# Patient Record
Sex: FEMALE | Race: WHITE | HISPANIC OR LATINO | Employment: OTHER | URBAN - METROPOLITAN AREA
[De-identification: names, ages, dates, MRNs, and addresses within clinical notes are randomized per-mention and may not be internally consistent; named-entity substitution may affect disease eponyms.]

---

## 2018-10-24 PROBLEM — R93.5 ABNORMAL CT OF THE ABDOMEN: Status: ACTIVE | Noted: 2018-10-24

## 2018-10-24 PROBLEM — K31.89 MASS OF STOMACH: Status: ACTIVE | Noted: 2018-10-24

## 2018-10-24 PROBLEM — R19.8 ABNORMAL FINDINGS ON ESOPHAGOGASTRODUODENOSCOPY (EGD): Status: ACTIVE | Noted: 2018-10-24

## 2018-10-24 PROBLEM — R93.3 ABNORMAL COMPUTED TOMOGRAPHY OF STOMACH: Status: ACTIVE | Noted: 2018-10-24

## 2018-10-24 NOTE — PROGRESS NOTES
[facility] Hematolgy/Oncology  History & Physical    Subjective:      Patient ID:   NAME: Lorraine Warner : 1955     63 y.o. female    Referring Doc: Judith  Other Physicians: ariel Velarde (PCP-[place])        Chief Complaint: gastric submucosal nodule      HPI:  63 y.o. female with diagnosis of a submucosal nodule of the gastric cardia who has been referred by Dr Yunior Wong with GI for evaluation by medical hematology/oncology. She had originally presented with abdominal pain that were intermittent over the past 3 months and underwent EGD with Dr Wong on 10/19/2018. She had a CT scan which showed a 2.7 x 1.6cm soft tissue mass in the cardia of the stomach. He found a submucosal nodule in the cardia of the stomach. Pathology from the biopsy seems to have been nondiagnostic. She speaks Zambian as her primary language and her  and my  Teresita assisted with translation. She is from [place]. She denies any weight loss, fevers or night sweats. No family history of cancers.               ROS:   GEN: normal without any fever, night sweats or weight loss  HEENT: normal with no HA's, sore throat, stiff neck, changes in vision  CV: normal with no CP, SOB, PND, MARSHALL or orthopnea  PULM: normal with no SOB, cough, hemoptysis, sputum or pleuritic pain  GI: normal with no abdominal pain, nausea, vomiting, constipation, diarrhea, melanotic stools, BRBPR, or hematemesis  : normal with no hematuria, dysuria  BREAST: normal with no mass, discharge, pain  SKIN: normal with no rash, erythema, bruising, or swelling       Past Medical/Surgical History:  Past Medical History:   Diagnosis Date    Abnormal computed tomography of stomach 10/24/2018    Abnormal CT of the abdomen (soft tissue mass cardia of stomach) 10/24/2018    Abnormal findings on esophagogastroduodenoscopy (EGD) 10/24/2018    Mass of stomach - cardia 10/24/2018     History reviewed. No pertinent surgical history.      Allergies:  Review of  "patient's allergies indicates:  Allergies not on file    Social/Family History:  Social History     Socioeconomic History    Marital status:      Spouse name: Not on file    Number of children: Not on file    Years of education: Not on file    Highest education level: Not on file   Social Needs    Financial resource strain: Not on file    Food insecurity - worry: Not on file    Food insecurity - inability: Not on file    Transportation needs - medical: Not on file    Transportation needs - non-medical: Not on file   Occupational History    Not on file   Tobacco Use    Smoking status: Never Smoker    Smokeless tobacco: Never Used   Substance and Sexual Activity    Alcohol use: No     Frequency: Never    Drug use: No    Sexual activity: Not on file   Other Topics Concern    Not on file   Social History Narrative    Not on file     History reviewed. No pertinent family history.      Medications:    Current Outpatient Medications:     atorvastatin (LIPITOR) 40 MG tablet, Take 40 mg by mouth once daily., Disp: , Rfl:     chlorthalidone (HYGROTEN) 25 MG Tab, Take 25 mg by mouth once daily., Disp: , Rfl:     omeprazole (PRILOSEC) 20 MG capsule, Take 20 mg by mouth once daily., Disp: , Rfl:     potassium chloride (MICRO-K) 10 MEQ CpSR, Take 10 mEq by mouth once., Disp: , Rfl:       Pathology:  Cancer Staging      Gastric cardia biopsy: 10/19/2018:  Gastric oxyntic mucosa with nonspecific mild superficial lamina propria edema and minimal inactive chronic inflammation  - h pylori negative by stains  - no submucosal stromal tissue was identified      Objective:   Vitals:  Blood pressure 133/76, pulse 90, temperature 98.6 °F (37 °C), resp. rate 20, height 5' 0.5" (1.537 m), weight 62.1 kg (137 lb).    Physical Examination:   GEN: no apparent distress, comfortable; AAOx3  HEAD: atraumatic and normocephalic  EYES: no pallor, no icterus, PERRLA  ENT: OMM, no pharyngeal erythema, external ears WNL; no " nasal discharge; no thrush  NECK: no masses, thyroid normal, trachea midline, no LAD/LN's, supple  CV: RRR with no murmur; normal pulse; normal S1 and S2; no pedal edema  CHEST: Normal respiratory effort; CTAB; normal breath sounds; no wheeze or crackles  ABDOM: nontender and nondistended; soft; normal bowel sounds; no rebound/guarding  MUSC/Skeletal: ROM normal; no crepitus; joints normal; no deformities or arthropathy  EXTREM: no clubbing, cyanosis, inflammation or swelling  SKIN: no rashes, lesions, ulcers, petechiae or subcutaneous nodules  : no amaro  NEURO: grossly intact; motor/sensory WNL; AAOx3; no tremors  PSYCH: normal mood, affect and behavior  LYMPH: normal cervical, supraclavicular, axillary and groin LN's      Labs:   10/11/2018 on chart    Radiology/Diagnostic Studies:    Ct abdom/pelvis 10/11/2018 on chart      All lab results and imaging results have been reviewed and discussed with the patient    Assessment:   (1) 63 y.o. female withdiagnosis of a submucosal nodule of the gastric cardia who has been referred by Dr Yunior Wong with GI for evaluation by medical hematology/oncology.   - She had originally presented with abdominal pain and underwent EGD with Dr Wong on 10/19/2018.   - She had a CT scan on 10/11/2018 which showed a 2.7 x 1.6cm soft tissue mass in the cardia of the stomach.   - on EGD, he found a submucosal nodule in the cardia of the stomach.   - Pathology from the biopsy seems to have been nondiagnostic.     (2) HTN and hypercholesterolemia    (3) Hernia    (4) Chronic gastritis    (5) Low potassium    (6) S/p prior bladder drainage issue            Plan:       Abnormal CT of the abdomen (soft tissue mass cardia of stomach)    Abnormal computed tomography of stomach    Mass of stomach - cardia    Abnormal findings on esophagogastroduodenoscopy (EGD)            PLAN:  1. Refer to Dr Garcia/Jay with Gen Surg for surgical evaluation for biopsy and/or resection  2. F/u with PCP  and GI  3. RTC in  3-4 weeks after Gen Surg evaluation  Fax note to Will Velarde MD, Radha Wong        I have explained and the patient understands all of  the current recommendation(s). I have answered all of their questions to the best of my ability and to their complete satisfaction.             Thank you for allowing me to participate in this patient's care. Please call with any questions or concerns.    Electronically signed William Joe MD

## 2018-10-25 ENCOUNTER — OFFICE VISIT (OUTPATIENT)
Dept: HEMATOLOGY/ONCOLOGY | Facility: CLINIC | Age: 63
End: 2018-10-25
Payer: MEDICARE

## 2018-10-25 VITALS
HEIGHT: 61 IN | HEART RATE: 90 BPM | TEMPERATURE: 99 F | SYSTOLIC BLOOD PRESSURE: 133 MMHG | BODY MASS INDEX: 25.86 KG/M2 | DIASTOLIC BLOOD PRESSURE: 76 MMHG | WEIGHT: 137 LBS | RESPIRATION RATE: 20 BRPM

## 2018-10-25 DIAGNOSIS — R93.3 ABNORMAL COMPUTED TOMOGRAPHY OF STOMACH: ICD-10-CM

## 2018-10-25 DIAGNOSIS — K31.89 MASS OF STOMACH: ICD-10-CM

## 2018-10-25 DIAGNOSIS — R93.5 ABNORMAL CT OF THE ABDOMEN: ICD-10-CM

## 2018-10-25 DIAGNOSIS — R19.8 ABNORMAL FINDINGS ON ESOPHAGOGASTRODUODENOSCOPY (EGD): ICD-10-CM

## 2018-10-25 PROCEDURE — 99204 OFFICE O/P NEW MOD 45 MIN: CPT | Mod: ,,, | Performed by: INTERNAL MEDICINE

## 2018-10-25 RX ORDER — POTASSIUM CHLORIDE 750 MG/1
10 CAPSULE, EXTENDED RELEASE ORAL EVERY MORNING
COMMUNITY
End: 2024-02-27

## 2018-10-25 RX ORDER — ATORVASTATIN CALCIUM 40 MG/1
40 TABLET, FILM COATED ORAL NIGHTLY
COMMUNITY

## 2018-10-25 RX ORDER — CHLORTHALIDONE 25 MG/1
12.5 TABLET ORAL EVERY MORNING
COMMUNITY
End: 2021-08-06 | Stop reason: ALTCHOICE

## 2018-10-25 RX ORDER — PHENOL/SODIUM PHENOLATE
20 AEROSOL, SPRAY (ML) MUCOUS MEMBRANE EVERY MORNING
COMMUNITY
End: 2021-08-06 | Stop reason: ALTCHOICE

## 2018-10-25 NOTE — LETTER
October 25, 2018      Will Velarde MD  [address] [facility] [id number] [place] [address] - Hematology Oncology  [address] [place] [address]  [address] [place] [address]  Phone: [phone]  Fax: [phone]          Patient: Lorraine Warner   MR Number: [id number]   YOB: 1955   Date of Visit: 10/25/2018       Dear Dr. Will Velarde:    Thank you for referring Lorraine Warner to me for evaluation. Attached you will find relevant portions of my assessment and plan of care.    If you have questions, please do not hesitate to call me. I look forward to following Lorraine Warner along with you.    Sincerely,    William Joe MD    Enclosure  CC:  No Recipients    If you would like to receive this communication electronically, please contact [email or web address] or [phone] to request more information on [employer] Link access.    For providers and/or their staff who would like to refer a patient to [facility], please contact us through our one-stop-shop provider referral line, [facility], at [phone].    If you feel you have received this communication in error or would no longer like to receive these types of communications, please e-mail [email or web address]

## 2018-10-31 ENCOUNTER — OFFICE VISIT (OUTPATIENT)
Dept: SURGERY | Facility: CLINIC | Age: 63
End: 2018-10-31
Payer: MEDICARE

## 2018-10-31 VITALS
DIASTOLIC BLOOD PRESSURE: 76 MMHG | WEIGHT: 137 LBS | SYSTOLIC BLOOD PRESSURE: 133 MMHG | HEIGHT: 60 IN | BODY MASS INDEX: 26.9 KG/M2

## 2018-10-31 DIAGNOSIS — C49.A2 GASTROINTESTINAL STROMAL TUMOR (GIST) OF STOMACH: Primary | ICD-10-CM

## 2018-10-31 PROCEDURE — 99203 OFFICE O/P NEW LOW 30 MIN: CPT | Mod: ,,, | Performed by: SURGERY

## 2018-10-31 NOTE — PROGRESS NOTES
Subjective:       Patient ID: Lorraine Warner is a 63 y.o. female.    Chief Complaint: Consult (Referred by Dr. Joe to eval submucosal tumor of stomach )      HPI:  Patient is 63 year old female referred to the office after she was found to have an 2.7cm mass extending from the cardia of the stomach posteriorly.  The lesion was found incidentally on CT during workup for vaginal bleeding.  EGD was performed that revealed a submucosal mass about 4-5cm distal to the GE junction.  Path showed only mucosa.  CT shows no evidence of metastatic disease.  She reports some mild left mid abdominal pain.  Pain is dull.  She has no fever or chills.  She has no nausea or vomiting. Communication during the visit was provided by phone translation Kinyarwanda to English, English to Kinyarwanda - [email or web address] with [employer]    Past Medical History:   Diagnosis Date    Abnormal computed tomography of stomach 10/24/2018    Abnormal CT of the abdomen (soft tissue mass cardia of stomach) 10/24/2018    GERD (gastroesophageal reflux disease)     Hyperlipidemia     Hypertension     Mass of stomach - cardia 10/24/2018     Past Surgical History:   Procedure Laterality Date    BLADDER SURGERY       Review of patient's allergies indicates:  No Known Allergies     Medication List           Accurate as of 10/31/18  1:52 PM. If you have any questions, ask your nurse or doctor.               CONTINUE taking these medications    atorvastatin 40 MG tablet  Commonly known as:  LIPITOR     chlorthalidone 25 MG Tab  Commonly known as:  HYGROTEN     omeprazole 20 MG capsule  Commonly known as:  PRILOSEC     potassium chloride 10 MEQ Cpsr  Commonly known as:  MICRO-K          Family History   Problem Relation Age of Onset    No Known Problems Mother     No Known Problems Father      Social History     Socioeconomic History    Marital status:      Spouse name: None    Number of children: None    Years of education:  None    Highest education level: None   Social Needs    Financial resource strain: None    Food insecurity - worry: None    Food insecurity - inability: None    Transportation needs - medical: None    Transportation needs - non-medical: None   Occupational History    None   Tobacco Use    Smoking status: Never Smoker    Smokeless tobacco: Never Used   Substance and Sexual Activity    Alcohol use: No     Frequency: Never    Drug use: No    Sexual activity: None   Other Topics Concern    None   Social History Narrative    None         Review of Systems   Constitutional: Negative for appetite change, chills, fever and unexpected weight change.   HENT: Negative for hearing loss, rhinorrhea, sore throat and voice change.    Eyes: Negative for photophobia and visual disturbance.   Respiratory: Negative for cough, choking and shortness of breath.    Cardiovascular: Negative for chest pain, palpitations and leg swelling.   Gastrointestinal: Negative for abdominal pain, blood in stool, constipation, diarrhea, nausea and vomiting.   Endocrine: Negative for cold intolerance, heat intolerance, polydipsia and polyuria.   Musculoskeletal: Negative for arthralgias, back pain, joint swelling and neck stiffness.   Skin: Negative for color change, pallor and rash.   Neurological: Negative for dizziness, seizures, syncope and headaches.   Hematological: Negative for adenopathy. Does not bruise/bleed easily.   Psychiatric/Behavioral: Negative for agitation, behavioral problems and confusion.       Objective:      Physical Exam   Constitutional: She appears well-developed and well-nourished.  Non-toxic appearance. No distress.   HENT:   Head: Normocephalic and atraumatic. Head is without abrasion and without laceration.   Right Ear: External ear normal.   Left Ear: External ear normal.   Nose: Nose normal.   Mouth/Throat: Oropharynx is clear and moist.   Eyes: EOM are normal. Pupils are equal, round, and reactive to light.    Neck: Trachea normal. Neck supple. No tracheal deviation and normal range of motion present.   Cardiovascular: Normal rate and regular rhythm.   Pulmonary/Chest: Effort normal. No accessory muscle usage. No tachypnea. No respiratory distress.   Abdominal: Soft. Normal appearance and bowel sounds are normal. She exhibits no distension and no mass. There is no hepatosplenomegaly. There is no tenderness. There is no rigidity, no rebound and no guarding. No hernia.   Lymphadenopathy:     She has no cervical adenopathy.        Right: No inguinal adenopathy present.        Left: No inguinal adenopathy present.   Neurological: She is alert. Coordination and gait normal.   Skin: Skin is warm and intact.   Psychiatric: She has a normal mood and affect. Her speech is normal and behavior is normal.       Assessment/Plan:   Lorraine was seen today for consult.    Diagnoses and all orders for this visit:    Gastrointestinal stromal tumor (GIST) of stomach  -     Ambulatory Referral to External Surgery  -     CBC auto differential; Future  -     Comprehensive metabolic panel; Future  -     SCHEDULED EKG 12-LEAD (to Muse); Future  -     X-Ray Chest PA And Lateral; Future  -     CBC auto differential  -     Comprehensive metabolic panel    Will plan on wedge resection of the GIST.  Will attempt to perform procedure with laparoscopic approach.  Due to the posterior and proximal location, risk to convert to open is fairly high.  Communication during the visit was translated by phone translation - [email or web address] [id number]    Planned procedure:  Partial gastrectomy to include GIST tumor    Mefoxin 2 gm IV on call to OR    NPO past midnight    Tay cloth scrub per protocol    SCD's Bilateral Lower Extremities    I discussed the proposed procedures the patient including risks, benefits, indications, alternatives and special concerns.  The patient appears to understand and agrees to go ahead with surgery.  I have made no promises, warranties or  verbal agreements beyond what was discussed above.

## 2018-10-31 NOTE — LETTER
November 1, 2018      William Joe MD  [address]  [address]  [place] [address]           [facility] - General Surgery  [address]  [address]  [place] [address]  Phone: [phone]  Fax: [phone]          Patient: Lorraine Warner   MR Number: [id number]   YOB: 1955   Date of Visit: 10/31/2018       Dear Dr. William Joe:    Thank you for referring Lorraine Warner to me for evaluation. Attached you will find relevant portions of my assessment and plan of care.    If you have questions, please do not hesitate to call me. I look forward to following Lorraine Warner along with you.    Sincerely,    Arnoldo Garcia III, MD    Enclosure  CC:  No Recipients    If you would like to receive this communication electronically, please contact [email or web address] or [phone] to request more information on [employer] Link access.    For providers and/or their staff who would like to refer a patient to [facility], please contact us through our one-stop-shop provider referral line, [facility], at [phone].    If you feel you have received this communication in error or would no longer like to receive these types of communications, please e-mail [email or web address]

## 2018-11-07 ENCOUNTER — TELEPHONE (OUTPATIENT)
Dept: SURGERY | Facility: CLINIC | Age: 63
End: 2018-11-07

## 2018-11-07 NOTE — TELEPHONE ENCOUNTER
Per pt spouse Geraldo, pt would like to proceed with scheduling surgery on Tuesday, 11/27/18. Preop appt scheduled for Tuesday, 11/20/18 @ 11. He will bring pt into our office at 10 to sign consents. Per Geraldo's request, email with all of this information sent to him at [email or web address]. Jeanine in scheduling informed that pt will need . [employer] information sheet will be attached to preop packet that pt will bring downstairs on 11/20/18.

## 2018-11-20 LAB
ALBUMIN SERPL-MCNC: 4.1 G/DL (ref 3.1–4.7)
ALP SERPL-CCNC: 75 IU/L (ref 40–104)
ALT (SGPT): 20 IU/L (ref 3–33)
AST SERPL-CCNC: 24 IU/L (ref 10–40)
BASOPHILS NFR BLD: 0.1 K/UL (ref 0–0.2)
BASOPHILS NFR BLD: 1.1 %
BILIRUB SERPL-MCNC: 0.4 MG/DL (ref 0.3–1)
BUN SERPL-MCNC: 22 MG/DL (ref 8–20)
CALCIUM SERPL-MCNC: 10 MG/DL (ref 7.7–10.4)
CHLORIDE: 102 MMOL/L (ref 98–110)
CO2 SERPL-SCNC: 29.8 MMOL/L (ref 22.8–31.6)
CREATININE: 1.14 MG/DL (ref 0.6–1.4)
EOSINOPHIL NFR BLD: 0.1 K/UL (ref 0–0.7)
EOSINOPHIL NFR BLD: 2.1 %
ERYTHROCYTE [DISTWIDTH] IN BLOOD BY AUTOMATED COUNT: 12.6 % (ref 11.7–14.9)
GLUCOSE: 78 MG/DL (ref 70–99)
GRAN #: 2.5 K/UL (ref 1.4–6.5)
GRAN%: 47.8 %
HCT VFR BLD AUTO: 40.6 % (ref 36–48)
HGB BLD-MCNC: 13.3 G/DL (ref 12–15)
IMMATURE GRANS (ABS): 0 K/UL (ref 0–1)
IMMATURE GRANULOCYTES: 0 %
LYMPH #: 2.2 K/UL (ref 1.2–3.4)
LYMPH%: 40.9 %
MCH RBC QN AUTO: 31 PG (ref 25–35)
MCHC RBC AUTO-ENTMCNC: 32.8 G/DL (ref 31–36)
MCV RBC AUTO: 94.6 FL (ref 79–98)
MONO #: 0.4 K/UL (ref 0.1–0.6)
MONO%: 8.1 %
NUCLEATED RBCS: 0 %
PLATELET # BLD AUTO: 252 K/UL (ref 140–440)
PMV BLD AUTO: 10.7 FL (ref 8.8–12.7)
POTASSIUM SERPL-SCNC: 3.6 MMOL/L (ref 3.5–5)
PROT SERPL-MCNC: 7.1 G/DL (ref 6–8.2)
RBC # BLD AUTO: 4.29 M/UL (ref 3.5–5.5)
SODIUM: 140 MMOL/L (ref 134–144)
WBC # BLD AUTO: 5.3 K/UL (ref 5–10)

## 2018-11-28 LAB
BASOPHILS NFR BLD: 0 K/UL (ref 0–0.2)
BASOPHILS NFR BLD: 0.1 %
BUN SERPL-MCNC: 18 MG/DL (ref 8–20)
CALCIUM SERPL-MCNC: 9.1 MG/DL (ref 7.7–10.4)
CHLORIDE: 100 MMOL/L (ref 98–110)
CO2 SERPL-SCNC: 28.1 MMOL/L (ref 22.8–31.6)
CREATININE: 1.09 MG/DL (ref 0.6–1.4)
EOSINOPHIL NFR BLD: 0 %
EOSINOPHIL NFR BLD: 0 K/UL (ref 0–0.7)
ERYTHROCYTE [DISTWIDTH] IN BLOOD BY AUTOMATED COUNT: 12.6 % (ref 11.7–14.9)
GLUCOSE: 123 MG/DL (ref 70–99)
GRAN #: 9.8 K/UL (ref 1.4–6.5)
GRAN%: 87.7 %
HCT VFR BLD AUTO: 35 % (ref 36–48)
HGB BLD-MCNC: 12 G/DL (ref 12–15)
IMMATURE GRANS (ABS): 0.1 K/UL (ref 0–1)
IMMATURE GRANULOCYTES: 0.7 %
LYMPH #: 0.9 K/UL (ref 1.2–3.4)
LYMPH%: 8 %
MCH RBC QN AUTO: 31.5 PG (ref 25–35)
MCHC RBC AUTO-ENTMCNC: 34.3 G/DL (ref 31–36)
MCV RBC AUTO: 91.9 FL (ref 79–98)
MONO #: 0.4 K/UL (ref 0.1–0.6)
MONO%: 3.5 %
NUCLEATED RBCS: 0 %
PLATELET # BLD AUTO: 222 K/UL (ref 140–440)
PMV BLD AUTO: 11 FL (ref 8.8–12.7)
POTASSIUM SERPL-SCNC: 3.8 MMOL/L (ref 3.5–5)
RBC # BLD AUTO: 3.81 M/UL (ref 3.5–5.5)
SODIUM: 139 MMOL/L (ref 134–144)
WBC # BLD AUTO: 11.1 K/UL (ref 5–10)

## 2018-11-29 LAB
BASOPHILS NFR BLD: 0 K/UL (ref 0–0.2)
BASOPHILS NFR BLD: 0.1 %
BUN SERPL-MCNC: 21 MG/DL (ref 8–20)
CALCIUM SERPL-MCNC: 9.1 MG/DL (ref 7.7–10.4)
CHLORIDE: 106 MMOL/L (ref 98–110)
CO2 SERPL-SCNC: 29.9 MMOL/L (ref 22.8–31.6)
CREATININE: 0.97 MG/DL (ref 0.6–1.4)
EOSINOPHIL NFR BLD: 0 %
EOSINOPHIL NFR BLD: 0 K/UL (ref 0–0.7)
ERYTHROCYTE [DISTWIDTH] IN BLOOD BY AUTOMATED COUNT: 13.1 % (ref 11.7–14.9)
GLUCOSE: 95 MG/DL (ref 70–99)
GRAN #: 7.8 K/UL (ref 1.4–6.5)
GRAN%: 78.4 %
HCT VFR BLD AUTO: 32.7 % (ref 36–48)
HGB BLD-MCNC: 10.9 G/DL (ref 12–15)
IMMATURE GRANS (ABS): 0.1 K/UL (ref 0–1)
IMMATURE GRANULOCYTES: 0.5 %
LYMPH #: 1.5 K/UL (ref 1.2–3.4)
LYMPH%: 15.3 %
MCH RBC QN AUTO: 31.2 PG (ref 25–35)
MCHC RBC AUTO-ENTMCNC: 33.3 G/DL (ref 31–36)
MCV RBC AUTO: 93.7 FL (ref 79–98)
MONO #: 0.6 K/UL (ref 0.1–0.6)
MONO%: 5.7 %
NUCLEATED RBCS: 0 %
PLATELET # BLD AUTO: 200 K/UL (ref 140–440)
PMV BLD AUTO: 11.3 FL (ref 8.8–12.7)
POTASSIUM SERPL-SCNC: 3.6 MMOL/L (ref 3.5–5)
RBC # BLD AUTO: 3.49 M/UL (ref 3.5–5.5)
SODIUM: 141 MMOL/L (ref 134–144)
WBC # BLD AUTO: 10 K/UL (ref 5–10)

## 2018-11-30 LAB
BASOPHILS NFR BLD: 0 K/UL (ref 0–0.2)
BASOPHILS NFR BLD: 0.2 %
BUN SERPL-MCNC: 22 MG/DL (ref 8–20)
CALCIUM SERPL-MCNC: 9.1 MG/DL (ref 7.7–10.4)
CHLORIDE: 102 MMOL/L (ref 98–110)
CO2 SERPL-SCNC: 28.7 MMOL/L (ref 22.8–31.6)
CREATININE: 1.24 MG/DL (ref 0.6–1.4)
EOSINOPHIL NFR BLD: 0.1 K/UL (ref 0–0.7)
EOSINOPHIL NFR BLD: 0.7 %
ERYTHROCYTE [DISTWIDTH] IN BLOOD BY AUTOMATED COUNT: 13.4 % (ref 11.7–14.9)
GLUCOSE: 101 MG/DL (ref 70–99)
GRAN #: 4.4 K/UL (ref 1.4–6.5)
GRAN%: 54.4 %
HCT VFR BLD AUTO: 35 % (ref 36–48)
HGB BLD-MCNC: 11.5 G/DL (ref 12–15)
IMMATURE GRANS (ABS): 0 K/UL (ref 0–1)
IMMATURE GRANULOCYTES: 0.2 %
LYMPH #: 2.9 K/UL (ref 1.2–3.4)
LYMPH%: 36.3 %
MCH RBC QN AUTO: 31.1 PG (ref 25–35)
MCHC RBC AUTO-ENTMCNC: 32.9 G/DL (ref 31–36)
MCV RBC AUTO: 94.6 FL (ref 79–98)
MONO #: 0.7 K/UL (ref 0.1–0.6)
MONO%: 8.2 %
NUCLEATED RBCS: 0 %
PLATELET # BLD AUTO: 210 K/UL (ref 140–440)
PMV BLD AUTO: 11 FL (ref 8.8–12.7)
POTASSIUM SERPL-SCNC: 3.7 MMOL/L (ref 3.5–5)
RBC # BLD AUTO: 3.7 M/UL (ref 3.5–5.5)
SODIUM: 140 MMOL/L (ref 134–144)
WBC # BLD AUTO: 8.1 K/UL (ref 5–10)

## 2018-12-13 ENCOUNTER — OFFICE VISIT (OUTPATIENT)
Dept: SURGERY | Facility: CLINIC | Age: 63
End: 2018-12-13
Payer: MEDICARE

## 2018-12-13 VITALS
DIASTOLIC BLOOD PRESSURE: 76 MMHG | SYSTOLIC BLOOD PRESSURE: 133 MMHG | HEIGHT: 60 IN | BODY MASS INDEX: 26.9 KG/M2 | WEIGHT: 137 LBS

## 2018-12-13 DIAGNOSIS — C49.A2 GASTROINTESTINAL STROMAL TUMOR (GIST) OF STOMACH: Primary | ICD-10-CM

## 2018-12-13 PROCEDURE — 99024 POSTOP FOLLOW-UP VISIT: CPT | Mod: POP,,, | Performed by: SURGERY

## 2018-12-14 NOTE — PROGRESS NOTES
Subjective:       Patient ID: Lorraine Warner is a 63 y.o. female.    Chief Complaint: Post-op Evaluation (FU DOS 11/27/18 L/S Partial gastrectomy )      HPI:  Patient returns to the office after resection of 2.5cm GIST from the posterior stomach.  She is doing well.  She had an uneventful hospital course.  She has no complaints.  Pain is resolved.  Path returned 2.5cm low grade GIST with negative margin.      Review of Systems   Constitutional: Negative for appetite change, chills, fever and unexpected weight change.   HENT: Negative for hearing loss, rhinorrhea, sore throat and voice change.    Eyes: Negative for photophobia and visual disturbance.   Respiratory: Negative for cough, choking and shortness of breath.    Cardiovascular: Negative for chest pain, palpitations and leg swelling.   Gastrointestinal: Negative for abdominal pain, blood in stool, constipation, diarrhea, nausea and vomiting.   Endocrine: Negative for cold intolerance, heat intolerance, polydipsia and polyuria.   Musculoskeletal: Negative for arthralgias, back pain, joint swelling and neck stiffness.   Skin: Negative for color change, pallor and rash.   Neurological: Negative for dizziness, seizures, syncope and headaches.   Hematological: Negative for adenopathy. Does not bruise/bleed easily.   Psychiatric/Behavioral: Negative for agitation, behavioral problems and confusion.       Objective:      Physical Exam   Constitutional: She is oriented to person, place, and time. She is cooperative. No distress.   Pulmonary/Chest: Effort normal. No respiratory distress.   Abdominal: Soft. She exhibits no distension. There is no tenderness. There is no rebound and no guarding.   Neurological: She is alert and oriented to person, place, and time.   Skin:   Incisions are clean, dry and intact  There is no evidence of infection, hematoma or seroma        Assessment/Plan:   Gastrointestinal stromal tumor (GIST) of stomach      Low grade 2.5cm GIST.   Will refer to oncology for further recommendations.

## 2019-01-14 NOTE — PROGRESS NOTES
[facility] Hematology/Oncology  PROGRESS NOTE - 2nd Follow-up Visit      Subjective:       Patient ID:   NAME: Lorraine Warner : 1955     63 y.o. female    Referring Doc: Judith  Other Physicians: Radha Kumar    Chief Complaint:  gastric submucosal nodule/GIST f/u        History of Present Illness:     Patient returns today for a 2nd regularly scheduled follow-up visit.  The patient is here today to go over the results of the recently ordered labs, tests and studies. She is here with her .She saw Dr Garcia with Gen Surgery and had subsequent resection of the mass on 2018. Teresita with our office helped translate. We discussed the potential need for the drug Gleevec to reduce the chances of the tumor coming back. She has healed well since having the procedure.             ROS:   GEN: normal without any fever, night sweats or weight loss  HEENT: normal with no HA's, sore throat, stiff neck, changes in vision  CV: normal with no CP, SOB, PND, MARSHALL or orthopnea  PULM: normal with no SOB, cough, hemoptysis, sputum or pleuritic pain  GI: normal with no abdominal pain, nausea, vomiting, constipation, diarrhea, melanotic stools, BRBPR, or hematemesis  : normal with no hematuria, dysuria  BREAST: normal with no mass, discharge, pain  SKIN: normal with no rash, erythema, bruising, or swelling    Allergies:  Review of patient's allergies indicates:  No Known Allergies    Medications:    Current Outpatient Medications:     atorvastatin (LIPITOR) 40 MG tablet, Take 40 mg by mouth once daily., Disp: , Rfl:     chlorthalidone (HYGROTEN) 25 MG Tab, Take 25 mg by mouth once daily., Disp: , Rfl:     omeprazole (PRILOSEC) 20 MG capsule, Take 20 mg by mouth once daily., Disp: , Rfl:     potassium chloride (MICRO-K) 10 MEQ CpSR, Take 10 mEq by mouth once., Disp: , Rfl:     PMHx/PSHx Updates:  See patient's last visit with me on 10/25/2018.  See H&P on 10/25/2018        Pathology:  Cancer  Staging      Gastric Wedge Resection: 11/27/2018:    FINAL DIAGNOSIS:  POSTERIOR STOMACH, WEDGE RESECTION:   GASTROINTESTINAL STROMAL TUMOR (GIST), MIXED SPINDLE CELL AND  EPITHELIOID TYPE.    2.5 CM IN GREATEST DIMENSIONS.    THE SHAVED SURGICAL MARGIN OF RESECTION IS FREE OF TUMOR.    THE TUMOR FOCALLY EXTENDS TO THE SEROSAL SURFACE.  pT2NX, (Stage IA).  G1: Low Grade with mitotic rate less than or equal to 5/5mm2  KIT ():     Positive.   DOG-1:     Positive    RISK ASSESSMENT:   Very low risk (1.9%), Based on the strict criteria of mitotic count  and size of tumor. A moderate risk cannot be entirely   excluded.    Objective:     Vitals:  Blood pressure 132/76, pulse 84, temperature 98.6 °F (37 °C), resp. rate 20, weight 63 kg (138 lb 12.8 oz).    Physical Examination:   GEN: no apparent distress, comfortable; AAOx3  HEAD: atraumatic and normocephalic  EYES: no pallor, no icterus, PERRLA  ENT: OMM, no pharyngeal erythema, external ears WNL; no nasal discharge; no thrush  NECK: no masses, thyroid normal, trachea midline, no LAD/LN's, supple  CV: RRR with no murmur; normal pulse; normal S1 and S2; no pedal edema  CHEST: Normal respiratory effort; CTAB; normal breath sounds; no wheeze or crackles  ABDOM: nontender and nondistended; soft; normal bowel sounds; no rebound/guarding  MUSC/Skeletal: ROM normal; no crepitus; joints normal; no deformities or arthropathy  EXTREM: no clubbing, cyanosis, inflammation or swelling  SKIN: no rashes, lesions, ulcers, petechiae or subcutaneous nodules  : no amaro  NEURO: grossly intact; motor/sensory WNL; AAOx3; no tremors  PSYCH: normal mood, affect and behavior  LYMPH: normal cervical, supraclavicular, axillary and groin LN's            Labs:     11/30/2018  Lab Results   Component Value Date    WBC 8.1 11/30/2018    HGB 11.5 (L) 11/30/2018    HCT 35.0 (L) 11/30/2018    MCV 94.6 11/30/2018     11/30/2018     CMP  Sodium   Date Value Ref Range Status   11/30/2018 140  134 - 144 mmol/L      Potassium   Date Value Ref Range Status   11/30/2018 3.7 3.5 - 5.0 mmol/L      Chloride   Date Value Ref Range Status   11/30/2018 102 98 - 110 mmol/L      CO2   Date Value Ref Range Status   11/30/2018 28.7 22.8 - 31.6 mmol/L      Glucose   Date Value Ref Range Status   11/30/2018 101 (H) 70 - 99 mg/dL      BUN, Bld   Date Value Ref Range Status   11/30/2018 22 (H) 8 - 20 mg/dL      Creatinine   Date Value Ref Range Status   11/30/2018 1.24 0.60 - 1.40 mg/dL      Calcium   Date Value Ref Range Status   11/30/2018 9.1 7.7 - 10.4 mg/dL      Total Protein   Date Value Ref Range Status   11/20/2018 7.1 6.0 - 8.2 g/dL      Albumin   Date Value Ref Range Status   11/20/2018 4.1 3.1 - 4.7 g/dL      Total Bilirubin   Date Value Ref Range Status   11/20/2018 0.4 0.3 - 1.0 mg/dL      Alkaline Phosphatase   Date Value Ref Range Status   11/20/2018 75 40 - 104 IU/L      AST   Date Value Ref Range Status   11/20/2018 24 10 - 40 IU/L            Radiology/Diagnostic Studies:    No results found.    I have reviewed all available lab results and radiology reports.    Assessment/Plan:   (1) 63 y.o. female with diagnosis of a submucosal nodule of the gastric cardia who has been referred by Dr Yunior Wong with GI for evaluation by medical hematology/oncology.   - She had originally presented with abdominal pain and underwent EGD with Dr Wong on 10/19/2018.   - She had a CT scan on 10/11/2018 which showed a 2.7 x 1.6cm soft tissue mass in the cardia of the stomach.   - on EGD, he found a submucosal nodule in the cardia of the stomach.   - Pathology from the initial biopsy seems to have been nondiagnostic.   - she saw Dr Garcia with Gen Surg and underwent a wedge resection on 11/27/2018 with the pathology ultimately showing GIST  - G1: Low Grade; pT2NX, (Stage IA).  - discussed the potential need and benefit from the oral drug Gleevec (usually for one year)    (2) HTN and hypercholesterolemia     (3)  "Hernia     (4) Chronic gastritis  -    (5) Low potassium     (6) S/p prior bladder drainage issue          VISIT DIAGNOSES:      Abnormal CT of the abdomen (soft tissue mass cardia of stomach)  -     NM PET CT Routine Skull to Mid Thigh  -     CBC auto differential; Future; Expected date: 01/15/2019  -     Comprehensive metabolic panel; Future; Expected date: 01/15/2019    Mass of stomach - cardia    Abnormal computed tomography of stomach    Gastrointestinal stromal tumor (GIST) of stomach  -     NM PET CT Routine Skull to Mid Thigh  -     CBC auto differential; Future; Expected date: 01/15/2019  -     Comprehensive metabolic panel; Future; Expected date: 01/15/2019          PLAN:  1. Schedule PET scan  2. Check the NCCN guidelines on Gleevec with GIST, and see if she would be a candidate for therapy; if so, will set up chemotherapy school with [employer] [facility]  3. F/u with PCP, GI, Gen Surg etc  4. RTC in 1 week   Fax note to Will Velarde MD, Judith; Angeliqueo;     Discussion:     Pathology Discussion:    I reviewed and discussed the pathology report(s) and radiograph reports (if available) in as simple to understand and/or laymen's terms to the best of my ability. I had an indepth conversation with the patient and went over the patient's individual diagnosis based on the information that was currently available. I discussed the TNM staging process with regard to the patient's particular cancer type, and the calculated stage based on the currently available TNM data and literature. I discussed the available prognostic data with regard to the current staging information and how it relates to the prognosis of their particular neoplastic process.          NCCN Guidelines:    I discussed the available treatment option(s) in accordance with the latest literature from the NCCN Clinical Practice Guidelines for the patient's particular type of cancer disorder. The NCCN Guidelines provide a "document evidence-based (and) " "consensus-driven management" of the care of oncology patients. The treatment recommendations were made not only in accordance to the NCCN guidelines, but also factored in to account the patient's overall age, condition, performance status and their medical co-morbidities. I went over the risks and benefits of the the treatment options (if any could be made) with regard to their particular cancer type, their cancer stage, their age, and their co-morbidities.     ADDENDUM:   According to pathologist, her tumor was 2.5cm but was low grade with mitotic rate < or = to 5/50 hpf  - her risk assessment as a result is very low risk at 1.9% risk on metastasis  - as a result, based on the latest NCCN guidelines (v. 1.2019) I would recommend surveillance only and she should not need Gleevec therapy at this time    (the NCCN guidelines are included on the chart)    I spent over 45 mins of time with the patient. Reviewed results of the recently ordered labs, tests and studies; made directives with regards to the results. Over half of this time was spent couseling and coordinating care.    I have explained all of the above in detail and the patient understands all of the current recommendation(s). I have answered all of their questions to the best of my ability and to their complete satisfaction.   The patient is to continue with the current management plan.    Electronically signed by William Joe MD        "

## 2019-01-15 ENCOUNTER — OFFICE VISIT (OUTPATIENT)
Dept: HEMATOLOGY/ONCOLOGY | Facility: CLINIC | Age: 64
End: 2019-01-15
Payer: MEDICARE

## 2019-01-15 VITALS
SYSTOLIC BLOOD PRESSURE: 132 MMHG | HEART RATE: 84 BPM | TEMPERATURE: 99 F | BODY MASS INDEX: 27.11 KG/M2 | RESPIRATION RATE: 20 BRPM | DIASTOLIC BLOOD PRESSURE: 76 MMHG | WEIGHT: 138.81 LBS

## 2019-01-15 DIAGNOSIS — R93.5 ABNORMAL CT OF THE ABDOMEN: Primary | ICD-10-CM

## 2019-01-15 DIAGNOSIS — K31.89 MASS OF STOMACH: ICD-10-CM

## 2019-01-15 DIAGNOSIS — R93.3 ABNORMAL COMPUTED TOMOGRAPHY OF STOMACH: ICD-10-CM

## 2019-01-15 DIAGNOSIS — C49.A2 GASTROINTESTINAL STROMAL TUMOR (GIST) OF STOMACH: ICD-10-CM

## 2019-01-15 PROCEDURE — 99215 PR OFFICE/OUTPT VISIT, EST, LEVL V, 40-54 MIN: ICD-10-PCS | Mod: ,,, | Performed by: INTERNAL MEDICINE

## 2019-01-15 PROCEDURE — 99215 OFFICE O/P EST HI 40 MIN: CPT | Mod: ,,, | Performed by: INTERNAL MEDICINE

## 2019-01-15 NOTE — LETTER
January 15, 2019      Will Velarde MD  [address] [facility] [id number] [place] [address] - Hematology Oncology  [address] [facility]  [address] [place] [id number]  Phone: [phone]  Fax: [phone]          Patient: Lorraine Warner   MR Number: [id number]   YOB: 1955   Date of Visit: 1/15/2019       Dear Dr. Will Velarde:    Thank you for referring Lorraine Warner to me for evaluation. Attached you will find relevant portions of my assessment and plan of care.    If you have questions, please do not hesitate to call me. I look forward to following Lorraine Warner along with you.    Sincerely,    William Joe MD    Enclosure  CC:  No Recipients    If you would like to receive this communication electronically, please contact [email or web address] or [phone] to request more information on [employer] access.    For providers and/or their staff who would like to refer a patient to [facility], please contact us through our one-stop-shop provider referral line, [facility], at [phone].    If you feel you have received this communication in error or would no longer like to receive these types of communications, please e-mail [email or web address]

## 2019-01-15 NOTE — PATIENT INSTRUCTIONS
Gastrointestinal Stromal Tumor (GIST)  What is a GIST?    GIST is a rare cancer of the GI tract. A tumor can happen anywhere in your GI tract, from the esophagus to the anus. GIST starts in cells called the interstitial cells of [place]. These cells are part of the nervous system. They send signals to the muscles of your GI tract. This helps your GI tract move food and liquid through it.  What causes GIST?  Researchers are still learning what causes GIST. In most cases, an abnormal gene causes an enzyme called KIT to tell cells in the gut to keep multiplying. This causes a tumor to grow. In rare cases, a different protein causes cells to grow too much and causes a tumor. GIST may run in families who have neurofibromatosis type 1. This is a genetic condition that causes tumors to grow in parts of the nervous system.  Symptoms of GIST  You may not have any symptoms if you have early stage GIST. In later stages, symptoms may include:  · Discomfort or pain in the stomach  · Vomiting  · Blood in stools or vomit  · Tiredness from low levels of red blood cells (anemia)  · Feeling full after eating only a small amount  · Loss of appetite  · Weight loss  Diagnosing GIST  Your healthcare provider will ask about your medical history and your symptoms. He or she will give you a physical exam. You may have imaging tests, such as:  · Endoscopy. A flexible tube is sent down your throat. The tube has a small light and video camera. This lets your healthcare provider look at the lining of your esophagus, stomach, and upper small intestine.  · X-ray. This test uses a small amount of radiation to create pictures of the inside of your body.  · CT scan. This is a test that uses a series of X-rays and a computer to create images of the inside of the body.  · MRI. This test uses large magnets and a computer to create images of the body.  · Biopsy. Small pieces of tissue are taken from the tumor. They are looked at with a microscope in a  lab. A biopsy may be done during endoscopy. Or it may be done with a thin needle. Or the tissue may be cut out with a small knife during surgery. The sample will be checked for the KIT enzyme or another protein that causes GI cells to grow too much.  Treatment for GIST  GIST can be treated in several ways. GIST grows differently in each person. The type of treatment that is best for you depends on where the tumor is, how big it is, and whether cells from the tumor have spread. This is called metastasis. Cancer cells can sometimes spread to other parts of the body. The most common places for GIST cells to spread are the liver and the lining of the stomach (peritoneal cavity).  Treatment may start with surgery. This is done to remove the tumor.  You may also have targeted therapy. Targeted therapy is the use of medicine that targets the parts of cancer cells that make them unlike normal cells. The medicines include:  · Imatinib. This is mostly used for advanced-stage GIST. It may also help with earlier-stage tumors. It can be given before surgery to try to shrink the tumor, or after surgery to help lower the chance of the cancer coming back. It may not cure advanced GIST, but it can help people live longer and feel better. The medicine is taken by mouth as a pill. Side effects of imatinib are mostly mild to moderate. The most common side effect is mild nausea. Other side effects, usually mild as well, include diarrhea, fluid retention and swelling (often around the eyes), indigestion, muscle cramps, bleeding from the GIST tumor, fatigue, and a skin rash.  · Sunitinib. This medicine is often used when imatinib doesn't work or if the side effects of imatinib are a problem. Sunitinib can often cause tumors to shrink or stop them from growing for a time. It may help people with GIST live longer. It is taken by mouth as a pill. The most common side effects are diarrhea, mouth irritation, and changes in skin and hair color.  More serious side effects can include high blood pressure, increased risk of bleeding, and swelling.  · Regorafenib. This drug is often used if other medicines are no longer working. It can often shrink tumors or slow their growth for a time. It is taken by mouth as a pill. Side effects can include diarrhea, feeling tired, high blood pressure, mouth sores, hair loss, loss of appetite, and problems with redness and pain in the palms of the hands and soles of the feet   Other treatments, such as radiation therapy, are used less often. Your healthcare provider will talk with you about the treatment that can work best for you.     When to call the healthcare provider  Call your healthcare provider if you have side effects from treatment that cause problems with your daily life.   Date Last Reviewed: 2/7/2016  © 3905-6984 [employer]. [address] [place] [address], [place] [address]. All rights reserved. This information is not intended as a substitute for professional medical care. Always follow your healthcare professional's instructions.

## 2019-01-17 LAB
ALBUMIN SERPL-MCNC: 4.2 G/DL (ref 3.1–4.7)
ALP SERPL-CCNC: 80 IU/L (ref 40–104)
ALT (SGPT): 21 IU/L (ref 3–33)
AST SERPL-CCNC: 24 IU/L (ref 10–40)
BASOPHILS NFR BLD: 0 K/UL (ref 0–0.2)
BASOPHILS NFR BLD: 0.6 %
BILIRUB SERPL-MCNC: 0.7 MG/DL (ref 0.3–1)
BUN SERPL-MCNC: 22 MG/DL (ref 8–20)
CALCIUM SERPL-MCNC: 9.8 MG/DL (ref 7.7–10.4)
CHLORIDE: 98 MMOL/L (ref 98–110)
CO2 SERPL-SCNC: 30.5 MMOL/L (ref 22.8–31.6)
CREATININE: 1.08 MG/DL (ref 0.6–1.4)
EOSINOPHIL NFR BLD: 0.1 K/UL (ref 0–0.7)
EOSINOPHIL NFR BLD: 1.6 %
ERYTHROCYTE [DISTWIDTH] IN BLOOD BY AUTOMATED COUNT: 12.4 % (ref 12.5–14.5)
GLUCOSE: 114 MG/DL (ref 70–99)
GRAN #: 3.7 K/UL (ref 1.4–6.5)
GRAN%: 59.3 %
HCT VFR BLD AUTO: 42.4 % (ref 36–48)
HGB BLD-MCNC: 13.7 G/DL (ref 12–15)
IMMATURE GRANS (ABS): 0 K/UL (ref 0–1)
IMMATURE GRANULOCYTES: 0.3 %
LYMPH #: 1.9 K/UL (ref 1.2–3.4)
LYMPH%: 30.9 %
MCH RBC QN AUTO: 31 PG (ref 25–35)
MCHC RBC AUTO-ENTMCNC: 32.3 G/DL (ref 31–36)
MCV RBC AUTO: 95.9 FL (ref 79–98)
MONO #: 0.5 K/UL (ref 0.1–0.6)
MONO%: 7.3 %
NUCLEATED RBCS: 0 %
NUCLEATED RED BLOOD CELLS: 0 /100 WBC
PERFORMED BY:: ABNORMAL
PLATELET # BLD AUTO: 247 K/UL (ref 140–440)
PMV BLD AUTO: 10.2 FL (ref 8.8–12.7)
POTASSIUM SERPL-SCNC: 3.9 MMOL/L (ref 3.5–5)
PROT SERPL-MCNC: 7.5 G/DL (ref 6–8.2)
RBC # BLD AUTO: 4.42 M/UL (ref 3.5–5.5)
SODIUM: 138 MMOL/L (ref 134–144)
WBC # BLD: 6.3 K/UL (ref 5–10)

## 2019-01-23 ENCOUNTER — TELEPHONE (OUTPATIENT)
Dept: HEMATOLOGY/ONCOLOGY | Facility: CLINIC | Age: 64
End: 2019-01-23

## 2019-01-23 NOTE — TELEPHONE ENCOUNTER
Called to see if the pt had any labs done prior to f/u appt.  Pt has her Pet scan scheduled for 1/29.    Pt needs a f/u for after this time.

## 2019-01-30 NOTE — PROGRESS NOTES
[facility] Hematology/Oncology  PROGRESS NOTE -  Follow-up Visit      Subjective:       Patient ID:   NAME: Lorraine Warner : 1955     63 y.o. female    Referring Doc: Judith  Other Physicians: Radha Kumar    Chief Complaint:  gastric submucosal nodule/GIST f/u        History of Present Illness:     Patient returns today for a 3rd regularly scheduled follow-up visit.  The patient is here today to go over the results of the recently ordered labs, tests and studies. She is here with her . She previously saw Dr Garcia with Gen Surgery and had subsequent resection of the mass on 2018. Teresita with our office helped translate. She is doing ok with no new issues. She has healed well from the surgery. She denies any Cp< SOB, HA's or N/V.             ROS:   GEN: normal without any fever, night sweats or weight loss  HEENT: normal with no HA's, sore throat, stiff neck, changes in vision  CV: normal with no CP, SOB, PND, MARSHALL or orthopnea  PULM: normal with no SOB, cough, hemoptysis, sputum or pleuritic pain  GI: normal with no abdominal pain, nausea, vomiting, constipation, diarrhea, melanotic stools, BRBPR, or hematemesis  : normal with no hematuria, dysuria  BREAST: normal with no mass, discharge, pain  SKIN: normal with no rash, erythema, bruising, or swelling    Allergies:  Review of patient's allergies indicates:  No Known Allergies    Medications:    Current Outpatient Medications:     atorvastatin (LIPITOR) 40 MG tablet, Take 40 mg by mouth once daily., Disp: , Rfl:     chlorthalidone (HYGROTEN) 25 MG Tab, Take 25 mg by mouth once daily., Disp: , Rfl:     omeprazole (PRILOSEC) 20 MG capsule, Take 20 mg by mouth once daily., Disp: , Rfl:     potassium chloride (MICRO-K) 10 MEQ CpSR, Take 10 mEq by mouth once., Disp: , Rfl:     PMHx/PSHx Updates:  See patient's last visit with me on 1/15/2019  See H&P on 10/25/2018        Pathology:  Cancer Staging      Gastric Wedge Resection:  11/27/2018:    FINAL DIAGNOSIS:  POSTERIOR STOMACH, WEDGE RESECTION:   GASTROINTESTINAL STROMAL TUMOR (GIST), MIXED SPINDLE CELL AND  EPITHELIOID TYPE.    2.5 CM IN GREATEST DIMENSIONS.    THE SHAVED SURGICAL MARGIN OF RESECTION IS FREE OF TUMOR.    THE TUMOR FOCALLY EXTENDS TO THE SEROSAL SURFACE.  pT2NX, (Stage IA).  G1: Low Grade with mitotic rate less than or equal to 5/5mm2  KIT ():     Positive.   DOG-1:     Positive    RISK ASSESSMENT:   Very low risk (1.9%), Based on the strict criteria of mitotic count  and size of tumor. A moderate risk cannot be entirely   excluded.    Objective:     Vitals:  Blood pressure 135/65, pulse 67, temperature 98.1 °F (36.7 °C), resp. rate 20, weight 62.4 kg (137 lb 8 oz).    Physical Examination:   GEN: no apparent distress, comfortable; AAOx3  HEAD: atraumatic and normocephalic  EYES: no pallor, no icterus, PERRLA  ENT: OMM, no pharyngeal erythema, external ears WNL; no nasal discharge; no thrush  NECK: no masses, thyroid normal, trachea midline, no LAD/LN's, supple  CV: RRR with no murmur; normal pulse; normal S1 and S2; no pedal edema  CHEST: Normal respiratory effort; CTAB; normal breath sounds; no wheeze or crackles  ABDOM: nontender and nondistended; soft; normal bowel sounds; no rebound/guarding  MUSC/Skeletal: ROM normal; no crepitus; joints normal; no deformities or arthropathy  EXTREM: no clubbing, cyanosis, inflammation or swelling  SKIN: no rashes, lesions, ulcers, petechiae or subcutaneous nodules  : no amaro  NEURO: grossly intact; motor/sensory WNL; AAOx3; no tremors  PSYCH: normal mood, affect and behavior  LYMPH: normal cervical, supraclavicular, axillary and groin LN's            Labs:     1/17/2019  Lab Results   Component Value Date    WBC 6.3 01/17/2019    HGB 13.7 01/17/2019    HCT 42.4 01/17/2019    MCV 94.6 11/30/2018     01/17/2019     CMP  Sodium   Date Value Ref Range Status   01/17/2019 138 134 - 144 mmol/L      Potassium   Date Value  Ref Range Status   01/17/2019 3.9 3.5 - 5.0 mmol/L      Chloride   Date Value Ref Range Status   01/17/2019 98 98 - 110 mmol/L      CO2   Date Value Ref Range Status   01/17/2019 30.5 22.8 - 31.6 mmol/L      Glucose   Date Value Ref Range Status   01/17/2019 114 (H) 70 - 99 mg/dL      BUN, Bld   Date Value Ref Range Status   01/17/2019 22 (H) 8 - 20 mg/dL      Creatinine   Date Value Ref Range Status   01/17/2019 1.08 0.60 - 1.40 mg/dL      Calcium   Date Value Ref Range Status   01/17/2019 9.8 7.7 - 10.4 mg/dL      Total Protein   Date Value Ref Range Status   01/17/2019 7.5 6.0 - 8.2 g/dL      Albumin   Date Value Ref Range Status   01/17/2019 4.2 3.1 - 4.7 g/dL      Total Bilirubin   Date Value Ref Range Status   01/17/2019 0.7 0.3 - 1.0 mg/dL      Alkaline Phosphatase   Date Value Ref Range Status   01/17/2019 80 40 - 104 IU/L      AST   Date Value Ref Range Status   01/17/2019 24 10 - 40 IU/L            Radiology/Diagnostic Studies:        PET 1/15/2019:  IMPRESSION:    1. No current convincing evidence for residual malignancy or metastatic disease.    2. Incidental findings tiny noncalcified left lung nodules and sclerotic focus  in right scapula. Benign etiologies are favored, although metastatic disease is  conceivable but felt less likely. CT thorax without IV contrast follow-up is  recommended in 3 months to begin to document prolonged stability if imaging  follow-up suggested for at least 2 years. If old outside CTs of the chest are  available to document prolonged stability and these are made available,  addendum can be issued at that time.    3. Focus of FDG uptake along deep aspect of umbilicus without CT correlate is  likely inflammatory in nature and related to recent surgery.        I have reviewed all available lab results and radiology reports.    Assessment/Plan:   (1) 63 y.o. female with diagnosis of a submucosal nodule of the gastric cardia who has been referred by Dr Yunior Wong with GI for  evaluation by medical hematology/oncology.   - She had originally presented with abdominal pain and underwent EGD with Dr Wong on 10/19/2018.   - She had a CT scan on 10/11/2018 which showed a 2.7 x 1.6cm soft tissue mass in the cardia of the stomach.   - on EGD, he found a submucosal nodule in the cardia of the stomach.   - Pathology from the initial biopsy seems to have been nondiagnostic.   - she saw Dr Garcia with Gen Surg and underwent a wedge resection on 11/27/2018 with the pathology ultimately showing GIST  - G1: Low Grade; pT2NX, (Stage IA).  - upon review of the latest guidelines, she should not need any adjuvant therapy with the drug Gleevec due to the low-grade nature of her GIST    (2) HTN and hypercholesterolemia     (3) Hernia     (4) Chronic gastritis  -    (5) Low potassium     (6) S/p prior bladder drainage issue          VISIT DIAGNOSES:      Gastrointestinal stromal tumor (GIST) of stomach    Abnormal CT of the abdomen (soft tissue mass cardia of stomach)    Abnormal computed tomography of stomach          PLAN:  1. Schedule PET scan in 6 months and repeat chest CT in 3 months  2. No indication for Gleevec at this time  3. F/u with PCP, GI, Gen Surg etc  4. RTC in  6 months   Fax note to Will Velarde MD, Judith; Radha;     Discussion:     Pathology Discussion:    I reviewed and discussed the pathology report(s) and radiograph reports (if available) in as simple to understand and/or laymen's terms to the best of my ability. I had an indepth conversation with the patient and went over the patient's individual diagnosis based on the information that was currently available. I discussed the TNM staging process with regard to the patient's particular cancer type, and the calculated stage based on the currently available TNM data and literature. I discussed the available prognostic data with regard to the current staging information and how it relates to the prognosis of their particular  "neoplastic process.          NCCN Guidelines:    I discussed the available treatment option(s) in accordance with the latest literature from the NCCN Clinical Practice Guidelines for the patient's particular type of cancer disorder. The NCCN Guidelines provide a "document evidence-based (and) consensus-driven management" of the care of oncology patients. The treatment recommendations were made not only in accordance to the NCCN guidelines, but also factored in to account the patient's overall age, condition, performance status and their medical co-morbidities. I went over the risks and benefits of the the treatment options (if any could be made) with regard to their particular cancer type, their cancer stage, their age, and their co-morbidities.     ADDENDUM:   According to pathologist, her tumor was 2.5cm but was low grade with mitotic rate < or = to 5/50 hpf  - her risk assessment as a result is very low risk at 1.9% risk on metastasis  - as a result, based on the latest NCCN guidelines (v. 1.2019) I would recommend surveillance only and she should not need Gleevec therapy at this time    (the NCCN guidelines are included on the chart)    I spent over 45 mins of time with the patient. Reviewed results of the recently ordered labs, tests and studies; made directives with regards to the results. Over half of this time was spent couseling and coordinating care.    I have explained all of the above in detail and the patient understands all of the current recommendation(s). I have answered all of their questions to the best of my ability and to their complete satisfaction.   The patient is to continue with the current management plan.    Electronically signed by William Joe MD        "

## 2019-01-31 ENCOUNTER — OFFICE VISIT (OUTPATIENT)
Dept: HEMATOLOGY/ONCOLOGY | Facility: CLINIC | Age: 64
End: 2019-01-31
Payer: MEDICARE

## 2019-01-31 VITALS
HEART RATE: 67 BPM | BODY MASS INDEX: 26.85 KG/M2 | WEIGHT: 137.5 LBS | DIASTOLIC BLOOD PRESSURE: 65 MMHG | SYSTOLIC BLOOD PRESSURE: 135 MMHG | TEMPERATURE: 98 F | RESPIRATION RATE: 20 BRPM

## 2019-01-31 DIAGNOSIS — R93.3 ABNORMAL COMPUTED TOMOGRAPHY OF STOMACH: ICD-10-CM

## 2019-01-31 DIAGNOSIS — R93.5 ABNORMAL CT OF THE ABDOMEN: ICD-10-CM

## 2019-01-31 DIAGNOSIS — C49.A2 GASTROINTESTINAL STROMAL TUMOR (GIST) OF STOMACH: Primary | ICD-10-CM

## 2019-01-31 PROCEDURE — 99214 OFFICE O/P EST MOD 30 MIN: CPT | Mod: ,,, | Performed by: INTERNAL MEDICINE

## 2019-01-31 PROCEDURE — 99214 PR OFFICE/OUTPT VISIT, EST, LEVL IV, 30-39 MIN: ICD-10-PCS | Mod: ,,, | Performed by: INTERNAL MEDICINE

## 2019-01-31 NOTE — LETTER
January 31, 2019      Will Velarde MD  [facility] [id number] [place] [address] - Hematology Oncology  [address]  [address] [place] [id number]  Phone: [phone]  Fax: [phone]          Patient: Lorraine Warner   MR Number: [id number]   YOB: 1955   Date of Visit: 1/31/2019       Dear Dr. Will Velarde:    Thank you for referring Lorraine Warner to me for evaluation. Attached you will find relevant portions of my assessment and plan of care.    If you have questions, please do not hesitate to call me. I look forward to following Lorraine Warner along with you.    Sincerely,    William Joe MD    Enclosure  CC:  No Recipients    If you would like to receive this communication electronically, please contact [email or web address] or [phone] to request more information on [employer] Link access.    For providers and/or their staff who would like to refer a patient to [facility], please contact us through our one-stop-shop provider referral line, [facility], at [phone].    If you feel you have received this communication in error or would no longer like to receive these types of communications, please e-mail [email or web address]

## 2019-04-24 LAB — ISTAT CREATININE: 1.1 MG/DL (ref 0.6–1.4)

## 2019-04-25 ENCOUNTER — TELEPHONE (OUTPATIENT)
Dept: HEMATOLOGY/ONCOLOGY | Facility: CLINIC | Age: 64
End: 2019-04-25

## 2019-04-25 NOTE — TELEPHONE ENCOUNTER
Patient coming may 6th can get results at that time  if she calls will give results     ----- Message from William Joe MD sent at 4/25/2019  8:14 AM CDT -----  Call her with the stable report

## 2019-07-20 DIAGNOSIS — D49.89 NEOPLASM OF ABDOMEN: ICD-10-CM

## 2019-07-29 ENCOUNTER — HOSPITAL ENCOUNTER (OUTPATIENT)
Dept: RADIOLOGY | Facility: HOSPITAL | Age: 64
Discharge: HOME | End: 2019-07-29
Attending: INTERNAL MEDICINE
Payer: MEDICARE

## 2019-07-29 DIAGNOSIS — D49.89 NEOPLASM OF ABDOMEN: ICD-10-CM

## 2019-07-29 LAB — GLUCOSE SERPL-MCNC: 97 MG/DL (ref 70–110)

## 2019-07-29 PROCEDURE — 78815 PET IMAGE W/CT SKULL-THIGH: CPT | Mod: TC,PO

## 2019-07-29 PROCEDURE — A9552 F18 FDG: HCPCS

## 2019-07-30 ENCOUNTER — TELEPHONE (OUTPATIENT)
Dept: HEMATOLOGY/ONCOLOGY | Facility: CLINIC | Age: 64
End: 2019-07-30

## 2019-07-30 NOTE — TELEPHONE ENCOUNTER
Josseline is going to call patient to set up RTC   Patient cancelled last appointment so she can come in to get PET results     ----- Message from William Joe MD sent at 7/30/2019  8:29 AM CDT -----  Call her with good report

## 2019-08-21 ENCOUNTER — OFFICE VISIT (OUTPATIENT)
Dept: HEMATOLOGY/ONCOLOGY | Facility: CLINIC | Age: 64
End: 2019-08-21
Payer: MEDICARE

## 2019-08-21 VITALS
WEIGHT: 140.81 LBS | BODY MASS INDEX: 27.5 KG/M2 | RESPIRATION RATE: 20 BRPM | TEMPERATURE: 98 F | DIASTOLIC BLOOD PRESSURE: 69 MMHG | SYSTOLIC BLOOD PRESSURE: 110 MMHG | HEART RATE: 80 BPM

## 2019-08-21 DIAGNOSIS — R93.5 ABNORMAL CT OF THE ABDOMEN: ICD-10-CM

## 2019-08-21 DIAGNOSIS — R19.8 ABNORMAL FINDINGS ON ESOPHAGOGASTRODUODENOSCOPY (EGD): ICD-10-CM

## 2019-08-21 DIAGNOSIS — R93.3 ABNORMAL COMPUTED TOMOGRAPHY OF STOMACH: ICD-10-CM

## 2019-08-21 DIAGNOSIS — C49.A2 GASTROINTESTINAL STROMAL TUMOR (GIST) OF STOMACH: Primary | ICD-10-CM

## 2019-08-21 PROCEDURE — 99214 PR OFFICE/OUTPT VISIT, EST, LEVL IV, 30-39 MIN: ICD-10-PCS | Mod: S$GLB,,, | Performed by: INTERNAL MEDICINE

## 2019-08-21 PROCEDURE — 99214 OFFICE O/P EST MOD 30 MIN: CPT | Mod: S$GLB,,, | Performed by: INTERNAL MEDICINE

## 2019-08-21 RX ORDER — ROPINIROLE 0.25 MG/1
0.25 TABLET, FILM COATED ORAL 3 TIMES DAILY
COMMUNITY
End: 2019-08-26

## 2019-08-21 NOTE — PROGRESS NOTES
[facility] Hematology/Oncology  PROGRESS NOTE -  Follow-up Visit      Subjective:       Patient ID:   NAME: Lorraine Warner : 1955     64 y.o. female    Referring Doc: Judith  Other Physicians: Radha Kumar    Chief Complaint:  gastric submucosal nodule/GIST f/u        History of Present Illness:     Patient returns today for a  regularly scheduled follow-up visit.  The patient is here today to go over the results of the recently ordered labs, tests and studies. She is here with her . She previously saw Dr Garcia with Gen Surgery and had subsequent resection of the mass on 2018. Teresita with our office helped translate. She is doing ok with no new issues. She had follow-up PEt on 2019; She denies any CP, SOB, HA's or N/V.       She has not seen Dr Wong as of yet for follow-up endoscopy.           ROS:   GEN: normal without any fever, night sweats or weight loss  HEENT: normal with no HA's, sore throat, stiff neck, changes in vision  CV: normal with no CP, SOB, PND, MARSHALL or orthopnea  PULM: normal with no SOB, cough, hemoptysis, sputum or pleuritic pain  GI: normal with no abdominal pain, nausea, vomiting, constipation, diarrhea, melanotic stools, BRBPR, or hematemesis  : normal with no hematuria, dysuria  BREAST: normal with no mass, discharge, pain  SKIN: normal with no rash, erythema, bruising, or swelling    Allergies:  Review of patient's allergies indicates:  No Known Allergies    Medications:    Current Outpatient Medications:     atorvastatin (LIPITOR) 40 MG tablet, Take 40 mg by mouth once daily., Disp: , Rfl:     chlorthalidone (HYGROTEN) 25 MG Tab, Take 25 mg by mouth once daily., Disp: , Rfl:     omeprazole (PRILOSEC) 20 MG capsule, Take 20 mg by mouth once daily., Disp: , Rfl:     potassium chloride (MICRO-K) 10 MEQ CpSR, Take 10 mEq by mouth once., Disp: , Rfl:     ranitidine (ZANTAC) 150 MG capsule, Take 150 mg by mouth 2 (two) times daily., Disp: , Rfl:      rOPINIRole (REQUIP) 0.25 MG tablet, Take 0.25 mg by mouth 3 (three) times daily., Disp: , Rfl:     PMHx/PSHx Updates:  See patient's last visit with me on 1/15/2019  See H&P on 10/25/2018        Pathology:  Cancer Staging      Gastric Wedge Resection: 11/27/2018:    FINAL DIAGNOSIS:  POSTERIOR STOMACH, WEDGE RESECTION:   GASTROINTESTINAL STROMAL TUMOR (GIST), MIXED SPINDLE CELL AND  EPITHELIOID TYPE.    2.5 CM IN GREATEST DIMENSIONS.    THE SHAVED SURGICAL MARGIN OF RESECTION IS FREE OF TUMOR.    THE TUMOR FOCALLY EXTENDS TO THE SEROSAL SURFACE.  pT2NX, (Stage IA).  G1: Low Grade with mitotic rate less than or equal to 5/5mm2  KIT ():     Positive.   DOG-1:     Positive    RISK ASSESSMENT:   Very low risk (1.9%), Based on the strict criteria of mitotic count  and size of tumor. A moderate risk cannot be entirely   excluded.    Objective:     Vitals:  Blood pressure 110/69, pulse 80, temperature 98.1 °F (36.7 °C), temperature source Oral, resp. rate 20, weight 63.9 kg (140 lb 12.8 oz).    Physical Examination:   GEN: no apparent distress, comfortable; AAOx3  HEAD: atraumatic and normocephalic  EYES: no pallor, no icterus, PERRLA  ENT: OMM, no pharyngeal erythema, external ears WNL; no nasal discharge; no thrush  NECK: no masses, thyroid normal, trachea midline, no LAD/LN's, supple  CV: RRR with no murmur; normal pulse; normal S1 and S2; no pedal edema  CHEST: Normal respiratory effort; CTAB; normal breath sounds; no wheeze or crackles  ABDOM: nontender and nondistended; soft; normal bowel sounds; no rebound/guarding  MUSC/Skeletal: ROM normal; no crepitus; joints normal; no deformities or arthropathy  EXTREM: no clubbing, cyanosis, inflammation or swelling  SKIN: no rashes, lesions, ulcers, petechiae or subcutaneous nodules  : no amaro  NEURO: grossly intact; motor/sensory WNL; AAOx3; no tremors  PSYCH: normal mood, affect and behavior  LYMPH: normal cervical, supraclavicular, axillary and groin  LN's            Labs:     1/17/2019  Lab Results   Component Value Date    WBC 6.3 01/17/2019    HGB 13.7 01/17/2019    HCT 42.4 01/17/2019    MCV 94.6 11/30/2018     01/17/2019     CMP  Sodium   Date Value Ref Range Status   01/17/2019 138 134 - 144 mmol/L      Potassium   Date Value Ref Range Status   01/17/2019 3.9 3.5 - 5.0 mmol/L      Chloride   Date Value Ref Range Status   01/17/2019 98 98 - 110 mmol/L      CO2   Date Value Ref Range Status   01/17/2019 30.5 22.8 - 31.6 mmol/L      Glucose   Date Value Ref Range Status   01/17/2019 114 (H) 70 - 99 mg/dL      BUN, Bld   Date Value Ref Range Status   01/17/2019 22 (H) 8 - 20 mg/dL      Creatinine   Date Value Ref Range Status   01/17/2019 1.08 0.60 - 1.40 mg/dL      Calcium   Date Value Ref Range Status   01/17/2019 9.8 7.7 - 10.4 mg/dL      Total Protein   Date Value Ref Range Status   01/17/2019 7.5 6.0 - 8.2 g/dL      Albumin   Date Value Ref Range Status   01/17/2019 4.2 3.1 - 4.7 g/dL      Total Bilirubin   Date Value Ref Range Status   01/17/2019 0.7 0.3 - 1.0 mg/dL      Alkaline Phosphatase   Date Value Ref Range Status   01/17/2019 80 40 - 104 IU/L      AST   Date Value Ref Range Status   01/17/2019 24 10 - 40 IU/L            Radiology/Diagnostic Studies:        PET  7/29/2019:  Impression       No FDG PET/CT findings to suggest recurrent or metastatic disease.             Chest CT 4/24/2019    IMPRESSION:  1. No acute abnormality seen.  2. The 2 lung nodules seen on CT PET scan dated 01/29/2019 represent calcified  granulomas.  3. There are several small perifissural lymph nodes in the right lung measuring  5 mm or less in diameter.  4. There  is no evidence of lung metastasis.  5. No change in the sclerotic focus in the right scapula described on the  previous PET scan and increases the probability of it representing a benign  lesion.        PET 1/15/2019:  IMPRESSION:    1. No current convincing evidence for residual malignancy or metastatic  "disease.    2. Incidental findings tiny noncalcified left lung nodules and sclerotic focus  in right scapula. Benign etiologies are favored, although metastatic disease is  conceivable but felt less likely. CT thorax without IV contrast follow-up is  recommended in 3 months to begin to document prolonged stability if imaging  follow-up suggested for at least 2 years. If old outside CTs of the chest are  available to document prolonged stability and these are made available,  addendum can be issued at that time.    3. Focus of FDG uptake along deep aspect of umbilicus without CT correlate is  likely inflammatory in nature and related to recent surgery.        I have reviewed all available lab results and radiology reports.    Assessment/Plan:   (1) 64 y.o. female with diagnosis of a submucosal nodule of the gastric cardia who has been referred by Dr Yunior Wong with GI for evaluation by medical hematology/oncology.   - She had originally presented with abdominal pain and underwent EGD with Dr Wong on 10/19/2018.   - She had a CT scan on 10/11/2018 which showed a 2.7 x 1.6cm soft tissue mass in the cardia of the stomach.   - on EGD, he found a submucosal nodule in the cardia of the stomach.   - Pathology from the initial biopsy seems to have been nondiagnostic.   - she saw Dr Garcia with Gen Surg and underwent a wedge resection on 11/27/2018 with the pathology ultimately showing GIST  - G1: Low Grade; pT2NX, (Stage IA).  - upon review of the latest guidelines, she should not need any adjuvant therapy with the drug Gleevec due to the low-grade nature of her GIST  - "According to pathologist, her tumor was 2.5cm but was low grade with mitotic rate < or = to 5/50 hpf  - her risk assessment as a result is very low risk at 1.9% risk on metastasis"   - as a result, based on the latest NCCN guidelines (v. 1.2019) I would recommend surveillance only and she should not need Gleevec therapy at this time    - she had PEt on " "7/29/2019    (2) HTN and hypercholesterolemia     (3) Hernia     (4) Chronic gastritis  -    (5) Low potassium     (6) S/p prior bladder drainage issue          VISIT DIAGNOSES:      Gastrointestinal stromal tumor (GIST) of stomach    Abnormal computed tomography of stomach    Abnormal CT of the abdomen (soft tissue mass cardia of stomach)    Abnormal findings on esophagogastroduodenoscopy (EGD)          PLAN:  1. She needs to f/u with GI   2. No indication for Gleevec at this time  3. F/u with PCP, GI, Gen Surg etc  4. Repeat PEt in 6 months  5. RTC in  6 months   Fax note to Judith Velarde; Radha;     Discussion:     Pathology Discussion:    I reviewed and discussed the pathology report(s) and radiograph reports (if available) in as simple to understand and/or laymen's terms to the best of my ability. I had an indepth conversation with the patient and went over the patient's individual diagnosis based on the information that was currently available. I discussed the TNM staging process with regard to the patient's particular cancer type, and the calculated stage based on the currently available TNM data and literature. I discussed the available prognostic data with regard to the current staging information and how it relates to the prognosis of their particular neoplastic process.          NCCN Guidelines:    I discussed the available treatment option(s) in accordance with the latest literature from the NCCN Clinical Practice Guidelines for the patient's particular type of cancer disorder. The NCCN Guidelines provide a "document evidence-based (and) consensus-driven management" of the care of oncology patients. The treatment recommendations were made not only in accordance to the NCCN guidelines, but also factored in to account the patient's overall age, condition, performance status and their medical co-morbidities. I went over the risks and benefits of the the treatment options (if any could be made) with " regard to their particular cancer type, their cancer stage, their age, and their co-morbidities.         (the NCCN guidelines are included on the chart)    I spent over 45 mins of time with the patient. Reviewed results of the recently ordered labs, tests and studies; made directives with regards to the results. Over half of this time was spent couseling and coordinating care.    I have explained all of the above in detail and the patient understands all of the current recommendation(s). I have answered all of their questions to the best of my ability and to their complete satisfaction.   The patient is to continue with the current management plan.    Electronically signed by William Joe MD

## 2019-08-22 ENCOUNTER — LAB VISIT (OUTPATIENT)
Dept: LAB | Facility: HOSPITAL | Age: 64
End: 2019-08-22
Attending: INTERNAL MEDICINE
Payer: MEDICARE

## 2019-08-22 DIAGNOSIS — C49.A2 GASTROINTESTINAL STROMAL TUMOR (GIST) OF STOMACH: ICD-10-CM

## 2019-08-22 LAB
ALBUMIN SERPL BCP-MCNC: 3.9 G/DL (ref 3.5–5.2)
ALP SERPL-CCNC: 74 U/L (ref 55–135)
ALT SERPL W/O P-5'-P-CCNC: 17 U/L (ref 10–44)
ANION GAP SERPL CALC-SCNC: 7 MMOL/L (ref 8–16)
AST SERPL-CCNC: 19 U/L (ref 10–40)
BASOPHILS # BLD AUTO: 0.05 K/UL (ref 0–0.2)
BASOPHILS NFR BLD: 0.9 % (ref 0–1.9)
BILIRUB SERPL-MCNC: 0.9 MG/DL (ref 0.1–1)
BUN SERPL-MCNC: 23 MG/DL (ref 8–23)
CALCIUM SERPL-MCNC: 9.4 MG/DL (ref 8.7–10.5)
CHLORIDE SERPL-SCNC: 105 MMOL/L (ref 95–110)
CO2 SERPL-SCNC: 28 MMOL/L (ref 23–29)
CREAT SERPL-MCNC: 1 MG/DL (ref 0.5–1.4)
DIFFERENTIAL METHOD: ABNORMAL
EOSINOPHIL # BLD AUTO: 0.2 K/UL (ref 0–0.5)
EOSINOPHIL NFR BLD: 2.7 % (ref 0–8)
ERYTHROCYTE [DISTWIDTH] IN BLOOD BY AUTOMATED COUNT: 12.5 % (ref 11.5–14.5)
EST. GFR  (AFRICAN AMERICAN): >60 ML/MIN/1.73 M^2
EST. GFR  (NON AFRICAN AMERICAN): 58.8 ML/MIN/1.73 M^2
GLUCOSE SERPL-MCNC: 91 MG/DL (ref 70–110)
HCT VFR BLD AUTO: 39.3 % (ref 37–48.5)
HGB BLD-MCNC: 13.1 G/DL (ref 12–16)
IMM GRANULOCYTES # BLD AUTO: 0.01 K/UL (ref 0–0.04)
IMM GRANULOCYTES NFR BLD AUTO: 0.2 % (ref 0–0.5)
LYMPHOCYTES # BLD AUTO: 2.5 K/UL (ref 1–4.8)
LYMPHOCYTES NFR BLD: 46.1 % (ref 18–48)
MCH RBC QN AUTO: 31.6 PG (ref 27–31)
MCHC RBC AUTO-ENTMCNC: 33.3 G/DL (ref 32–36)
MCV RBC AUTO: 95 FL (ref 82–98)
MONOCYTES # BLD AUTO: 0.4 K/UL (ref 0.3–1)
MONOCYTES NFR BLD: 7.5 % (ref 4–15)
NEUTROPHILS # BLD AUTO: 2.3 K/UL (ref 1.8–7.7)
NEUTROPHILS NFR BLD: 42.6 % (ref 38–73)
NRBC BLD-RTO: 0 /100 WBC
PLATELET # BLD AUTO: 218 K/UL (ref 150–350)
PMV BLD AUTO: 10.2 FL (ref 9.2–12.9)
POTASSIUM SERPL-SCNC: 3.8 MMOL/L (ref 3.5–5.1)
PROT SERPL-MCNC: 6.8 G/DL (ref 6–8.4)
RBC # BLD AUTO: 4.15 M/UL (ref 4–5.4)
SODIUM SERPL-SCNC: 140 MMOL/L (ref 136–145)
WBC # BLD AUTO: 5.49 K/UL (ref 3.9–12.7)

## 2019-08-22 PROCEDURE — 80053 COMPREHEN METABOLIC PANEL: CPT

## 2019-08-22 PROCEDURE — 85025 COMPLETE CBC W/AUTO DIFF WBC: CPT

## 2019-08-26 ENCOUNTER — HOSPITAL ENCOUNTER (INPATIENT)
Facility: HOSPITAL | Age: 64
LOS: 2 days | Discharge: HOME | DRG: 247 | End: 2019-08-28
Attending: EMERGENCY MEDICINE | Admitting: INTERNAL MEDICINE
Payer: MEDICARE

## 2019-08-26 DIAGNOSIS — I21.4 NSTEMI (NON-ST ELEVATED MYOCARDIAL INFARCTION): Primary | ICD-10-CM

## 2019-08-26 DIAGNOSIS — R07.9 CHEST PAIN: ICD-10-CM

## 2019-08-26 DIAGNOSIS — I25.10 CAD (CORONARY ARTERY DISEASE): ICD-10-CM

## 2019-08-26 PROBLEM — R00.1 SYMPTOMATIC SINUS BRADYCARDIA: Status: ACTIVE | Noted: 2019-08-26

## 2019-08-26 LAB
ALBUMIN SERPL BCP-MCNC: 3.6 G/DL (ref 3.5–5.2)
ALP SERPL-CCNC: 66 U/L (ref 55–135)
ALT SERPL W/O P-5'-P-CCNC: 16 U/L (ref 10–44)
ANION GAP SERPL CALC-SCNC: 8 MMOL/L (ref 8–16)
AST SERPL-CCNC: 22 U/L (ref 10–40)
BASOPHILS # BLD AUTO: 0.04 K/UL (ref 0–0.2)
BASOPHILS NFR BLD: 0.6 % (ref 0–1.9)
BILIRUB SERPL-MCNC: 0.8 MG/DL (ref 0.1–1)
BILIRUB UR QL STRIP: NEGATIVE
BNP SERPL-MCNC: 101 PG/ML (ref 0–99)
BUN SERPL-MCNC: 23 MG/DL (ref 8–23)
CALCIUM SERPL-MCNC: 9.5 MG/DL (ref 8.7–10.5)
CHLORIDE SERPL-SCNC: 104 MMOL/L (ref 95–110)
CLARITY UR: CLEAR
CO2 SERPL-SCNC: 28 MMOL/L (ref 23–29)
COLOR UR: YELLOW
CREAT SERPL-MCNC: 1 MG/DL (ref 0.5–1.4)
DIFFERENTIAL METHOD: ABNORMAL
EOSINOPHIL # BLD AUTO: 0.1 K/UL (ref 0–0.5)
EOSINOPHIL NFR BLD: 1.1 % (ref 0–8)
ERYTHROCYTE [DISTWIDTH] IN BLOOD BY AUTOMATED COUNT: 12.6 % (ref 11.5–14.5)
EST. GFR  (AFRICAN AMERICAN): >60 ML/MIN/1.73 M^2
EST. GFR  (NON AFRICAN AMERICAN): 58.8 ML/MIN/1.73 M^2
GLUCOSE SERPL-MCNC: 99 MG/DL (ref 70–110)
GLUCOSE UR QL STRIP: NEGATIVE
HCT VFR BLD AUTO: 36.7 % (ref 37–48.5)
HGB BLD-MCNC: 12 G/DL (ref 12–16)
HGB UR QL STRIP: NEGATIVE
IMM GRANULOCYTES # BLD AUTO: 0.02 K/UL (ref 0–0.04)
IMM GRANULOCYTES NFR BLD AUTO: 0.3 % (ref 0–0.5)
INR PPP: 1
KETONES UR QL STRIP: NEGATIVE
LEUKOCYTE ESTERASE UR QL STRIP: NEGATIVE
LYMPHOCYTES # BLD AUTO: 1.7 K/UL (ref 1–4.8)
LYMPHOCYTES NFR BLD: 27 % (ref 18–48)
MAGNESIUM SERPL-MCNC: 1.9 MG/DL (ref 1.6–2.6)
MCH RBC QN AUTO: 31.5 PG (ref 27–31)
MCHC RBC AUTO-ENTMCNC: 32.7 G/DL (ref 32–36)
MCV RBC AUTO: 96 FL (ref 82–98)
MONOCYTES # BLD AUTO: 0.5 K/UL (ref 0.3–1)
MONOCYTES NFR BLD: 7.7 % (ref 4–15)
NEUTROPHILS # BLD AUTO: 4 K/UL (ref 1.8–7.7)
NEUTROPHILS NFR BLD: 63.3 % (ref 38–73)
NITRITE UR QL STRIP: NEGATIVE
NRBC BLD-RTO: 0 /100 WBC
PH UR STRIP: 6 [PH] (ref 5–8)
PLATELET # BLD AUTO: 201 K/UL (ref 150–350)
PMV BLD AUTO: 10.8 FL (ref 9.2–12.9)
POTASSIUM SERPL-SCNC: 3.7 MMOL/L (ref 3.5–5.1)
PROT SERPL-MCNC: 6.7 G/DL (ref 6–8.4)
PROT UR QL STRIP: ABNORMAL
PROTHROMBIN TIME: 13.1 SEC (ref 11.7–14)
RBC # BLD AUTO: 3.81 M/UL (ref 4–5.4)
SODIUM SERPL-SCNC: 140 MMOL/L (ref 136–145)
SP GR UR STRIP: >1.03 (ref 1–1.03)
TROPONIN I SERPL DL<=0.01 NG/ML-MCNC: 0.2 NG/ML (ref 0.02–0.04)
TROPONIN I SERPL DL<=0.01 NG/ML-MCNC: 2.93 NG/ML (ref 0.02–0.04)
TROPONIN I SERPL DL<=0.01 NG/ML-MCNC: 2.93 NG/ML (ref 0.02–0.04)
TSH SERPL DL<=0.005 MIU/L-ACNC: 2.64 UIU/ML (ref 0.34–5.6)
URN SPEC COLLECT METH UR: ABNORMAL
UROBILINOGEN UR STRIP-ACNC: NEGATIVE EU/DL
WBC # BLD AUTO: 6.26 K/UL (ref 3.9–12.7)

## 2019-08-26 PROCEDURE — 99285 EMERGENCY DEPT VISIT HI MDM: CPT | Mod: 25

## 2019-08-26 PROCEDURE — 80053 COMPREHEN METABOLIC PANEL: CPT

## 2019-08-26 PROCEDURE — 83880 ASSAY OF NATRIURETIC PEPTIDE: CPT

## 2019-08-26 PROCEDURE — 25000003 PHARM REV CODE 250: Performed by: HOSPITALIST

## 2019-08-26 PROCEDURE — 85025 COMPLETE CBC W/AUTO DIFF WBC: CPT

## 2019-08-26 PROCEDURE — 25000003 PHARM REV CODE 250: Performed by: EMERGENCY MEDICINE

## 2019-08-26 PROCEDURE — 85610 PROTHROMBIN TIME: CPT

## 2019-08-26 PROCEDURE — 25000003 PHARM REV CODE 250: Performed by: INTERNAL MEDICINE

## 2019-08-26 PROCEDURE — 93005 ELECTROCARDIOGRAM TRACING: CPT

## 2019-08-26 PROCEDURE — 96372 THER/PROPH/DIAG INJ SC/IM: CPT | Mod: 59

## 2019-08-26 PROCEDURE — 25500020 PHARM REV CODE 255: Performed by: EMERGENCY MEDICINE

## 2019-08-26 PROCEDURE — 36415 COLL VENOUS BLD VENIPUNCTURE: CPT

## 2019-08-26 PROCEDURE — 84484 ASSAY OF TROPONIN QUANT: CPT | Mod: 91

## 2019-08-26 PROCEDURE — 81003 URINALYSIS AUTO W/O SCOPE: CPT

## 2019-08-26 PROCEDURE — 83735 ASSAY OF MAGNESIUM: CPT

## 2019-08-26 PROCEDURE — 63600175 PHARM REV CODE 636 W HCPCS: Performed by: EMERGENCY MEDICINE

## 2019-08-26 PROCEDURE — 82962 GLUCOSE BLOOD TEST: CPT

## 2019-08-26 PROCEDURE — 25000003 PHARM REV CODE 250

## 2019-08-26 PROCEDURE — 84443 ASSAY THYROID STIM HORMONE: CPT

## 2019-08-26 PROCEDURE — 20000000 HC ICU ROOM

## 2019-08-26 PROCEDURE — 84484 ASSAY OF TROPONIN QUANT: CPT

## 2019-08-26 RX ORDER — ENOXAPARIN SODIUM 100 MG/ML
40 INJECTION SUBCUTANEOUS EVERY 24 HOURS
Status: DISCONTINUED | OUTPATIENT
Start: 2019-08-26 | End: 2019-08-26

## 2019-08-26 RX ORDER — ONDANSETRON 2 MG/ML
4 INJECTION INTRAMUSCULAR; INTRAVENOUS EVERY 8 HOURS PRN
Status: DISCONTINUED | OUTPATIENT
Start: 2019-08-26 | End: 2019-08-28 | Stop reason: HOSPADM

## 2019-08-26 RX ORDER — PANTOPRAZOLE SODIUM 40 MG/1
40 TABLET, DELAYED RELEASE ORAL DAILY
Status: DISCONTINUED | OUTPATIENT
Start: 2019-08-27 | End: 2019-08-28 | Stop reason: HOSPADM

## 2019-08-26 RX ORDER — POTASSIUM CHLORIDE 750 MG/1
10 CAPSULE, EXTENDED RELEASE ORAL EVERY MORNING
Status: DISCONTINUED | OUTPATIENT
Start: 2019-08-27 | End: 2019-08-28 | Stop reason: HOSPADM

## 2019-08-26 RX ORDER — ACETAMINOPHEN 325 MG/1
650 TABLET ORAL EVERY 8 HOURS PRN
Status: DISCONTINUED | OUTPATIENT
Start: 2019-08-26 | End: 2019-08-28 | Stop reason: HOSPADM

## 2019-08-26 RX ORDER — MORPHINE SULFATE 4 MG/ML
4 INJECTION, SOLUTION INTRAMUSCULAR; INTRAVENOUS EVERY 4 HOURS PRN
Status: DISCONTINUED | OUTPATIENT
Start: 2019-08-26 | End: 2019-08-28 | Stop reason: HOSPADM

## 2019-08-26 RX ORDER — ENOXAPARIN SODIUM 100 MG/ML
30 INJECTION SUBCUTANEOUS EVERY 24 HOURS
Status: DISCONTINUED | OUTPATIENT
Start: 2019-08-26 | End: 2019-08-26

## 2019-08-26 RX ORDER — MORPHINE SULFATE 2 MG/ML
2 INJECTION, SOLUTION INTRAMUSCULAR; INTRAVENOUS EVERY 4 HOURS PRN
Status: DISCONTINUED | OUTPATIENT
Start: 2019-08-26 | End: 2019-08-28 | Stop reason: HOSPADM

## 2019-08-26 RX ORDER — SILVER SULFADIAZINE 10 G/1000G
CREAM TOPICAL 2 TIMES DAILY
Status: DISCONTINUED | OUTPATIENT
Start: 2019-08-27 | End: 2019-08-28 | Stop reason: HOSPADM

## 2019-08-26 RX ORDER — MUPIROCIN 20 MG/G
OINTMENT TOPICAL 2 TIMES DAILY
Status: DISCONTINUED | OUTPATIENT
Start: 2019-08-26 | End: 2019-08-28 | Stop reason: HOSPADM

## 2019-08-26 RX ORDER — CHLORHEXIDINE GLUCONATE ORAL RINSE 1.2 MG/ML
15 SOLUTION DENTAL 2 TIMES DAILY
Status: DISCONTINUED | OUTPATIENT
Start: 2019-08-26 | End: 2019-08-28 | Stop reason: HOSPADM

## 2019-08-26 RX ORDER — ENOXAPARIN SODIUM 100 MG/ML
1 INJECTION SUBCUTANEOUS
Status: DISCONTINUED | OUTPATIENT
Start: 2019-08-26 | End: 2019-08-28 | Stop reason: HOSPADM

## 2019-08-26 RX ORDER — ENOXAPARIN SODIUM 100 MG/ML
1 INJECTION SUBCUTANEOUS
Status: COMPLETED | OUTPATIENT
Start: 2019-08-26 | End: 2019-08-26

## 2019-08-26 RX ORDER — ASPIRIN 325 MG
325 TABLET ORAL
Status: COMPLETED | OUTPATIENT
Start: 2019-08-26 | End: 2019-08-26

## 2019-08-26 RX ORDER — ATORVASTATIN CALCIUM 40 MG/1
40 TABLET, FILM COATED ORAL NIGHTLY
Status: DISCONTINUED | OUTPATIENT
Start: 2019-08-26 | End: 2019-08-28 | Stop reason: HOSPADM

## 2019-08-26 RX ADMIN — IOHEXOL 100 ML: 350 INJECTION, SOLUTION INTRAVENOUS at 01:08

## 2019-08-26 RX ADMIN — ENOXAPARIN SODIUM 60 MG: 100 INJECTION SUBCUTANEOUS at 02:08

## 2019-08-26 RX ADMIN — ASPIRIN 325 MG ORAL TABLET 325 MG: 325 PILL ORAL at 02:08

## 2019-08-26 RX ADMIN — CHLORHEXIDINE GLUCONATE 15 ML: 1.2 RINSE ORAL at 10:08

## 2019-08-26 RX ADMIN — SILVER SULFADIAZINE: 10 CREAM TOPICAL at 11:08

## 2019-08-26 RX ADMIN — MUPIROCIN: 20 OINTMENT TOPICAL at 10:08

## 2019-08-26 RX ADMIN — ATORVASTATIN CALCIUM 40 MG: 40 TABLET, FILM COATED ORAL at 09:08

## 2019-08-26 NOTE — H&P
[facility]  History & Physical    Patient Name: Lorraine Warner  MRN: [id number]  Admission Date: 8/26/2019  Attending Physician: Obed Barker MD  Primary Care Provider: Will Velarde MD         Patient information was obtained from spouse/SO and ER records.     Subjective:     Principal Problem:Symptomatic sinus bradycardia    Chief Complaint:   Chief Complaint   Patient presents with    Chest Pain        HPI: 64-year-old female who is Portuguese-speaking only presents today complaining of substernal chest pain with radiation to her back the pain is described as sharp patient reports pain began 2 days ago and has worsened today.  Patient rates pain as 6/10 patient reports that she has a history of a gastrointestinal tumor of the stomach patient also has a history of GERD hypertension and hyperlipidemia.  Patient reports that she has not had anything like this before/. No SOB/orthopnea/PND/edema    Past Medical History:   Diagnosis Date    Abnormal computed tomography of stomach 10/24/2018    Abnormal CT of the abdomen (soft tissue mass cardia of stomach) 10/24/2018    Gastrointestinal stromal tumor (GIST) of stomach 1/15/2019    GERD (gastroesophageal reflux disease)     Hyperlipidemia     Hypertension     Mass of stomach - cardia 10/24/2018       Past Surgical History:   Procedure Laterality Date    BLADDER SURGERY      LAPAROSCOPIC PARTIAL GASTRECTOMY  11/27/2018    Dr. Garcia        Review of patient's allergies indicates:  No Known Allergies    No current facility-administered medications on file prior to encounter.      Current Outpatient Medications on File Prior to Encounter   Medication Sig    atorvastatin (LIPITOR) 40 MG tablet Take 40 mg by mouth every evening.     chlorthalidone (HYGROTEN) 25 MG Tab Take 12.5 mg by mouth every morning.     omeprazole 20 mg TbEC Take 20 mg by mouth every morning.     potassium chloride (MICRO-K) 10 MEQ CpSR Take 10 mEq  by mouth every morning.     [DISCONTINUED] ranitidine (ZANTAC) 150 MG capsule Take 150 mg by mouth 2 (two) times daily.    [DISCONTINUED] rOPINIRole (REQUIP) 0.25 MG tablet Take 0.25 mg by mouth 3 (three) times daily.     Family History     Problem Relation (Age of Onset)    No Known Problems Mother, Father        Tobacco Use    Smoking status: Never Smoker    Smokeless tobacco: Never Used   Substance and Sexual Activity    Alcohol use: No     Frequency: Never    Drug use: No    Sexual activity: Not on file     Review of Systems   Constitutional: Negative.    HENT: Negative.    Eyes: Negative.    Respiratory: Negative.    Cardiovascular: Negative.    Gastrointestinal: Negative.    Endocrine: Negative.    Genitourinary: Negative.    Musculoskeletal: Negative.    Skin: Negative.    Allergic/Immunologic: Negative.    Neurological: Negative.    Hematological: Negative.    All other systems reviewed and are negative.    Objective:     Vital Signs (Most Recent):  Temp: 97.7 °F (36.5 °C) (08/26/19 1123)  Pulse: (!) 57 (08/26/19 1400)  Resp: 14 (08/26/19 1400)  BP: (!) 120/58 (08/26/19 1400)  SpO2: 100 % (08/26/19 1400) Vital Signs (24h Range):  Temp:  [97.7 °F (36.5 °C)] 97.7 °F (36.5 °C)  Pulse:  [39-68] 57  Resp:  [7-24] 14  SpO2:  [96 %-100 %] 100 %  BP: ()/(53-66) 120/58     Weight: 62.6 kg (138 lb)  Body mass index is 24.45 kg/m².    Physical Exam   Constitutional: She is oriented to person, place, and time. She appears well-developed and well-nourished.   HENT:   Head: Normocephalic and atraumatic.   Right Ear: External ear normal.   Left Ear: External ear normal.   Nose: Nose normal.   Mouth/Throat: Oropharynx is clear and moist.   Eyes: Pupils are equal, round, and reactive to light. Conjunctivae and EOM are normal.   Neck: Normal range of motion. Neck supple.   Cardiovascular: Normal rate, regular rhythm, normal heart sounds and intact distal pulses.   Pulmonary/Chest: Effort normal and breath sounds  normal.   Abdominal: Soft. Bowel sounds are normal.   Musculoskeletal: Normal range of motion.   Neurological: She is alert and oriented to person, place, and time.   Skin: Skin is warm and dry. Capillary refill takes less than 2 seconds.   Psychiatric: She has a normal mood and affect. Her behavior is normal. Judgment and thought content normal.   Nursing note and vitals reviewed.        CRANIAL NERVES     CN III, IV, VI   Pupils are equal, round, and reactive to light.  Extraocular motions are normal.        Significant Labs:   CBC:   Recent Labs   Lab 08/26/19  1227   WBC 6.26   HGB 12.0   HCT 36.7*        CMP:   Recent Labs   Lab 08/26/19  1227      K 3.7      CO2 28   GLU 99   BUN 23   CREATININE 1.0   CALCIUM 9.5   PROT 6.7   ALBUMIN 3.6   BILITOT 0.8   ALKPHOS 66   AST 22   ALT 16   ANIONGAP 8   EGFRNONAA 58.8*     Cardiac Markers:   Recent Labs   Lab 08/26/19  1227   *     Troponin:   Recent Labs   Lab 08/26/19  1227   TROPONINI 0.195*     TSH:   Recent Labs   Lab 08/26/19  1227   TSH 2.640       Significant Imaging: I have reviewed all pertinent imaging results/findings within the past 24 hours.    Assessment/Plan:     No notes have been filed under this hospital service.  Service: Hospital Medicine    VTE Risk Mitigation (From admission, onward)    None             Obed Barker MD  Department of Hospital Medicine   [facility]

## 2019-08-26 NOTE — ED PROVIDER NOTES
Encounter Date: 8/26/2019       History     Chief Complaint   Patient presents with    Chest Pain     64-year-old female who is French-speaking only presents today complaining of substernal chest pain with radiation to her back the pain is described as sharp patient reports pain began 2 days ago and has worsened today.  Patient rates pain as 6/10 patient reports that she has a history of a gastrointestinal tumor of the stomach patient also has a history of GERD hypertension and hyperlipidemia.  Patient reports that she has not had anything like this before.        Review of patient's allergies indicates:  No Known Allergies  Past Medical History:   Diagnosis Date    Abnormal computed tomography of stomach 10/24/2018    Abnormal CT of the abdomen (soft tissue mass cardia of stomach) 10/24/2018    Gastrointestinal stromal tumor (GIST) of stomach 1/15/2019    GERD (gastroesophageal reflux disease)     Hyperlipidemia     Hypertension     Mass of stomach - cardia 10/24/2018     Past Surgical History:   Procedure Laterality Date    BLADDER SURGERY      LAPAROSCOPIC PARTIAL GASTRECTOMY  11/27/2018    Dr. Garcia      Family History   Problem Relation Age of Onset    No Known Problems Mother     No Known Problems Father      Social History     Tobacco Use    Smoking status: Never Smoker    Smokeless tobacco: Never Used   Substance Use Topics    Alcohol use: No     Frequency: Never    Drug use: No     Review of Systems   Constitutional: Negative for fever.   HENT: Negative for congestion, rhinorrhea, sore throat and trouble swallowing.    Eyes: Negative for visual disturbance.   Respiratory: Negative for cough, chest tightness, shortness of breath and wheezing.    Cardiovascular: Positive for chest pain. Negative for palpitations and leg swelling.   Gastrointestinal: Negative for abdominal distention, abdominal pain, constipation, diarrhea, nausea and vomiting.   Genitourinary: Negative for difficulty  urinating, dysuria, flank pain and frequency.   Musculoskeletal: Positive for back pain. Negative for arthralgias, joint swelling and neck pain.   Skin: Negative for color change and rash.   Neurological: Negative for dizziness, syncope, speech difficulty, weakness, numbness and headaches.   All other systems reviewed and are negative.      Physical Exam     Initial Vitals [08/26/19 1123]   BP Pulse Resp Temp SpO2   (!) 145/66 68 18 97.7 °F (36.5 °C) 99 %      MAP       --         Physical Exam    Nursing note and vitals reviewed.  Constitutional: She appears well-developed and well-nourished. She is not diaphoretic. No distress.   HENT:   Head: Normocephalic and atraumatic.   Right Ear: External ear normal.   Left Ear: External ear normal.   Nose: Nose normal.   Mouth/Throat: Oropharynx is clear and moist. No oropharyngeal exudate.   Eyes: Conjunctivae and EOM are normal. Pupils are equal, round, and reactive to light. Right eye exhibits no discharge. Left eye exhibits no discharge. No scleral icterus.   Neck: Normal range of motion. Neck supple. No thyromegaly present. No tracheal deviation present. No JVD present.   Cardiovascular: Regular rhythm, normal heart sounds and intact distal pulses. Exam reveals no gallop and no friction rub.    No murmur heard.  Bradycardia   Pulmonary/Chest: Breath sounds normal. No stridor. No respiratory distress. She has no wheezes. She has no rhonchi. She has no rales. She exhibits no tenderness.   Abdominal: Soft. Bowel sounds are normal. She exhibits no distension and no mass. There is no tenderness. There is no rebound and no guarding.   Musculoskeletal: Normal range of motion. She exhibits no edema or tenderness.   Lymphadenopathy:     She has no cervical adenopathy.   Neurological: She is alert and oriented to person, place, and time. She has normal strength. She displays normal reflexes. No cranial nerve deficit or sensory deficit.   Skin: Skin is warm and dry. No rash and  no abscess noted. No erythema. No pallor.         ED Course   Procedures  Labs Reviewed   CBC W/ AUTO DIFFERENTIAL - Abnormal; Notable for the following components:       Result Value    RBC 3.81 (*)     Hematocrit 36.7 (*)     Mean Corpuscular Hemoglobin 31.5 (*)     All other components within normal limits   COMPREHENSIVE METABOLIC PANEL - Abnormal; Notable for the following components:    eGFR if non  58.8 (*)     All other components within normal limits   TROPONIN I - Abnormal; Notable for the following components:    Troponin I 0.195 (*)     All other components within normal limits    Narrative:     Troponin critical result(s) called and verbal readback obtained from   Jeff Landaverde RN ED.  , 08/26/2019 13:32   B-TYPE NATRIURETIC PEPTIDE - Abnormal; Notable for the following components:     (*)     All other components within normal limits   B-TYPE NATRIURETIC PEPTIDE   MAGNESIUM   PROTIME-INR   TSH   TSH   PROTIME-INR   MAGNESIUM        ECG Results          EKG 12-lead (In process)  Result time 08/26/19 13:09:28    In process by Interface, Lab In [facility] (08/26/19 13:09:28)                 Narrative:    Test Reason : R07.9,    Vent. Rate : 057 BPM     Atrial Rate : 057 BPM     P-R Int : 128 ms          QRS Dur : 072 ms      QT Int : 420 ms       P-R-T Axes : 035 002 -03 degrees     QTc Int : 408 ms    Sinus bradycardia  Otherwise normal ECG  When compared with ECG of 26-AUG-2019 12:34,  Premature atrial complexes are no longer Present  Vent. rate has decreased BY  33 BPM  Nonspecific T wave abnormality, improved in Lateral leads  QT has shortened    Referred By: [email or web address]   SELF           Confirmed By:                              EKG 12-lead (In process)  Result time 08/26/19 13:09:11    In process by Interface, Lab In [facility] (08/26/19 13:09:11)                 Narrative:    Test Reason : R07.9,    Vent. Rate : 062 BPM     Atrial Rate : 062 BPM     P-R Int : 124 ms          QRS  Dur : 080 ms      QT Int : 418 ms       P-R-T Axes : 059 042 082 degrees     QTc Int : 424 ms    Normal sinus rhythm  Nonspecific ST abnormality  Abnormal ECG  No previous ECGs available    Referred By: [email or web address]   SELF           Confirmed By:                             Imaging Results          CTA Chest Abdomen Pelvis (Final result)  Result time 08/26/19 13:48:16    Final result by Ysabel Lehman MD (08/26/19 13:48:16)                 Impression:      No evidence of aortic dissection or aneurysm.    Normal CT a of the chest, abdomen and pelvis    No acute pulmonary process      Electronically signed by: Ysabel Lehman MD  Date:    08/26/2019  Time:    13:48             Narrative:    EXAMINATION:  CTA CHEST ABDOMEN PELVIS    CLINICAL HISTORY:  possible dissection;    TECHNIQUE:  CTA thorax and CT abdomen and pelvis with 100 mL Omnipaque Maximum intensity projection coronal reformations were created at a separate workstation and stored in the patient's permanent medical record.    CMS MANDATED QUALITY DATA - CT RADIATION - 436    All CT scans at this facility utilize dose modulation, iterative reconstruction, and/or weight based dosing when appropriate to reduce radiation dose to as low as reasonably achievable.    COMPARISON:  10/11/2018    FINDINGS:  CTA THORAX:    The aorta is normal in caliber.  There is no evidence of dissection or aneurysm.    Pulmonary arteries are normal in appearance without evidence of emboli.  The heart and great vessels are normal.    There is no hilar, mediastinal or axillary adenopathy.    There are no pulmonary nodules, infiltrates or pleural effusions.    There is a small hiatal hernia.    CT ABDOMEN:    The SMA, celiac, SHERLYN and renal arteries are patent.    The liver is hypodense compatible with fatty infiltration.  There is no biliary duct dilatation.  The spleen, pancreas, gallbladder and adrenal glands are normal.    There are no thick-walled or dilated bowel loops.  The  appendix is normal.  There is no adenopathy or free fluid.    CT PELVIS:    The iliac arteries are normal in caliber.  There is no pelvic adenopathy.  The uterus and ovaries are normal.                               X-Ray Chest AP Portable (Final result)  Result time 08/26/19 13:00:04    Final result by Ysabel Lehman MD (08/26/19 13:00:04)                 Impression:      No acute cardiopulmonary abnormality.      Electronically signed by: Ysabel Lehman MD  Date:    08/26/2019  Time:    13:00             Narrative:    EXAMINATION:  XR CHEST AP PORTABLE    CLINICAL HISTORY:  chest pain;    FINDINGS:  Portable chest at 12:49 11/20/2018 shows normal cardiomediastinal silhouette.    Lungs are clear. Pulmonary vasculature is normal. No acute osseous abnormality.                                 Medical Decision Making:   History:   Old Medical Records: I decided to obtain old medical records.  Initial Assessment:   Patient noted to have severe bradycardia, heart rate in the low 30s, patient's blood pressure within normal limits, given her bradycardia and her chest pain as well as radiation to the back I am concerned for possible dissection, blood pressures in both arms are equal bilaterally will get a CTA of the chest abdomen and pelvis patient will be monitored closely patient placed on pacer pads and demand pacing set to 30.  Will monitor patient closely.              Attending Attestation:             Attending ED Notes:   Troponin elevation, patient has no sign of aortic dissection, patient chest pain has decreased, patient consulted to Cardiology who will follow patient and recommends Lovenox administration, patient will be admitted to Internal Medicine for further evaluation and management             Clinical Impression:       ICD-10-CM ICD-9-CM   1. NSTEMI (non-ST elevated myocardial infarction) I21.4 410.70   2. Chest pain R07.9 786.50                                Salvador Delong MD  08/26/19 [id number]

## 2019-08-26 NOTE — HPI
64-year-old female who is Kinyarwanda-speaking only presents today complaining of substernal chest pain with radiation to her back the pain is described as sharp patient reports pain began 2 days ago and has worsened today.  Patient rates pain as 6/10 patient reports that she has a history of a gastrointestinal tumor of the stomach patient also has a history of GERD hypertension and hyperlipidemia.  Patient reports that she has not had anything like this before/. No SOB/orthopnea/PND/edema

## 2019-08-26 NOTE — ED TRIAGE NOTES
Pt states has been having some left upper back pain that radiates to her chest for the past 3 weeks

## 2019-08-26 NOTE — HOSPITAL COURSE
Patient presented to ED with the above complaints. She had bradycardia in the 30's upon presentation. Troponin = 0.191. TSH 2.6 CTA Chest = no PE  EKG = sinus darya. CXR = NAPD. Her heart rate has improved into the 60's, however as a precaution am admitting to ICU should use of pacer be required. Patient stabilized from heart rhythm point of view. She went to [facility] where she received balloon angioplasty and stent placement in her circumflex artery. She has had an uneventful post operative course and has been cleared by Cardiology for discharge with outpatient fllow-up.  VSS  Lungs: clear  Heart reg  Abdo soft

## 2019-08-26 NOTE — Clinical Note
Catheter is inserted into the ostium   left main. Angiography performed of the left coronary arteries in multiple views. Angiography performed via power injection with .

## 2019-08-26 NOTE — CONSULTS
[facility]  Cardiology  Consult Note    Patient Name: Lorraine Warner  MRN: [id number]  Admission Date: 8/26/2019  Hospital Length of Stay: 0 days  Code Status: Full Code   Attending Provider: Obed Barker MD   Consulting Provider: Chandana Almeida MD  Primary Care Physician: Will Velarde MD  Principal Problem:<principal problem not specified>    Patient information was obtained from patient, spouse/SO and ER records.     Consults  Subjective:     Chief Complaint:  Chest pain     HPI: 64-year-old female who is Icelandic-speaking only presents today complaining of substernal chest pain with radiation to her back the pain is described as sharp patient reports pain began 2 days ago and has worsened today.  Patient rates pain as 6/10 patient reports that she has a history of a gastrointestinal tumor of the stomach patient also has a history of GERD hypertension and hyperlipidemia.  Patient reports that she has not had anything like this before/. No SOB/orthopnea/PND/edema  pain is not exertional, not relief by resting , no diaphoresis , not dm. smoker but has positive history of cad. Pain worsen by ostheporosis meds. In er had vasovagal when blood was drawn.no ekg changes and troponin slight elevated.      Past Medical History:   Diagnosis Date    Abnormal computed tomography of stomach 10/24/2018    Abnormal CT of the abdomen (soft tissue mass cardia of stomach) 10/24/2018    Gastrointestinal stromal tumor (GIST) of stomach 1/15/2019    GERD (gastroesophageal reflux disease)     Hyperlipidemia     Hypertension     Mass of stomach - cardia 10/24/2018       Past Surgical History:   Procedure Laterality Date    BLADDER SURGERY      LAPAROSCOPIC PARTIAL GASTRECTOMY  11/27/2018    Dr. Garcia        Review of patient's allergies indicates:  No Known Allergies    No current facility-administered medications on file prior to encounter.      Current Outpatient Medications on File Prior to  Encounter   Medication Sig    atorvastatin (LIPITOR) 40 MG tablet Take 40 mg by mouth every evening.     chlorthalidone (HYGROTEN) 25 MG Tab Take 12.5 mg by mouth every morning.     omeprazole 20 mg TbEC Take 20 mg by mouth every morning.     potassium chloride (MICRO-K) 10 MEQ CpSR Take 10 mEq by mouth every morning.     [DISCONTINUED] ranitidine (ZANTAC) 150 MG capsule Take 150 mg by mouth 2 (two) times daily.    [DISCONTINUED] rOPINIRole (REQUIP) 0.25 MG tablet Take 0.25 mg by mouth 3 (three) times daily.     Family History     Problem Relation (Age of Onset)    No Known Problems Mother, Father        Tobacco Use    Smoking status: Never Smoker    Smokeless tobacco: Never Used   Substance and Sexual Activity    Alcohol use: No     Frequency: Never    Drug use: No    Sexual activity: Not on file     Review of Systems   Cardiovascular: Positive for chest pain.   All other systems reviewed and are negative.    Objective:     Vital Signs (Most Recent):  Temp: 98.3 °F (36.8 °C) (08/26/19 1701)  Pulse: (!) 52 (08/26/19 1715)  Resp: (!) 21 (08/26/19 1715)  BP: 117/62 (08/26/19 1703)  SpO2: 99 % (08/26/19 1715) Vital Signs (24h Range):  Temp:  [97.7 °F (36.5 °C)-98.3 °F (36.8 °C)] 98.3 °F (36.8 °C)  Pulse:  [39-68] 52  Resp:  [7-24] 21  SpO2:  [96 %-100 %] 99 %  BP: ()/(53-66) 117/62     Weight: 62.6 kg (138 lb)  Body mass index is 24.45 kg/m².    SpO2: 99 %  O2 Device (Oxygen Therapy): room air    No intake or output data in the 24 hours ending 08/26/19 1824    Lines/Drains/Airways     Peripheral Intravenous Line                 Peripheral IV - Single Lumen 08/26/19 1130 20 G Right Antecubital less than 1 day         Peripheral IV - Single Lumen 08/26/19 1228 20 G Left Antecubital less than 1 day                Physical Exam   Constitutional: She is oriented to person, place, and time. She appears well-developed.   Eyes: Pupils are equal, round, and reactive to light.   Cardiovascular: Normal rate.    Pulmonary/Chest: Effort normal.   Abdominal: Soft.   Neurological: She is alert and oriented to person, place, and time.       Significant Labs:   ABG: No results for input(s): PH, PCO2, HCO3, POCSATURATED, BE in the last 48 hours., Blood Culture: No results for input(s): LABBLOO in the last 48 hours., BMP:   Recent Labs   Lab 08/26/19  1227   GLU 99      K 3.7      CO2 28   BUN 23   CREATININE 1.0   CALCIUM 9.5   MG 1.9   , CMP   Recent Labs   Lab 08/26/19  1227      K 3.7      CO2 28   GLU 99   BUN 23   CREATININE 1.0   CALCIUM 9.5   PROT 6.7   ALBUMIN 3.6   BILITOT 0.8   ALKPHOS 66   AST 22   ALT 16   ANIONGAP 8   ESTGFRAFRICA >60.0   EGFRNONAA 58.8*   , CBC   Recent Labs   Lab 08/26/19  1227   WBC 6.26   HGB 12.0   HCT 36.7*      , INR   Recent Labs   Lab 08/26/19  1227   INR 1.0   , Lipid Panel No results for input(s): CHOL, HDL, LDLCALC, TRIG, CHOLHDL in the last 48 hours.,   Pathology Results  (Last 10 years)    None       and Troponin   Recent Labs   Lab 08/26/19  1227   TROPONINI 0.195*       Significant Imaging: X-Ray: CXR: X-Ray Chest 1 View (CXR): No results found for this visit on 08/26/19.  Assessment and Plan:     Active Diagnoses:    Diagnosis Date Noted POA    Chest pain [R07.9] 08/26/2019 Yes    Symptomatic sinus bradycardia [R00.1] 08/26/2019 Yes    NSTEMI (non-ST elevated myocardial infarction) [I21.4] 08/26/2019 Yes    Abnormal CT of the abdomen (soft tissue mass cardia of stomach) [R93.5] 10/24/2018 Yes      Problems Resolved During this Admission:       VTE Risk Mitigation (From admission, onward)        Ordered     enoxaparin injection 60 mg  Every 12 hours (non-standard times)      08/26/19 1719     IP VTE HIGH RISK PATIENT  Once      08/26/19 1714     Place MARCO hose  Until discontinued      08/26/19 1714      plan if cardiac enzymes continue to rise will proceed for cath if negative then lexiscan    Thank you for your consult. I will follow-up with  patient. Please contact us if you have any additional questions.    Chandana Almeida MD  [facility]iology   [facility]

## 2019-08-26 NOTE — Clinical Note
Catheter is repositioned to the aorta. Angiography performed of the aorta. Angiography performed via power injection with 16 mL contrast at 8 mL/s. Aortic root performed

## 2019-08-26 NOTE — ED NOTES
SHANI USED FOR Lithuanian INTERPRETATION. (ROBERTA [id number]) PT PRESENTS WITH BACK PAIN THAT RADIATES INTO CHEST THAT FEELS LIKE PRESSURE AND STABBING. STARTED ABOUT 3-4WEEKS AGO. DENIES SOB AND N/V. INTERMITTENT STOMACH ACHES AND HEADACHES. PAIN IS 7/10 AT THIS TIME. VS WNL. NAD NOTED AT THIS TIME. SR ^X2. CALL LIGHT IN REACH WILL MONITOR.

## 2019-08-26 NOTE — Clinical Note
Significant other Geraldo updated on procedure and notified to go to 2203 to where pt is now located.

## 2019-08-26 NOTE — Clinical Note
Catheter is inserted into the left ventricle.  Angiography performed via power injection with . Pigtail

## 2019-08-26 NOTE — NURSING
Pt NON fluent  In speaking English.   Obtained  Alexis for interpretation, and Dr Almeida here( Speaks fluent Kyrgyz) .  Questions answered,  And offered pt   at any time.   present and speaks some english

## 2019-08-26 NOTE — SUBJECTIVE & OBJECTIVE
Past Medical History:   Diagnosis Date    Abnormal computed tomography of stomach 10/24/2018    Abnormal CT of the abdomen (soft tissue mass cardia of stomach) 10/24/2018    Gastrointestinal stromal tumor (GIST) of stomach 1/15/2019    GERD (gastroesophageal reflux disease)     Hyperlipidemia     Hypertension     Mass of stomach - cardia 10/24/2018       Past Surgical History:   Procedure Laterality Date    BLADDER SURGERY      LAPAROSCOPIC PARTIAL GASTRECTOMY  11/27/2018    Dr. Garcia        Review of patient's allergies indicates:  No Known Allergies    No current facility-administered medications on file prior to encounter.      Current Outpatient Medications on File Prior to Encounter   Medication Sig    atorvastatin (LIPITOR) 40 MG tablet Take 40 mg by mouth every evening.     chlorthalidone (HYGROTEN) 25 MG Tab Take 12.5 mg by mouth every morning.     omeprazole 20 mg TbEC Take 20 mg by mouth every morning.     potassium chloride (MICRO-K) 10 MEQ CpSR Take 10 mEq by mouth every morning.     [DISCONTINUED] ranitidine (ZANTAC) 150 MG capsule Take 150 mg by mouth 2 (two) times daily.    [DISCONTINUED] rOPINIRole (REQUIP) 0.25 MG tablet Take 0.25 mg by mouth 3 (three) times daily.     Family History     Problem Relation (Age of Onset)    No Known Problems Mother, Father        Tobacco Use    Smoking status: Never Smoker    Smokeless tobacco: Never Used   Substance and Sexual Activity    Alcohol use: No     Frequency: Never    Drug use: No    Sexual activity: Not on file     Review of Systems   Constitutional: Negative.    HENT: Negative.    Eyes: Negative.    Respiratory: Negative.    Cardiovascular: Negative.    Gastrointestinal: Negative.    Endocrine: Negative.    Genitourinary: Negative.    Musculoskeletal: Negative.    Skin: Negative.    Allergic/Immunologic: Negative.    Neurological: Negative.    Hematological: Negative.    All other systems reviewed and are negative.    Objective:      Vital Signs (Most Recent):  Temp: 97.7 °F (36.5 °C) (08/26/19 1123)  Pulse: (!) 57 (08/26/19 1400)  Resp: 14 (08/26/19 1400)  BP: (!) 120/58 (08/26/19 1400)  SpO2: 100 % (08/26/19 1400) Vital Signs (24h Range):  Temp:  [97.7 °F (36.5 °C)] 97.7 °F (36.5 °C)  Pulse:  [39-68] 57  Resp:  [7-24] 14  SpO2:  [96 %-100 %] 100 %  BP: ()/(53-66) 120/58     Weight: 62.6 kg (138 lb)  Body mass index is 24.45 kg/m².    Physical Exam   Constitutional: She is oriented to person, place, and time. She appears well-developed and well-nourished.   HENT:   Head: Normocephalic and atraumatic.   Right Ear: External ear normal.   Left Ear: External ear normal.   Nose: Nose normal.   Mouth/Throat: Oropharynx is clear and moist.   Eyes: Pupils are equal, round, and reactive to light. Conjunctivae and EOM are normal.   Neck: Normal range of motion. Neck supple.   Cardiovascular: Normal rate, regular rhythm, normal heart sounds and intact distal pulses.   Pulmonary/Chest: Effort normal and breath sounds normal.   Abdominal: Soft. Bowel sounds are normal.   Musculoskeletal: Normal range of motion.   Neurological: She is alert and oriented to person, place, and time.   Skin: Skin is warm and dry. Capillary refill takes less than 2 seconds.   Psychiatric: She has a normal mood and affect. Her behavior is normal. Judgment and thought content normal.   Nursing note and vitals reviewed.        CRANIAL NERVES     CN III, IV, VI   Pupils are equal, round, and reactive to light.  Extraocular motions are normal.        Significant Labs:   CBC:   Recent Labs   Lab 08/26/19  1227   WBC 6.26   HGB 12.0   HCT 36.7*        CMP:   Recent Labs   Lab 08/26/19  1227      K 3.7      CO2 28   GLU 99   BUN 23   CREATININE 1.0   CALCIUM 9.5   PROT 6.7   ALBUMIN 3.6   BILITOT 0.8   ALKPHOS 66   AST 22   ALT 16   ANIONGAP 8   EGFRNONAA 58.8*     Cardiac Markers:   Recent Labs   Lab 08/26/19  1227   *     Troponin:   Recent Labs    Lab 08/26/19  1227   TROPONINI 0.195*     TSH:   Recent Labs   Lab 08/26/19  1227   TSH 2.640       Significant Imaging: I have reviewed all pertinent imaging results/findings within the past 24 hours.

## 2019-08-26 NOTE — Clinical Note
Catheter is inserted into the ostium   right coronary artery. Angiography performed of the right coronary arteries in multiple views. Angiography performed via power injection with . JR 4.0

## 2019-08-27 LAB
ANION GAP SERPL CALC-SCNC: 10 MMOL/L (ref 8–16)
BUN SERPL-MCNC: 22 MG/DL (ref 8–23)
CALCIUM SERPL-MCNC: 9.7 MG/DL (ref 8.7–10.5)
CATH EF ESTIMATED: 60 %
CATH EF QUANTITATIVE: 60 %
CHLORIDE SERPL-SCNC: 103 MMOL/L (ref 95–110)
CO2 SERPL-SCNC: 26 MMOL/L (ref 23–29)
CREAT SERPL-MCNC: 1.1 MG/DL (ref 0.5–1.4)
EST. GFR  (AFRICAN AMERICAN): >60 ML/MIN/1.73 M^2
EST. GFR  (NON AFRICAN AMERICAN): 52.4 ML/MIN/1.73 M^2
GLUCOSE SERPL-MCNC: 100 MG/DL (ref 70–110)
POTASSIUM SERPL-SCNC: 3.7 MMOL/L (ref 3.5–5.1)
SODIUM SERPL-SCNC: 139 MMOL/L (ref 136–145)
TROPONIN I SERPL DL<=0.01 NG/ML-MCNC: 7.39 NG/ML (ref 0.02–0.04)
TROPONIN I SERPL DL<=0.01 NG/ML-MCNC: 7.39 NG/ML (ref 0.02–0.04)
TROPONIN I SERPL DL<=0.01 NG/ML-MCNC: 9.02 NG/ML (ref 0.02–0.04)

## 2019-08-27 PROCEDURE — 63600175 PHARM REV CODE 636 W HCPCS: Performed by: INTERNAL MEDICINE

## 2019-08-27 PROCEDURE — 25000003 PHARM REV CODE 250: Performed by: SPECIALIST

## 2019-08-27 PROCEDURE — 93567 NJX CAR CTH SPRVLV AORTGRPHY: CPT

## 2019-08-27 PROCEDURE — 36415 COLL VENOUS BLD VENIPUNCTURE: CPT

## 2019-08-27 PROCEDURE — C9600 PERC DRUG-EL COR STENT SING: HCPCS | Mod: LC

## 2019-08-27 PROCEDURE — 25000003 PHARM REV CODE 250: Performed by: INTERNAL MEDICINE

## 2019-08-27 PROCEDURE — 63600175 PHARM REV CODE 636 W HCPCS: Performed by: SPECIALIST

## 2019-08-27 PROCEDURE — C1760 CLOSURE DEV, VASC: HCPCS | Performed by: SPECIALIST

## 2019-08-27 PROCEDURE — C1769 GUIDE WIRE: HCPCS | Performed by: SPECIALIST

## 2019-08-27 PROCEDURE — 80048 BASIC METABOLIC PNL TOTAL CA: CPT

## 2019-08-27 PROCEDURE — 93458 L HRT ARTERY/VENTRICLE ANGIO: CPT | Mod: XU

## 2019-08-27 PROCEDURE — C1725 CATH, TRANSLUMIN NON-LASER: HCPCS | Performed by: SPECIALIST

## 2019-08-27 PROCEDURE — C1894 INTRO/SHEATH, NON-LASER: HCPCS | Performed by: SPECIALIST

## 2019-08-27 PROCEDURE — 84484 ASSAY OF TROPONIN QUANT: CPT

## 2019-08-27 PROCEDURE — 94761 N-INVAS EAR/PLS OXIMETRY MLT: CPT

## 2019-08-27 PROCEDURE — C1874 STENT, COATED/COV W/DEL SYS: HCPCS | Performed by: SPECIALIST

## 2019-08-27 PROCEDURE — 25000003 PHARM REV CODE 250

## 2019-08-27 PROCEDURE — C1887 CATHETER, GUIDING: HCPCS | Performed by: SPECIALIST

## 2019-08-27 PROCEDURE — 20000000 HC ICU ROOM

## 2019-08-27 DEVICE — IMPLANTABLE DEVICE: Type: IMPLANTABLE DEVICE | Site: HEART | Status: FUNCTIONAL

## 2019-08-27 RX ORDER — LIDOCAINE HYDROCHLORIDE 10 MG/ML
INJECTION INFILTRATION; PERINEURAL
Status: DISCONTINUED | OUTPATIENT
Start: 2019-08-27 | End: 2019-08-28 | Stop reason: HOSPADM

## 2019-08-27 RX ORDER — CLOPIDOGREL BISULFATE 75 MG/1
75 TABLET ORAL DAILY
Status: DISCONTINUED | OUTPATIENT
Start: 2019-08-28 | End: 2019-08-28 | Stop reason: HOSPADM

## 2019-08-27 RX ORDER — MIDAZOLAM HYDROCHLORIDE 1 MG/ML
INJECTION INTRAMUSCULAR; INTRAVENOUS
Status: DISCONTINUED | OUTPATIENT
Start: 2019-08-27 | End: 2019-08-28 | Stop reason: HOSPADM

## 2019-08-27 RX ORDER — SODIUM CHLORIDE 450 MG/100ML
INJECTION, SOLUTION INTRAVENOUS CONTINUOUS
Status: DISCONTINUED | OUTPATIENT
Start: 2019-08-27 | End: 2019-08-28

## 2019-08-27 RX ORDER — HEPARIN SODIUM 10000 [USP'U]/ML
INJECTION, SOLUTION INTRAVENOUS; SUBCUTANEOUS
Status: DISCONTINUED | OUTPATIENT
Start: 2019-08-27 | End: 2019-08-28 | Stop reason: HOSPADM

## 2019-08-27 RX ORDER — FENTANYL CITRATE 50 UG/ML
INJECTION, SOLUTION INTRAMUSCULAR; INTRAVENOUS
Status: DISCONTINUED | OUTPATIENT
Start: 2019-08-27 | End: 2019-08-28 | Stop reason: HOSPADM

## 2019-08-27 RX ORDER — CLOPIDOGREL BISULFATE 75 MG/1
TABLET ORAL
Status: DISCONTINUED | OUTPATIENT
Start: 2019-08-27 | End: 2019-08-28 | Stop reason: HOSPADM

## 2019-08-27 RX ADMIN — ATORVASTATIN CALCIUM 40 MG: 40 TABLET, FILM COATED ORAL at 08:08

## 2019-08-27 RX ADMIN — POTASSIUM CHLORIDE 10 MEQ: 750 CAPSULE, EXTENDED RELEASE ORAL at 07:08

## 2019-08-27 RX ADMIN — CHLORHEXIDINE GLUCONATE 15 ML: 1.2 RINSE ORAL at 08:08

## 2019-08-27 RX ADMIN — SILVER SULFADIAZINE: 10 CREAM TOPICAL at 09:08

## 2019-08-27 RX ADMIN — CHLORHEXIDINE GLUCONATE 15 ML: 1.2 RINSE ORAL at 09:08

## 2019-08-27 RX ADMIN — PANTOPRAZOLE SODIUM 40 MG: 40 TABLET, DELAYED RELEASE ORAL at 07:08

## 2019-08-27 RX ADMIN — CHLORTHALIDONE 12.5 MG: 25 TABLET ORAL at 09:08

## 2019-08-27 RX ADMIN — SILVER SULFADIAZINE: 10 CREAM TOPICAL at 08:08

## 2019-08-27 RX ADMIN — MUPIROCIN: 20 OINTMENT TOPICAL at 08:08

## 2019-08-27 RX ADMIN — SODIUM CHLORIDE 1000 ML: 0.45 INJECTION, SOLUTION INTRAVENOUS at 07:08

## 2019-08-27 RX ADMIN — MUPIROCIN: 20 OINTMENT TOPICAL at 09:08

## 2019-08-27 RX ADMIN — ENOXAPARIN SODIUM 60 MG: 100 INJECTION SUBCUTANEOUS at 08:08

## 2019-08-27 NOTE — NURSING TRANSFER
Nursing Transfer Note      8/27/2019     Transfer From: Cath Lab    Transfer via bed    Transfer with  to O2, cardiac monitoring    Transported by 2xRN    Chart send with patient: Yes    Notified: spouse    Patient reassessed at: 08/27/2019, 09:16 (date, time)    Upon arrival to floor: cardiac monitor applied, patient oriented to room, call bell in reach and bed in lowest position, leg straight

## 2019-08-28 VITALS
RESPIRATION RATE: 25 BRPM | TEMPERATURE: 99 F | BODY MASS INDEX: 24.14 KG/M2 | OXYGEN SATURATION: 98 % | WEIGHT: 136.25 LBS | HEIGHT: 63 IN | HEART RATE: 71 BPM | DIASTOLIC BLOOD PRESSURE: 56 MMHG | SYSTOLIC BLOOD PRESSURE: 113 MMHG

## 2019-08-28 PROBLEM — R00.1 SYMPTOMATIC SINUS BRADYCARDIA: Status: RESOLVED | Noted: 2019-08-26 | Resolved: 2019-08-28

## 2019-08-28 PROBLEM — I21.4 NSTEMI (NON-ST ELEVATED MYOCARDIAL INFARCTION): Status: RESOLVED | Noted: 2019-08-26 | Resolved: 2019-08-28

## 2019-08-28 PROBLEM — R93.5 ABNORMAL CT OF THE ABDOMEN: Status: RESOLVED | Noted: 2018-10-24 | Resolved: 2019-08-28

## 2019-08-28 PROBLEM — R07.9 CHEST PAIN: Status: RESOLVED | Noted: 2019-08-26 | Resolved: 2019-08-28

## 2019-08-28 LAB
ANION GAP SERPL CALC-SCNC: 9 MMOL/L (ref 8–16)
BASOPHILS # BLD AUTO: 0.04 K/UL (ref 0–0.2)
BASOPHILS NFR BLD: 0.5 % (ref 0–1.9)
BUN SERPL-MCNC: 22 MG/DL (ref 8–23)
CALCIUM SERPL-MCNC: 9.5 MG/DL (ref 8.7–10.5)
CHLORIDE SERPL-SCNC: 103 MMOL/L (ref 95–110)
CO2 SERPL-SCNC: 27 MMOL/L (ref 23–29)
CREAT SERPL-MCNC: 1.2 MG/DL (ref 0.5–1.4)
DIFFERENTIAL METHOD: ABNORMAL
EOSINOPHIL # BLD AUTO: 0.1 K/UL (ref 0–0.5)
EOSINOPHIL NFR BLD: 1.4 % (ref 0–8)
ERYTHROCYTE [DISTWIDTH] IN BLOOD BY AUTOMATED COUNT: 12.7 % (ref 11.5–14.5)
EST. GFR  (AFRICAN AMERICAN): 54.4 ML/MIN/1.73 M^2
EST. GFR  (NON AFRICAN AMERICAN): 47.2 ML/MIN/1.73 M^2
GLUCOSE SERPL-MCNC: 107 MG/DL (ref 70–110)
HCT VFR BLD AUTO: 38.9 % (ref 37–48.5)
HGB BLD-MCNC: 12.9 G/DL (ref 12–16)
IMM GRANULOCYTES # BLD AUTO: 0.03 K/UL (ref 0–0.04)
IMM GRANULOCYTES NFR BLD AUTO: 0.4 % (ref 0–0.5)
LYMPHOCYTES # BLD AUTO: 2.3 K/UL (ref 1–4.8)
LYMPHOCYTES NFR BLD: 29.1 % (ref 18–48)
MCH RBC QN AUTO: 31.5 PG (ref 27–31)
MCHC RBC AUTO-ENTMCNC: 33.2 G/DL (ref 32–36)
MCV RBC AUTO: 95 FL (ref 82–98)
MONOCYTES # BLD AUTO: 0.7 K/UL (ref 0.3–1)
MONOCYTES NFR BLD: 8.6 % (ref 4–15)
NEUTROPHILS # BLD AUTO: 4.8 K/UL (ref 1.8–7.7)
NEUTROPHILS NFR BLD: 60 % (ref 38–73)
NRBC BLD-RTO: 0 /100 WBC
PLATELET # BLD AUTO: 204 K/UL (ref 150–350)
PMV BLD AUTO: 11.1 FL (ref 9.2–12.9)
POTASSIUM SERPL-SCNC: 3.9 MMOL/L (ref 3.5–5.1)
RBC # BLD AUTO: 4.09 M/UL (ref 4–5.4)
SODIUM SERPL-SCNC: 139 MMOL/L (ref 136–145)
WBC # BLD AUTO: 7.91 K/UL (ref 3.9–12.7)

## 2019-08-28 PROCEDURE — 25000003 PHARM REV CODE 250: Performed by: INTERNAL MEDICINE

## 2019-08-28 PROCEDURE — 85025 COMPLETE CBC W/AUTO DIFF WBC: CPT

## 2019-08-28 PROCEDURE — 36415 COLL VENOUS BLD VENIPUNCTURE: CPT

## 2019-08-28 PROCEDURE — 94761 N-INVAS EAR/PLS OXIMETRY MLT: CPT

## 2019-08-28 PROCEDURE — 80048 BASIC METABOLIC PNL TOTAL CA: CPT

## 2019-08-28 PROCEDURE — 25000003 PHARM REV CODE 250: Performed by: SPECIALIST

## 2019-08-28 PROCEDURE — 63600175 PHARM REV CODE 636 W HCPCS: Performed by: INTERNAL MEDICINE

## 2019-08-28 PROCEDURE — 25000003 PHARM REV CODE 250

## 2019-08-28 RX ORDER — CLOPIDOGREL BISULFATE 75 MG/1
75 TABLET ORAL DAILY
Qty: 30 TABLET | Refills: 11 | Status: SHIPPED | OUTPATIENT
Start: 2019-08-29 | End: 2021-08-06 | Stop reason: ALTCHOICE

## 2019-08-28 RX ADMIN — CHLORHEXIDINE GLUCONATE 15 ML: 1.2 RINSE ORAL at 08:08

## 2019-08-28 RX ADMIN — ENOXAPARIN SODIUM 60 MG: 100 INJECTION SUBCUTANEOUS at 08:08

## 2019-08-28 RX ADMIN — CLOPIDOGREL BISULFATE 75 MG: 75 TABLET, FILM COATED ORAL at 08:08

## 2019-08-28 RX ADMIN — PANTOPRAZOLE SODIUM 40 MG: 40 TABLET, DELAYED RELEASE ORAL at 06:08

## 2019-08-28 RX ADMIN — SILVER SULFADIAZINE: 10 CREAM TOPICAL at 08:08

## 2019-08-28 RX ADMIN — MUPIROCIN: 20 OINTMENT TOPICAL at 08:08

## 2019-08-28 RX ADMIN — CHLORTHALIDONE 12.5 MG: 25 TABLET ORAL at 08:08

## 2019-08-28 RX ADMIN — POTASSIUM CHLORIDE 10 MEQ: 750 CAPSULE, EXTENDED RELEASE ORAL at 08:08

## 2019-08-28 NOTE — DISCHARGE INSTRUCTIONS
Went over discharge with pt's spouse. Dr. Almeida at bedside to go over discharge with pt. Ok to discharge per MD.

## 2019-08-28 NOTE — PROGRESS NOTES
[facility] hospital  Cardiology  Progress Note    Patient Name: Lorraine Warner  MRN: [id number]  Admission Date: 8/26/2019  Hospital Length of Stay: 2 days  Code Status: Full Code   Attending Physician: Obed Barker MD   Primary Care Physician: Will Velarde MD  Expected Discharge Date: 8/28/2019  Principal Problem:NSTEMI (non-ST elevated myocardial infarction)    Subjective:     Hospital Course: had NSTEMI, had stent distal circumflex    Interval History: pain free, groin stable    Review of Systems   All other systems reviewed and are negative.    Objective:     Vital Signs (Most Recent):  Temp: 98.6 °F (37 °C) (08/28/19 1101)  Pulse: 68 (08/28/19 1101)  Resp: 16 (08/28/19 1101)  BP: 127/62 (08/28/19 1101)  SpO2: 96 % (08/28/19 1101) Vital Signs (24h Range):  Temp:  [98 °F (36.7 °C)-98.6 °F (37 °C)] 98.6 °F (37 °C)  Pulse:  [61-75] 68  Resp:  [14-45] 16  SpO2:  [94 %-98 %] 96 %  BP: ()/(53-64) 127/62     Weight: 61.8 kg (136 lb 4.3 oz)  Body mass index is 24.14 kg/m².    SpO2: 96 %  O2 Device (Oxygen Therapy): room air      Intake/Output Summary (Last 24 hours) at 8/28/2019 1329  Last data filed at 8/28/2019 0630  Gross per 24 hour   Intake 1240 ml   Output 2450 ml   Net -1210 ml       Lines/Drains/Airways     Peripheral Intravenous Line                 Peripheral IV - Single Lumen 08/26/19 1130 20 G Right Antecubital 2 days         Peripheral IV - Single Lumen 08/26/19 1228 20 G Left Antecubital 2 days                Physical Exam   Constitutional: She appears well-developed.   Cardiovascular: Normal rate.   Pulmonary/Chest: Effort normal.   Abdominal: Soft.   Neurological: She is alert.   Skin: Skin is warm.       Significant Labs:   ABG: No results for input(s): PH, PCO2, HCO3, POCSATURATED, BE in the last 48 hours., BMP:   Recent Labs   Lab 08/27/19  0622 08/28/19  0357    107    139   K 3.7 3.9    103   CO2 26 27   BUN 22 22   CREATININE 1.1 1.2   CALCIUM 9.7 9.5   ,  CMP   Recent Labs   Lab 08/27/19  0622 08/28/19  0357    139   K 3.7 3.9    103   CO2 26 27    107   BUN 22 22   CREATININE 1.1 1.2   CALCIUM 9.7 9.5   ANIONGAP 10 9   ESTGFRAFRICA >60.0 54.4*   EGFRNONAA 52.4* 47.2*   , CBC   Recent Labs   Lab 08/28/19  0357   WBC 7.91   HGB 12.9   HCT 38.9      , Lipid Panel No results for input(s): CHOL, HDL, LDLCALC, TRIG, CHOLHDL in the last 48 hours.,   Pathology Results  (Last 10 years)    None       and Troponin   Recent Labs   Lab 08/26/19  1812 08/26/19  2356 08/27/19  0622   TROPONINI 2.927*  2.927* 7.394*  7.394* 9.015*       Significant Imaging: X-Ray: CXR: X-Ray Chest 1 View (CXR): No results found for this visit on 08/26/19.  Assessment and Plan:     Brief HPI:     Active Diagnoses:      Problems Resolved During this Admission:    Diagnosis Date Noted Date Resolved POA    PRINCIPAL PROBLEM:  NSTEMI (non-ST elevated myocardial infarction) [I21.4] 08/26/2019 08/28/2019 Yes    Chest pain [R07.9] 08/26/2019 08/28/2019 Yes    Symptomatic sinus bradycardia [R00.1] 08/26/2019 08/28/2019 Yes    Abnormal CT of the abdomen (soft tissue mass cardia of stomach) [R93.5] 10/24/2018 08/28/2019 Yes       VTE Risk Mitigation (From admission, onward)        Ordered     heparin (porcine) injection  As needed (PRN)      08/27/19 0836     enoxaparin injection 60 mg  Every 12 hours (non-standard times)      08/26/19 1719     IP VTE HIGH RISK PATIENT  Once      08/26/19 1714     Place MARCO hose  Until discontinued      08/26/19 1714          Chandana lAmeida MD  Cardiology  [facility]

## 2019-08-28 NOTE — PLAN OF CARE
08/28/19 0827   Patient Assessment/Suction   Level of Consciousness (AVPU) alert   Respiratory Effort Normal;Unlabored   Expansion/Accessory Muscles/Retractions no use of accessory muscles;expansion symmetric;no retractions   Rhythm/Pattern, Respiratory unlabored;pattern regular   PRE-TX-O2   O2 Device (Oxygen Therapy) room air   SpO2 98 %   Pulse Oximetry Type Continuous   $ Pulse Oximetry - Multiple Charge Pulse Oximetry - Multiple   Pulse 75   Resp (!) 23

## 2019-08-28 NOTE — DISCHARGE SUMMARY
[facility] Medicine  Discharge Summary      Patient Name: Lorraine Warner  MRN: [id number]  Admission Date: 8/26/2019  Hospital Length of Stay: 2 days  Discharge Date and Time:  08/28/2019 4:16 PM  Attending Physician: No att. providers found   Discharging Provider: Lakeshia Barker MD  Primary Care Provider: Will Velarde MD      HPI:   64-year-old female who is Khmer-speaking only presents today complaining of substernal chest pain with radiation to her back the pain is described as sharp patient reports pain began 2 days ago and has worsened today.  Patient rates pain as 6/10 patient reports that she has a history of a gastrointestinal tumor of the stomach patient also has a history of GERD hypertension and hyperlipidemia.  Patient reports that she has not had anything like this before/. No SOB/orthopnea/PND/edema    Procedure(s) (LRB):  ANGIOGRAM,CORONARY,WITH LEFT HEART CATHETERIZATION (N/A)  Stent, Drug Eluting, Single Vessel, Coronary      Hospital Course:   Patient presented to ED with the above complaints. She had bradycardia in the 30's upon presentation. Troponin = 0.191. TSH 2.6 CTA Chest = no PE  EKG = sinus darya. CXR = NAPD. Her heart rate has improved into the 60's, however as a precaution am admitting to ICU should use of pacer be required. Patient stabilized from heart rhythm point of view. [facility] went to [facility] where she received balloon angioplasty and stent placement in her circumflex artery. She has had an uneventful post operative course and has been cleared by Cardiology for discharge with outpatient fllow-up.  VSS  Lungs: clear  Heart reg  Abdo soft     Consults:   Consults (From admission, onward)        Status Ordering Provider     Inpatient consult to Cardiology  Once     Provider:  Oli Villeda MD    Completed MATT MAXWELL     Inpatient consult to Cardiology  Once     Provider:  Chandana Almeida MD    Acknowledged LAKESHIA BARKER     Inpatient  consult to Internal Medicine  Once     Provider:  (Not yet assigned)    Acknowledged MATT MAXWELL          No new Assessment & Plan notes have been filed under this hospital service since the last note was generated.  Service: Hospital Medicine    Final Active Diagnoses:      Problems Resolved During this Admission:    Diagnosis Date Noted Date Resolved POA    PRINCIPAL PROBLEM:  NSTEMI (non-ST elevated myocardial infarction) [I21.4] 08/26/2019 08/28/2019 Yes    Chest pain [R07.9] 08/26/2019 08/28/2019 Yes    Symptomatic sinus bradycardia [R00.1] 08/26/2019 08/28/2019 Yes    Abnormal CT of the abdomen (soft tissue mass cardia of stomach) [R93.5] 10/24/2018 08/28/2019 Yes       Discharged Condition: good    Disposition: Home or Self Care    Follow Up:  Follow-up Information     Chandana Almeida MD In 2 weeks.    Specialty:  Cardiology  Contact information:  [address] [facility] [address]  [phone]             Chandana Almeida MD .    Specialty:  Cardiology  Contact information:  [address] [facility] [address]  [phone]                 Patient Instructions:      Ambulatory referral to Cardiology   Referral Priority: Routine Referral Type: Consultation   Referral Reason: Patient Preference   Referred to Provider: CHANDANA ALMEIDA. Requested Specialty: Cardiology   Number of Visits Requested: 1     Diet Cardiac     Activity as tolerated       Significant Diagnostic Studies: Labs:   BMP:   Recent Labs   Lab 08/27/19  0622 08/28/19  0357    107    139   K 3.7 3.9    103   CO2 26 27   BUN 22 22   CREATININE 1.1 1.2   CALCIUM 9.7 9.5    and CBC   Recent Labs   Lab 08/28/19  0357   WBC 7.91   HGB 12.9   HCT 38.9          Pending Diagnostic Studies:     None         Medications:  Reconciled Home Medications:      Medication List      START taking these medications    clopidogrel 75 mg tablet  Commonly known as:  PLAVIX  Take 1 tablet (75 mg total) by mouth once  daily.  Start taking on:  8/29/2019        CONTINUE taking these medications    atorvastatin 40 MG tablet  Commonly known as:  LIPITOR  Take 40 mg by mouth every evening.     chlorthalidone 25 MG Tab  Commonly known as:  HYGROTEN  Take 12.5 mg by mouth every morning.     omeprazole 20 mg Tbec  Take 20 mg by mouth every morning.     potassium chloride 10 MEQ Cpsr  Commonly known as:  MICRO-K  Take 10 mEq by mouth every morning.            Indwelling Lines/Drains at time of discharge:   Lines/Drains/Airways          None          Time spent on the discharge of patient: 32 minutes  Patient was seen and examined on the date of discharge and determined to be suitable for discharge.         Obed Barker MD  Department of Hospital Medicine  [facility]

## 2019-08-28 NOTE — PLAN OF CARE
08/28/19 1252   Discharge Assessment   Assessment Type Discharge Planning Reassessment   Pt to go home with no needs.Cm agrees.

## 2019-10-04 DIAGNOSIS — R13.19 INTERMITTENT DYSPHAGIA: Primary | ICD-10-CM

## 2019-12-20 ENCOUNTER — HOSPITAL ENCOUNTER (OUTPATIENT)
Dept: RADIOLOGY | Facility: HOSPITAL | Age: 64
Discharge: HOME | End: 2019-12-20
Attending: INTERNAL MEDICINE
Payer: MEDICARE

## 2019-12-20 DIAGNOSIS — R13.19 INTERMITTENT DYSPHAGIA: ICD-10-CM

## 2019-12-20 PROCEDURE — 74220 X-RAY XM ESOPHAGUS 1CNTRST: CPT | Mod: TC

## 2020-04-16 ENCOUNTER — TELEPHONE (OUTPATIENT)
Dept: HEMATOLOGY/ONCOLOGY | Facility: CLINIC | Age: 65
End: 2020-04-16

## 2020-07-20 NOTE — PROGRESS NOTES
"[facility] Hematology/Oncology  PROGRESS NOTE -  Follow-up Visit      Subjective:       Patient ID:   NAME: Lorraine Warner : 1955     65 y.o. female    Referring Doc: Judith  Other Physicians: Radha Kumar    Chief Complaint:  gastric submucosal nodule/GIST f/u        History of Present Illness:     Patient returns today for a  regularly scheduled follow-up visit.  The patient is here today to go over the results of the recently ordered labs, tests and studies. She is here with her . She previously saw Dr Garcia with Gen Surgery and had subsequent resection of the mass on 2018. Teresita with our office helped translate. She is doing ok with no new issues.   She denies any CP, SOB, HA's or N/V.       She last saw Dr Wong about 4 months ago and had repeat scope which was "good" per patient     Discussed covid19 precautions      ROS:   GEN: normal without any fever, night sweats or weight loss  HEENT: normal with no HA's, sore throat, stiff neck, changes in vision  CV: normal with no CP, SOB, PND, MARSHALL or orthopnea  PULM: normal with no SOB, cough, hemoptysis, sputum or pleuritic pain  GI: normal with no abdominal pain, nausea, vomiting, constipation, diarrhea, melanotic stools, BRBPR, or hematemesis  : normal with no hematuria, dysuria  BREAST: normal with no mass, discharge, pain  SKIN: normal with no rash, erythema, bruising, or swelling    Allergies:  Review of patient's allergies indicates:  No Known Allergies    Medications:    Current Outpatient Medications:     atorvastatin (LIPITOR) 40 MG tablet, Take 40 mg by mouth every evening. , Disp: , Rfl:     chlorthalidone (HYGROTEN) 25 MG Tab, Take 12.5 mg by mouth every morning. , Disp: , Rfl:     clopidogrel (PLAVIX) 75 mg tablet, Take 1 tablet (75 mg total) by mouth once daily., Disp: 30 tablet, Rfl: 11    omeprazole 20 mg TbEC, Take 20 mg by mouth every morning. , Disp: , Rfl:     potassium chloride (MICRO-K) 10 MEQ CpSR, Take " 10 mEq by mouth every morning. , Disp: , Rfl:     PMHx/PSHx Updates:  See patient's last visit with me on 8/21/2019  See H&P on 10/25/2018        Pathology:  Cancer Staging      Gastric Wedge Resection: 11/27/2018:    FINAL DIAGNOSIS:  POSTERIOR STOMACH, WEDGE RESECTION:   GASTROINTESTINAL STROMAL TUMOR (GIST), MIXED SPINDLE CELL AND  EPITHELIOID TYPE.    2.5 CM IN GREATEST DIMENSIONS.    THE SHAVED SURGICAL MARGIN OF RESECTION IS FREE OF TUMOR.    THE TUMOR FOCALLY EXTENDS TO THE SEROSAL SURFACE.  pT2NX, (Stage IA).  G1: Low Grade with mitotic rate less than or equal to 5/5mm2  KIT ():     Positive.   DOG-1:     Positive    RISK ASSESSMENT:   Very low risk (1.9%), Based on the strict criteria of mitotic count  and size of tumor. A moderate risk cannot be entirely   excluded.    Objective:     Vitals:  Blood pressure (!) 153/79, pulse 79, temperature 97.6 °F (36.4 °C), resp. rate 19, weight 63.3 kg (139 lb 9.6 oz).    Physical Examination:   GEN: no apparent distress, comfortable; AAOx3  HEAD: atraumatic and normocephalic  EYES: no pallor, no icterus, PERRLA  ENT: OMM, no pharyngeal erythema, external ears WNL; no nasal discharge; no thrush  NECK: no masses, thyroid normal, trachea midline, no LAD/LN's, supple  CV: RRR with no murmur; normal pulse; normal S1 and S2; no pedal edema  CHEST: Normal respiratory effort; CTAB; normal breath sounds; no wheeze or crackles  ABDOM: nontender and nondistended; soft; normal bowel sounds; no rebound/guarding  MUSC/Skeletal: ROM normal; no crepitus; joints normal; no deformities or arthropathy  EXTREM: no clubbing, cyanosis, inflammation or swelling  SKIN: no rashes, lesions, ulcers, petechiae or subcutaneous nodules  : no amaro  NEURO: grossly intact; motor/sensory WNL; AAOx3; no tremors  PSYCH: normal mood, affect and behavior  LYMPH: normal cervical, supraclavicular, axillary and groin LN's            Labs:        Lab Results   Component Value Date    WBC 7.91  08/28/2019    HGB 12.9 08/28/2019    HCT 38.9 08/28/2019    MCV 95 08/28/2019     08/28/2019     CMP  Sodium   Date Value Ref Range Status   08/28/2019 139 136 - 145 mmol/L Final   01/17/2019 138 134 - 144 mmol/L      Potassium   Date Value Ref Range Status   08/28/2019 3.9 3.5 - 5.1 mmol/L Final     Chloride   Date Value Ref Range Status   08/28/2019 103 95 - 110 mmol/L Final   01/17/2019 98 98 - 110 mmol/L      CO2   Date Value Ref Range Status   08/28/2019 27 23 - 29 mmol/L Final     Glucose   Date Value Ref Range Status   08/28/2019 107 70 - 110 mg/dL Final   01/17/2019 114 (H) 70 - 99 mg/dL      BUN, Bld   Date Value Ref Range Status   08/28/2019 22 8 - 23 mg/dL Final     Creatinine   Date Value Ref Range Status   08/28/2019 1.2 0.5 - 1.4 mg/dL Final   01/17/2019 1.08 0.60 - 1.40 mg/dL      Calcium   Date Value Ref Range Status   08/28/2019 9.5 8.7 - 10.5 mg/dL Final     Total Protein   Date Value Ref Range Status   08/26/2019 6.7 6.0 - 8.4 g/dL Final     Albumin   Date Value Ref Range Status   08/26/2019 3.6 3.5 - 5.2 g/dL Final   01/17/2019 4.2 3.1 - 4.7 g/dL      Total Bilirubin   Date Value Ref Range Status   08/26/2019 0.8 0.1 - 1.0 mg/dL Final     Comment:     For infants and newborns, interpretation of results should be based  on gestational age, weight and in agreement with clinical  observations.  Premature Infant recommended reference ranges:  Up to 24 hours.............<8.0 mg/dL  Up to 48 hours............<12.0 mg/dL  3-5 days..................<15.0 mg/dL  6-29 days.................<15.0 mg/dL       Alkaline Phosphatase   Date Value Ref Range Status   08/26/2019 66 55 - 135 U/L Final     AST   Date Value Ref Range Status   08/26/2019 22 10 - 40 U/L Final     ALT   Date Value Ref Range Status   08/26/2019 16 10 - 44 U/L Final     Anion Gap   Date Value Ref Range Status   08/28/2019 9 8 - 16 mmol/L Final     eGFR if    Date Value Ref Range Status   08/28/2019 54.4 (A) >60  mL/min/1.73 m^2 Final     eGFR if non    Date Value Ref Range Status   08/28/2019 47.2 (A) >60 mL/min/1.73 m^2 Final     Comment:     Calculation used to obtain the estimated glomerular filtration  rate (eGFR) is the CKD-EPI equation.              Radiology/Diagnostic Studies:        PET  7/29/2019:  Impression       No FDG PET/CT findings to suggest recurrent or metastatic disease.             Chest CT 4/24/2019    IMPRESSION:  1. No acute abnormality seen.  2. The 2 lung nodules seen on CT PET scan dated 01/29/2019 represent calcified  granulomas.  3. There are several small perifissural lymph nodes in the right lung measuring  5 mm or less in diameter.  4. There  is no evidence of lung metastasis.  5. No change in the sclerotic focus in the right scapula described on the  previous PET scan and increases the probability of it representing a benign  lesion.        PET 1/15/2019:  IMPRESSION:    1. No current convincing evidence for residual malignancy or metastatic disease.    2. Incidental findings tiny noncalcified left lung nodules and sclerotic focus  in right scapula. Benign etiologies are favored, although metastatic disease is  conceivable but felt less likely. CT thorax without IV contrast follow-up is  recommended in 3 months to begin to document prolonged stability if imaging  follow-up suggested for at least 2 years. If old outside CTs of the chest are  available to document prolonged stability and these are made available,  addendum can be issued at that time.    3. Focus of FDG uptake along deep aspect of umbilicus without CT correlate is  likely inflammatory in nature and related to recent surgery.        I have reviewed all available lab results and radiology reports.    Assessment/Plan:   (1) 65 y.o. female with diagnosis of a submucosal nodule of the gastric cardia who has been referred by Dr Yunior Wong with GI for evaluation by medical hematology/oncology.   - She had originally  "presented with abdominal pain and underwent EGD with Dr Wong on 10/19/2018.   - She had a CT scan on 10/11/2018 which showed a 2.7 x 1.6cm soft tissue mass in the cardia of the stomach.   - on EGD, he found a submucosal nodule in the cardia of the stomach.   - Pathology from the initial biopsy seems to have been nondiagnostic.   - she saw Dr Garcia with Gen Surg and underwent a wedge resection on 11/27/2018 with the pathology ultimately showing GIST  - G1: Low Grade; pT2NX, (Stage IA).  - upon review of the latest guidelines, she should not need any adjuvant therapy with the drug Gleevec due to the low-grade nature of her GIST  - "According to pathologist, her tumor was 2.5cm but was low grade with mitotic rate < or = to 5/50 hpf  - her risk assessment as a result is very low risk at 1.9% risk on metastasis"   - as a result, based on the latest NCCN guidelines (v. 1.2019) I would recommend surveillance only and she should not need Gleevec therapy at this time    - she had PEt on 7/29/2019 7/21/2020:  - She last saw Dr Wong about 4 months ago and had repeat scope which was "good" per patient   - she is due for repeat labs and scnas    (2) HTN and hypercholesterolemia     (3) Hernia     (4) Chronic gastritis  -    (5) Low potassium     (6) S/p prior bladder drainage issue          VISIT DIAGNOSES:      Gastrointestinal stromal tumor (GIST) of stomach    Abnormal computed tomography of stomach    Abnormal findings on esophagogastroduodenoscopy (EGD)    Mass of stomach - cardia          PLAN:  1. She needs to f/u with GI as directed by them  2. No indication for Gleevec at this time  3. F/u with PCP, GI, Gen Surg etc  4. Repeat PET for Aug 2020  5. RTC in  6 months   Fax note to Judith Velarde; Radha;     Discussion:     Pathology Discussion:    I reviewed and discussed the pathology report(s) and radiograph reports (if available) in as simple to understand and/or laymen's terms to the best of my ability. I " "had an indepth conversation with the patient and went over the patient's individual diagnosis based on the information that was currently available. I discussed the TNM staging process with regard to the patient's particular cancer type, and the calculated stage based on the currently available TNM data and literature. I discussed the available prognostic data with regard to the current staging information and how it relates to the prognosis of their particular neoplastic process.          NCCN Guidelines:    I discussed the available treatment option(s) in accordance with the latest literature from the NCCN Clinical Practice Guidelines for the patient's particular type of cancer disorder. The NCCN Guidelines provide a "document evidence-based (and) consensus-driven management" of the care of oncology patients. The treatment recommendations were made not only in accordance to the NCCN guidelines, but also factored in to account the patient's overall age, condition, performance status and their medical co-morbidities. I went over the risks and benefits of the the treatment options (if any could be made) with regard to their particular cancer type, their cancer stage, their age, and their co-morbidities.         (the NCCN guidelines are included on the chart)    COVID-19 Discussion:    I had long discussion with patient and any applicable family about the COVID-19 coronavirus epidemic and the recommended precautions with regard to cancer and/or hematology patients. I have re-iterated the CDC recommendations for adequate hand washing, use of hand -like products, and coughing into elbow, etc. In addition, especially for our patients who are on chemotherapy and/or our otherwise immunocompromised patients, I have recommended avoidance of crowds, including movie theaters, restaurants, churches, etc. I have recommended avoidance of any sick or symptomatic family members and/or friends. I have also recommended " avoidance of any raw and unwashed food products, and general avoidance of food items that have not been prepared by themselves. The patient has been asked to call us immediately with any symptom developments, issues, questions or other general concerns.       I spent over 25 mins of time with the patient. Reviewed results of the recently ordered labs, tests and studies; made directives with regards to the results. Over half of this time was spent couseling and coordinating care.    I have explained all of the above in detail and the patient understands all of the current recommendation(s). I have answered all of their questions to the best of my ability and to their complete satisfaction.   The patient is to continue with the current management plan.    Electronically signed by William Joe MD

## 2020-07-21 ENCOUNTER — OFFICE VISIT (OUTPATIENT)
Dept: HEMATOLOGY/ONCOLOGY | Facility: CLINIC | Age: 65
End: 2020-07-21
Payer: MEDICARE

## 2020-07-21 VITALS
DIASTOLIC BLOOD PRESSURE: 79 MMHG | SYSTOLIC BLOOD PRESSURE: 153 MMHG | HEART RATE: 79 BPM | WEIGHT: 139.63 LBS | BODY MASS INDEX: 24.73 KG/M2 | TEMPERATURE: 98 F | RESPIRATION RATE: 19 BRPM

## 2020-07-21 DIAGNOSIS — R93.3 ABNORMAL COMPUTED TOMOGRAPHY OF STOMACH: ICD-10-CM

## 2020-07-21 DIAGNOSIS — R19.8 ABNORMAL FINDINGS ON ESOPHAGOGASTRODUODENOSCOPY (EGD): ICD-10-CM

## 2020-07-21 DIAGNOSIS — K31.89 MASS OF STOMACH: ICD-10-CM

## 2020-07-21 DIAGNOSIS — C49.A2 GASTROINTESTINAL STROMAL TUMOR (GIST) OF STOMACH: Primary | ICD-10-CM

## 2020-07-21 PROCEDURE — 99214 PR OFFICE/OUTPT VISIT, EST, LEVL IV, 30-39 MIN: ICD-10-PCS | Mod: S$GLB,,, | Performed by: INTERNAL MEDICINE

## 2020-07-21 PROCEDURE — 99214 OFFICE O/P EST MOD 30 MIN: CPT | Mod: S$GLB,,, | Performed by: INTERNAL MEDICINE

## 2020-08-21 ENCOUNTER — TELEPHONE (OUTPATIENT)
Dept: HEMATOLOGY/ONCOLOGY | Facility: CLINIC | Age: 65
End: 2020-08-21

## 2020-08-21 NOTE — TELEPHONE ENCOUNTER
----- Message from Tenisha Mclain sent at 8/17/2020 12:12 PM CDT -----  Please call them back at [phone]. Syriac

## 2020-08-21 NOTE — TELEPHONE ENCOUNTER
Called patient to inform her that the PET is still pending authorization. Will call her with an update when it is available. AE

## 2020-08-26 DIAGNOSIS — C49.A2 GASTROINTESTINAL STROMAL TUMOR (GIST) OF STOMACH: Primary | ICD-10-CM

## 2020-09-01 ENCOUNTER — LAB VISIT (OUTPATIENT)
Dept: LAB | Facility: HOSPITAL | Age: 65
End: 2020-09-01
Attending: NURSE PRACTITIONER
Payer: MEDICARE

## 2020-09-01 DIAGNOSIS — C49.A2 GASTROINTESTINAL STROMAL TUMOR (GIST) OF STOMACH: ICD-10-CM

## 2020-09-01 LAB
CREAT SERPL-MCNC: 1.3 MG/DL (ref 0.5–1.4)
EST. GFR  (AFRICAN AMERICAN): 49.1 ML/MIN/1.73 M^2
EST. GFR  (NON AFRICAN AMERICAN): 42.6 ML/MIN/1.73 M^2

## 2020-09-01 PROCEDURE — 82565 ASSAY OF CREATININE: CPT

## 2020-09-01 PROCEDURE — 36415 COLL VENOUS BLD VENIPUNCTURE: CPT

## 2020-09-02 ENCOUNTER — TELEPHONE (OUTPATIENT)
Dept: HEMATOLOGY/ONCOLOGY | Facility: CLINIC | Age: 65
End: 2020-09-02

## 2020-09-02 ENCOUNTER — HOSPITAL ENCOUNTER (OUTPATIENT)
Dept: RADIOLOGY | Facility: HOSPITAL | Age: 65
Discharge: HOME | End: 2020-09-02
Attending: NURSE PRACTITIONER
Payer: MEDICARE

## 2020-09-02 DIAGNOSIS — C49.A2 GASTROINTESTINAL STROMAL TUMOR (GIST) OF STOMACH: ICD-10-CM

## 2020-09-02 PROCEDURE — 25500020 PHARM REV CODE 255: Performed by: NURSE PRACTITIONER

## 2020-09-02 PROCEDURE — 74177 CT ABD & PELVIS W/CONTRAST: CPT | Mod: TC

## 2020-09-02 RX ADMIN — IOHEXOL 100 ML: 350 INJECTION, SOLUTION INTRAVENOUS at 09:09

## 2020-09-02 NOTE — TELEPHONE ENCOUNTER
----- Message from Tenisha Mclain sent at 9/2/2020  2:03 PM CDT -----  The patient's  was calling you back. Please return his call.

## 2020-09-04 ENCOUNTER — TELEPHONE (OUTPATIENT)
Dept: HEMATOLOGY/ONCOLOGY | Facility: CLINIC | Age: 65
End: 2020-09-04

## 2020-09-04 NOTE — TELEPHONE ENCOUNTER
----- Message from XANDER Matthews sent at 9/2/2020 10:19 AM CDT -----  Will you call the patient with her results

## 2020-09-04 NOTE — TELEPHONE ENCOUNTER
Called the patient and spoke with her  Geraldo.  I told him that I was calling about her CT results.  He stated that the doctor already called him.

## 2020-10-06 DIAGNOSIS — M54.2 CERVICALGIA: ICD-10-CM

## 2020-10-06 DIAGNOSIS — M54.50 LOW BACK PAIN: Primary | ICD-10-CM

## 2020-10-26 ENCOUNTER — HOSPITAL ENCOUNTER (OUTPATIENT)
Dept: RADIOLOGY | Facility: HOSPITAL | Age: 65
Discharge: HOME | End: 2020-10-26
Attending: NURSE PRACTITIONER
Payer: MEDICARE

## 2020-10-26 DIAGNOSIS — M54.2 CERVICALGIA: ICD-10-CM

## 2020-10-26 DIAGNOSIS — M54.50 LOW BACK PAIN: ICD-10-CM

## 2020-10-26 PROCEDURE — 72050 X-RAY EXAM NECK SPINE 4/5VWS: CPT | Mod: TC,PO

## 2020-10-26 PROCEDURE — 72110 X-RAY EXAM L-2 SPINE 4/>VWS: CPT | Mod: TC,PO

## 2021-01-21 ENCOUNTER — OFFICE VISIT (OUTPATIENT)
Dept: HEMATOLOGY/ONCOLOGY | Facility: CLINIC | Age: 66
End: 2021-01-21
Payer: MEDICARE

## 2021-01-21 VITALS
SYSTOLIC BLOOD PRESSURE: 133 MMHG | RESPIRATION RATE: 18 BRPM | DIASTOLIC BLOOD PRESSURE: 81 MMHG | BODY MASS INDEX: 25.15 KG/M2 | HEART RATE: 74 BPM | WEIGHT: 142 LBS

## 2021-01-21 DIAGNOSIS — K31.89 MASS OF STOMACH: ICD-10-CM

## 2021-01-21 DIAGNOSIS — C49.A2 GASTROINTESTINAL STROMAL TUMOR (GIST) OF STOMACH: Primary | ICD-10-CM

## 2021-01-21 DIAGNOSIS — R19.8 ABNORMAL FINDINGS ON ESOPHAGOGASTRODUODENOSCOPY (EGD): ICD-10-CM

## 2021-01-21 DIAGNOSIS — R93.3 ABNORMAL COMPUTED TOMOGRAPHY OF STOMACH: ICD-10-CM

## 2021-01-21 PROCEDURE — 99214 PR OFFICE/OUTPT VISIT, EST, LEVL IV, 30-39 MIN: ICD-10-PCS | Mod: S$GLB,,, | Performed by: INTERNAL MEDICINE

## 2021-01-21 PROCEDURE — 99214 OFFICE O/P EST MOD 30 MIN: CPT | Mod: S$GLB,,, | Performed by: INTERNAL MEDICINE

## 2021-07-15 DIAGNOSIS — I25.10 CORONARY ARTERY DISEASE INVOLVING NATIVE CORONARY ARTERY WITHOUT ANGINA PECTORIS: Primary | ICD-10-CM

## 2021-07-28 ENCOUNTER — HOSPITAL ENCOUNTER (OUTPATIENT)
Dept: RADIOLOGY | Facility: HOSPITAL | Age: 66
Discharge: HOME | End: 2021-07-28
Attending: SPECIALIST
Payer: MEDICARE

## 2021-07-28 DIAGNOSIS — I25.10 CORONARY ARTERY DISEASE INVOLVING NATIVE CORONARY ARTERY WITHOUT ANGINA PECTORIS: ICD-10-CM

## 2021-07-28 PROCEDURE — 76705 ECHO EXAM OF ABDOMEN: CPT | Mod: TC,PO

## 2021-07-29 ENCOUNTER — OFFICE VISIT (OUTPATIENT)
Dept: HEMATOLOGY/ONCOLOGY | Facility: CLINIC | Age: 66
End: 2021-07-29
Payer: MEDICARE

## 2021-07-29 VITALS
DIASTOLIC BLOOD PRESSURE: 77 MMHG | HEIGHT: 63 IN | SYSTOLIC BLOOD PRESSURE: 169 MMHG | RESPIRATION RATE: 18 BRPM | BODY MASS INDEX: 25.52 KG/M2 | HEART RATE: 69 BPM | WEIGHT: 144 LBS

## 2021-07-29 DIAGNOSIS — R19.8 ABNORMAL FINDINGS ON ESOPHAGOGASTRODUODENOSCOPY (EGD): ICD-10-CM

## 2021-07-29 DIAGNOSIS — R93.3 ABNORMAL COMPUTED TOMOGRAPHY OF STOMACH: ICD-10-CM

## 2021-07-29 DIAGNOSIS — K31.89 MASS OF STOMACH: ICD-10-CM

## 2021-07-29 DIAGNOSIS — C49.A2 GASTROINTESTINAL STROMAL TUMOR (GIST) OF STOMACH: Primary | ICD-10-CM

## 2021-07-29 PROCEDURE — 99214 OFFICE O/P EST MOD 30 MIN: CPT | Mod: S$GLB,,, | Performed by: INTERNAL MEDICINE

## 2021-07-29 PROCEDURE — 99214 PR OFFICE/OUTPT VISIT, EST, LEVL IV, 30-39 MIN: ICD-10-PCS | Mod: S$GLB,,, | Performed by: INTERNAL MEDICINE

## 2021-08-06 ENCOUNTER — HOSPITAL ENCOUNTER (OUTPATIENT)
Dept: PREADMISSION TESTING | Facility: HOSPITAL | Age: 66
Discharge: HOME | End: 2021-08-06
Attending: INTERNAL MEDICINE
Payer: MEDICARE

## 2021-08-06 VITALS
RESPIRATION RATE: 18 BRPM | TEMPERATURE: 97 F | WEIGHT: 143.31 LBS | HEART RATE: 60 BPM | DIASTOLIC BLOOD PRESSURE: 90 MMHG | BODY MASS INDEX: 25.39 KG/M2 | OXYGEN SATURATION: 97 % | HEIGHT: 63 IN | SYSTOLIC BLOOD PRESSURE: 174 MMHG

## 2021-08-06 DIAGNOSIS — Z01.818 PRE-OP TESTING: Primary | ICD-10-CM

## 2021-08-06 PROCEDURE — 93005 ELECTROCARDIOGRAM TRACING: CPT | Performed by: INTERNAL MEDICINE

## 2021-08-06 PROCEDURE — 93010 ELECTROCARDIOGRAM REPORT: CPT | Mod: ,,, | Performed by: INTERNAL MEDICINE

## 2021-08-06 PROCEDURE — 93010 EKG 12-LEAD: ICD-10-PCS | Mod: ,,, | Performed by: INTERNAL MEDICINE

## 2021-08-06 RX ORDER — METOPROLOL SUCCINATE 25 MG/1
50 TABLET, EXTENDED RELEASE ORAL NIGHTLY
COMMUNITY

## 2021-08-06 RX ORDER — ASPIRIN 81 MG/1
81 TABLET ORAL DAILY
COMMUNITY

## 2021-08-06 RX ORDER — PANTOPRAZOLE SODIUM 40 MG/1
40 TABLET, DELAYED RELEASE ORAL DAILY
COMMUNITY
End: 2024-02-27

## 2021-08-06 RX ORDER — ACETAMINOPHEN 500 MG
1 TABLET ORAL DAILY
COMMUNITY

## 2021-08-09 ENCOUNTER — HOSPITAL ENCOUNTER (OUTPATIENT)
Dept: PREADMISSION TESTING | Facility: HOSPITAL | Age: 66
Discharge: HOME | End: 2021-08-09
Attending: INTERNAL MEDICINE
Payer: MEDICARE

## 2021-08-09 DIAGNOSIS — Z01.818 PRE-OP TESTING: Primary | ICD-10-CM

## 2021-08-09 LAB — SARS-COV-2 RDRP RESP QL NAA+PROBE: NEGATIVE

## 2021-08-09 PROCEDURE — U0002 COVID-19 LAB TEST NON-CDC: HCPCS | Performed by: INTERNAL MEDICINE

## 2021-08-10 ENCOUNTER — ANESTHESIA (OUTPATIENT)
Dept: SURGERY | Facility: HOSPITAL | Age: 66
End: 2021-08-10
Payer: MEDICARE

## 2021-08-10 ENCOUNTER — ANESTHESIA EVENT (OUTPATIENT)
Dept: SURGERY | Facility: HOSPITAL | Age: 66
End: 2021-08-10
Payer: MEDICARE

## 2021-08-10 ENCOUNTER — HOSPITAL ENCOUNTER (OUTPATIENT)
Facility: HOSPITAL | Age: 66
Discharge: HOME | End: 2021-08-10
Attending: INTERNAL MEDICINE | Admitting: INTERNAL MEDICINE
Payer: MEDICARE

## 2021-08-10 VITALS
SYSTOLIC BLOOD PRESSURE: 178 MMHG | TEMPERATURE: 98 F | OXYGEN SATURATION: 98 % | HEART RATE: 57 BPM | RESPIRATION RATE: 18 BRPM | DIASTOLIC BLOOD PRESSURE: 68 MMHG

## 2021-08-10 DIAGNOSIS — K83.9 DISEASE OF BILIARY TRACT: ICD-10-CM

## 2021-08-10 LAB
BASOPHILS # BLD AUTO: 0.05 K/UL (ref 0–0.2)
BASOPHILS NFR BLD: 0.9 % (ref 0–1.9)
DIFFERENTIAL METHOD: ABNORMAL
EOSINOPHIL # BLD AUTO: 0.1 K/UL (ref 0–0.5)
EOSINOPHIL NFR BLD: 2.6 % (ref 0–8)
ERYTHROCYTE [DISTWIDTH] IN BLOOD BY AUTOMATED COUNT: 12.7 % (ref 11.5–14.5)
HCT VFR BLD AUTO: 40.9 % (ref 37–48.5)
HGB BLD-MCNC: 13.3 G/DL (ref 12–16)
IMM GRANULOCYTES # BLD AUTO: 0.02 K/UL (ref 0–0.04)
IMM GRANULOCYTES NFR BLD AUTO: 0.4 % (ref 0–0.5)
LYMPHOCYTES # BLD AUTO: 1.7 K/UL (ref 1–4.8)
LYMPHOCYTES NFR BLD: 31.6 % (ref 18–48)
MCH RBC QN AUTO: 31.2 PG (ref 27–31)
MCHC RBC AUTO-ENTMCNC: 32.5 G/DL (ref 32–36)
MCV RBC AUTO: 96 FL (ref 82–98)
MONOCYTES # BLD AUTO: 0.4 K/UL (ref 0.3–1)
MONOCYTES NFR BLD: 7.6 % (ref 4–15)
NEUTROPHILS # BLD AUTO: 3.1 K/UL (ref 1.8–7.7)
NEUTROPHILS NFR BLD: 56.9 % (ref 38–73)
NRBC BLD-RTO: 0 /100 WBC
PLATELET # BLD AUTO: 222 K/UL (ref 150–450)
PMV BLD AUTO: 10.6 FL (ref 9.2–12.9)
RBC # BLD AUTO: 4.26 M/UL (ref 4–5.4)
WBC # BLD AUTO: 5.38 K/UL (ref 3.9–12.7)

## 2021-08-10 PROCEDURE — 37000008 HC ANESTHESIA 1ST 15 MINUTES: Performed by: INTERNAL MEDICINE

## 2021-08-10 PROCEDURE — 43239 EGD BIOPSY SINGLE/MULTIPLE: CPT | Performed by: INTERNAL MEDICINE

## 2021-08-10 PROCEDURE — 27200043 HC FORCEPS, BIOPSY: Performed by: INTERNAL MEDICINE

## 2021-08-10 PROCEDURE — 43237 ENDOSCOPIC US EXAM ESOPH: CPT | Performed by: INTERNAL MEDICINE

## 2021-08-10 PROCEDURE — 85025 COMPLETE CBC W/AUTO DIFF WBC: CPT | Performed by: ANESTHESIOLOGY

## 2021-08-10 PROCEDURE — 63600175 PHARM REV CODE 636 W HCPCS: Performed by: NURSE ANESTHETIST, CERTIFIED REGISTERED

## 2021-08-10 PROCEDURE — 88305 TISSUE EXAM BY PATHOLOGIST: CPT | Mod: TC

## 2021-08-10 PROCEDURE — 37000009 HC ANESTHESIA EA ADD 15 MINS: Performed by: INTERNAL MEDICINE

## 2021-08-10 PROCEDURE — 25000003 PHARM REV CODE 250: Performed by: NURSE ANESTHETIST, CERTIFIED REGISTERED

## 2021-08-10 RX ORDER — PROPOFOL 10 MG/ML
VIAL (ML) INTRAVENOUS
Status: DISCONTINUED | OUTPATIENT
Start: 2021-08-10 | End: 2021-08-10

## 2021-08-10 RX ORDER — SODIUM CHLORIDE 9 MG/ML
INJECTION, SOLUTION INTRAVENOUS CONTINUOUS
Status: DISCONTINUED | OUTPATIENT
Start: 2021-08-10 | End: 2021-08-10 | Stop reason: HOSPADM

## 2021-08-10 RX ORDER — SODIUM CHLORIDE 0.9 % (FLUSH) 0.9 %
2 SYRINGE (ML) INJECTION
Status: DISCONTINUED | OUTPATIENT
Start: 2021-08-10 | End: 2021-08-10 | Stop reason: HOSPADM

## 2021-08-10 RX ADMIN — PROPOFOL 50 MG: 10 INJECTION, EMULSION INTRAVENOUS at 11:08

## 2021-08-10 RX ADMIN — SODIUM CHLORIDE: 0.9 INJECTION, SOLUTION INTRAVENOUS at 11:08

## 2021-08-10 RX ADMIN — PROPOFOL 100 MG: 10 INJECTION, EMULSION INTRAVENOUS at 11:08

## 2021-09-07 ENCOUNTER — HOSPITAL ENCOUNTER (OUTPATIENT)
Dept: RADIOLOGY | Facility: HOSPITAL | Age: 66
Discharge: HOME | End: 2021-09-07
Attending: INTERNAL MEDICINE
Payer: MEDICARE

## 2021-09-07 DIAGNOSIS — R93.3 ABNORMAL COMPUTED TOMOGRAPHY OF STOMACH: ICD-10-CM

## 2021-09-07 DIAGNOSIS — C49.A2 GASTROINTESTINAL STROMAL TUMOR (GIST) OF STOMACH: ICD-10-CM

## 2021-09-07 DIAGNOSIS — K31.89 MASS OF STOMACH: ICD-10-CM

## 2021-09-07 DIAGNOSIS — R19.8 ABNORMAL FINDINGS ON ESOPHAGOGASTRODUODENOSCOPY (EGD): ICD-10-CM

## 2021-09-07 LAB
CREAT SERPL-MCNC: 1 MG/DL (ref 0.5–1.4)
SAMPLE: NORMAL

## 2021-09-07 PROCEDURE — 82565 ASSAY OF CREATININE: CPT | Mod: PO

## 2021-09-07 PROCEDURE — 25500020 PHARM REV CODE 255: Mod: PO | Performed by: INTERNAL MEDICINE

## 2021-09-07 RX ADMIN — IOHEXOL 100 ML: 350 INJECTION, SOLUTION INTRAVENOUS at 09:09

## 2021-12-13 ENCOUNTER — OFFICE VISIT (OUTPATIENT)
Dept: URGENT CARE | Facility: CLINIC | Age: 66
End: 2021-12-13
Payer: MEDICARE

## 2021-12-13 VITALS
RESPIRATION RATE: 14 BRPM | TEMPERATURE: 98 F | WEIGHT: 144 LBS | SYSTOLIC BLOOD PRESSURE: 159 MMHG | BODY MASS INDEX: 25.52 KG/M2 | HEART RATE: 63 BPM | HEIGHT: 63 IN | DIASTOLIC BLOOD PRESSURE: 82 MMHG | OXYGEN SATURATION: 99 %

## 2021-12-13 DIAGNOSIS — M54.9 BACK PAIN, UNSPECIFIED BACK LOCATION, UNSPECIFIED BACK PAIN LATERALITY, UNSPECIFIED CHRONICITY: ICD-10-CM

## 2021-12-13 DIAGNOSIS — R10.9 ABDOMINAL PAIN, UNSPECIFIED ABDOMINAL LOCATION: Primary | ICD-10-CM

## 2021-12-13 LAB
BILIRUB UR QL STRIP: NEGATIVE
GLUCOSE UR QL STRIP: NEGATIVE
KETONES UR QL STRIP: NEGATIVE
LEUKOCYTE ESTERASE UR QL STRIP: NEGATIVE
PH, POC UA: 5
POC BLOOD, URINE: NEGATIVE
POC NITRATES, URINE: NEGATIVE
PROT UR QL STRIP: NEGATIVE
SP GR UR STRIP: 1.02 (ref 1–1.03)
UROBILINOGEN UR STRIP-ACNC: NORMAL (ref 0.1–1.1)

## 2021-12-13 PROCEDURE — 81003 POCT URINALYSIS, DIPSTICK, AUTOMATED, W/O SCOPE: ICD-10-PCS | Mod: QW,S$GLB,, | Performed by: NURSE PRACTITIONER

## 2021-12-13 PROCEDURE — 99203 OFFICE O/P NEW LOW 30 MIN: CPT | Mod: S$GLB,,, | Performed by: NURSE PRACTITIONER

## 2021-12-13 PROCEDURE — 99203 PR OFFICE/OUTPT VISIT, NEW, LEVL III, 30-44 MIN: ICD-10-PCS | Mod: S$GLB,,, | Performed by: NURSE PRACTITIONER

## 2021-12-13 PROCEDURE — 81003 URINALYSIS AUTO W/O SCOPE: CPT | Mod: QW,S$GLB,, | Performed by: NURSE PRACTITIONER

## 2022-01-11 ENCOUNTER — LAB VISIT (OUTPATIENT)
Dept: PRIMARY CARE CLINIC | Facility: OTHER | Age: 67
End: 2022-01-11
Attending: INTERNAL MEDICINE
Payer: MEDICARE

## 2022-01-11 DIAGNOSIS — Z20.822 ENCOUNTER FOR LABORATORY TESTING FOR COVID-19 VIRUS: ICD-10-CM

## 2022-01-11 PROCEDURE — U0003 INFECTIOUS AGENT DETECTION BY NUCLEIC ACID (DNA OR RNA); SEVERE ACUTE RESPIRATORY SYNDROME CORONAVIRUS 2 (SARS-COV-2) (CORONAVIRUS DISEASE [COVID-19]), AMPLIFIED PROBE TECHNIQUE, MAKING USE OF HIGH THROUGHPUT TECHNOLOGIES AS DESCRIBED BY CMS-2020-01-R: HCPCS | Performed by: INTERNAL MEDICINE

## 2022-01-12 LAB — SARS-COV-2- CYCLE NUMBER: 34

## 2022-01-13 DIAGNOSIS — U07.1 COVID-19 VIRUS DETECTED: ICD-10-CM

## 2022-01-13 LAB — SARS-COV-2 RNA RESP QL NAA+PROBE: DETECTED

## 2022-01-25 ENCOUNTER — LAB VISIT (OUTPATIENT)
Dept: LAB | Facility: HOSPITAL | Age: 67
End: 2022-01-25
Attending: INTERNAL MEDICINE
Payer: COMMERCIAL

## 2022-01-25 DIAGNOSIS — C49.A2 GASTROINTESTINAL STROMAL TUMOR (GIST) OF STOMACH: ICD-10-CM

## 2022-01-25 DIAGNOSIS — K31.89 MASS OF STOMACH: ICD-10-CM

## 2022-01-25 DIAGNOSIS — R19.8 ABNORMAL FINDINGS ON ESOPHAGOGASTRODUODENOSCOPY (EGD): ICD-10-CM

## 2022-01-25 DIAGNOSIS — R93.3 ABNORMAL COMPUTED TOMOGRAPHY OF STOMACH: ICD-10-CM

## 2022-01-25 LAB
CREAT SERPL-MCNC: 1.1 MG/DL (ref 0.5–1.4)
EST. GFR  (AFRICAN AMERICAN): 59.6 ML/MIN/1.73 M^2
EST. GFR  (NON AFRICAN AMERICAN): 51.7 ML/MIN/1.73 M^2

## 2022-01-25 PROCEDURE — 82565 ASSAY OF CREATININE: CPT | Performed by: INTERNAL MEDICINE

## 2022-01-25 PROCEDURE — 36415 COLL VENOUS BLD VENIPUNCTURE: CPT | Performed by: INTERNAL MEDICINE

## 2022-01-26 ENCOUNTER — TELEPHONE (OUTPATIENT)
Dept: HEMATOLOGY/ONCOLOGY | Facility: CLINIC | Age: 67
End: 2022-01-26
Payer: COMMERCIAL

## 2022-01-26 ENCOUNTER — LAB VISIT (OUTPATIENT)
Dept: LAB | Facility: HOSPITAL | Age: 67
End: 2022-01-26
Attending: INTERNAL MEDICINE
Payer: COMMERCIAL

## 2022-01-26 DIAGNOSIS — C49.A2 GASTROINTESTINAL STROMAL TUMOR (GIST) OF STOMACH: ICD-10-CM

## 2022-01-26 DIAGNOSIS — C49.A2 GASTROINTESTINAL STROMAL TUMOR (GIST) OF STOMACH: Primary | ICD-10-CM

## 2022-01-26 LAB
ALBUMIN SERPL BCP-MCNC: 4.2 G/DL (ref 3.5–5.2)
ALP SERPL-CCNC: 86 U/L (ref 55–135)
ALT SERPL W/O P-5'-P-CCNC: 21 U/L (ref 10–44)
ANION GAP SERPL CALC-SCNC: 11 MMOL/L (ref 8–16)
AST SERPL-CCNC: 20 U/L (ref 10–40)
BASOPHILS # BLD AUTO: 0.04 K/UL (ref 0–0.2)
BASOPHILS NFR BLD: 0.7 % (ref 0–1.9)
BILIRUB SERPL-MCNC: 0.8 MG/DL (ref 0.1–1)
BUN SERPL-MCNC: 14 MG/DL (ref 8–23)
CALCIUM SERPL-MCNC: 9.7 MG/DL (ref 8.7–10.5)
CHLORIDE SERPL-SCNC: 104 MMOL/L (ref 95–110)
CO2 SERPL-SCNC: 27 MMOL/L (ref 23–29)
CREAT SERPL-MCNC: 1.1 MG/DL (ref 0.5–1.4)
DIFFERENTIAL METHOD: ABNORMAL
EOSINOPHIL # BLD AUTO: 0.1 K/UL (ref 0–0.5)
EOSINOPHIL NFR BLD: 1 % (ref 0–8)
ERYTHROCYTE [DISTWIDTH] IN BLOOD BY AUTOMATED COUNT: 12.6 % (ref 11.5–14.5)
EST. GFR  (AFRICAN AMERICAN): 59.6 ML/MIN/1.73 M^2
EST. GFR  (NON AFRICAN AMERICAN): 51.7 ML/MIN/1.73 M^2
GLUCOSE SERPL-MCNC: 80 MG/DL (ref 70–110)
HCT VFR BLD AUTO: 42.3 % (ref 37–48.5)
HGB BLD-MCNC: 13.7 G/DL (ref 12–16)
IMM GRANULOCYTES # BLD AUTO: 0.02 K/UL (ref 0–0.04)
IMM GRANULOCYTES NFR BLD AUTO: 0.3 % (ref 0–0.5)
LYMPHOCYTES # BLD AUTO: 2.2 K/UL (ref 1–4.8)
LYMPHOCYTES NFR BLD: 38.6 % (ref 18–48)
MCH RBC QN AUTO: 31.1 PG (ref 27–31)
MCHC RBC AUTO-ENTMCNC: 32.4 G/DL (ref 32–36)
MCV RBC AUTO: 96 FL (ref 82–98)
MONOCYTES # BLD AUTO: 0.4 K/UL (ref 0.3–1)
MONOCYTES NFR BLD: 7.3 % (ref 4–15)
NEUTROPHILS # BLD AUTO: 3 K/UL (ref 1.8–7.7)
NEUTROPHILS NFR BLD: 52.1 % (ref 38–73)
NRBC BLD-RTO: 0 /100 WBC
PLATELET # BLD AUTO: 254 K/UL (ref 150–450)
PMV BLD AUTO: 10.1 FL (ref 9.2–12.9)
POTASSIUM SERPL-SCNC: 4.3 MMOL/L (ref 3.5–5.1)
PROT SERPL-MCNC: 7.4 G/DL (ref 6–8.4)
RBC # BLD AUTO: 4.41 M/UL (ref 4–5.4)
SODIUM SERPL-SCNC: 142 MMOL/L (ref 136–145)
WBC # BLD AUTO: 5.78 K/UL (ref 3.9–12.7)

## 2022-01-26 PROCEDURE — 80053 COMPREHEN METABOLIC PANEL: CPT | Performed by: INTERNAL MEDICINE

## 2022-01-26 PROCEDURE — 36415 COLL VENOUS BLD VENIPUNCTURE: CPT | Performed by: INTERNAL MEDICINE

## 2022-01-26 PROCEDURE — 85025 COMPLETE CBC W/AUTO DIFF WBC: CPT | Performed by: INTERNAL MEDICINE

## 2022-01-26 NOTE — PROGRESS NOTES
[facility] Hematology/Oncology  PROGRESS NOTE -  Follow-up Visit      Subjective:       Patient ID:   NAME: Lorraine Warner : 1955     66 y.o. female    Referring Doc: Judith  Other Physicians: Radha Kumar    Chief Complaint:  gastric submucosal nodule/GIST f/u        History of Present Illness:     Patient returns today for a  regularly scheduled follow-up visit.  The patient is here today to go over the results of the recently ordered labs, tests and studies. She is here with her .     She previously had seen Dr Garcia with Gen Surgery and had subsequent resection of the mass on 2018.   She is doing ok with no new issues.   She denies any CP, SOB, HA's or N/V.     She has residual reflux but better with prilosec; she has not followed up with Dr Martin with GI recently but had scan in 2021         Discussed covid19 precautions - she has not been vaccinated; she had covid in Dec 2021 and recovered      ROS:   GEN: normal without any fever, night sweats or weight loss  HEENT: normal with no HA's, sore throat, stiff neck, changes in vision  CV: normal with no CP, SOB, PND, MARSHALL or orthopnea  PULM: normal with no SOB, cough, hemoptysis, sputum or pleuritic pain  GI: normal with no abdominal pain, nausea, vomiting, constipation, diarrhea, melanotic stools, BRBPR, or hematemesis; some reflux  : normal with no hematuria, dysuria  BREAST: normal with no mass, discharge, pain  SKIN: normal with no rash, erythema, bruising, or swelling    Allergies:  Review of patient's allergies indicates:  No Known Allergies    Medications:    Current Outpatient Medications:     aspirin (ECOTRIN) 81 MG EC tablet, Take 81 mg by mouth once daily., Disp: , Rfl:     atorvastatin (LIPITOR) 40 MG tablet, Take 40 mg by mouth every evening. , Disp: , Rfl:     cholecalciferol, vitamin D3, (VITAMIN D3) 50 mcg (2,000 unit) Cap, Take 1 capsule by mouth once daily., Disp: , Rfl:     metoprolol succinate  "(TOPROL-XL) 25 MG 24 hr tablet, Take 25 mg by mouth every evening. , Disp: , Rfl:     pantoprazole (PROTONIX) 40 MG tablet, Take 40 mg by mouth once daily., Disp: , Rfl:     potassium chloride (MICRO-K) 10 MEQ CpSR, Take 10 mEq by mouth every morning. , Disp: , Rfl:     PMHx/PSHx Updates:  See patient's last visit with me on 1/21/2021  See H&P on 10/25/2018        Pathology:  Cancer Staging      Gastric Wedge Resection: 11/27/2018:    FINAL DIAGNOSIS:  POSTERIOR STOMACH, WEDGE RESECTION:   GASTROINTESTINAL STROMAL TUMOR (GIST), MIXED SPINDLE CELL AND  EPITHELIOID TYPE.    2.5 CM IN GREATEST DIMENSIONS.    THE SHAVED SURGICAL MARGIN OF RESECTION IS FREE OF TUMOR.    THE TUMOR FOCALLY EXTENDS TO THE SEROSAL SURFACE.  pT2NX, (Stage IA).  G1: Low Grade with mitotic rate less than or equal to 5/5mm2  KIT ():     Positive.   DOG-1:     Positive    RISK ASSESSMENT:   Very low risk (1.9%), Based on the strict criteria of mitotic count  and size of tumor. A moderate risk cannot be entirely   excluded.    Objective:     Vitals:  Blood pressure 131/74, pulse 70, temperature 97.2 °F (36.2 °C), resp. rate 18, height 5' 3" (1.6 m), weight 64.9 kg (143 lb).    Physical Examination:   GEN: no apparent distress, comfortable; AAOx3  HEAD: atraumatic and normocephalic  EYES: no pallor, no icterus, PERRLA  ENT: OMM, no pharyngeal erythema, external ears WNL; no nasal discharge; no thrush  NECK: no masses, thyroid normal, trachea midline, no LAD/LN's, supple  CV: RRR with no murmur; normal pulse; normal S1 and S2; no pedal edema  CHEST: Normal respiratory effort; CTAB; normal breath sounds; no wheeze or crackles  ABDOM: nontender and nondistended; soft; normal bowel sounds; no rebound/guarding  MUSC/Skeletal: ROM normal; no crepitus; joints normal; no deformities or arthropathy  EXTREM: no clubbing, cyanosis, inflammation or swelling  SKIN: no rashes, lesions, ulcers, petechiae or subcutaneous nodules  : no amaro  NEURO: " grossly intact; motor/sensory WNL; AAOx3; no tremors  PSYCH: normal mood, affect and behavior  LYMPH: normal cervical, supraclavicular, axillary and groin LN's            Labs:      Lab Results   Component Value Date    WBC 5.78 01/26/2022    HGB 13.7 01/26/2022    HCT 42.3 01/26/2022    MCV 96 01/26/2022     01/26/2022     CMP  Sodium   Date Value Ref Range Status   01/26/2022 142 136 - 145 mmol/L Final   01/17/2019 138 134 - 144 mmol/L      Potassium   Date Value Ref Range Status   01/26/2022 4.3 3.5 - 5.1 mmol/L Final     Chloride   Date Value Ref Range Status   01/26/2022 104 95 - 110 mmol/L Final   01/17/2019 98 98 - 110 mmol/L      CO2   Date Value Ref Range Status   01/26/2022 27 23 - 29 mmol/L Final     Glucose   Date Value Ref Range Status   01/26/2022 80 70 - 110 mg/dL Final   01/17/2019 114 (H) 70 - 99 mg/dL      BUN   Date Value Ref Range Status   01/26/2022 14 8 - 23 mg/dL Final     Creatinine   Date Value Ref Range Status   01/26/2022 1.1 0.5 - 1.4 mg/dL Final   01/17/2019 1.08 0.60 - 1.40 mg/dL      Calcium   Date Value Ref Range Status   01/26/2022 9.7 8.7 - 10.5 mg/dL Final     Total Protein   Date Value Ref Range Status   01/26/2022 7.4 6.0 - 8.4 g/dL Final     Albumin   Date Value Ref Range Status   01/26/2022 4.2 3.5 - 5.2 g/dL Final   01/17/2019 4.2 3.1 - 4.7 g/dL      Total Bilirubin   Date Value Ref Range Status   01/26/2022 0.8 0.1 - 1.0 mg/dL Final     Comment:     For infants and newborns, interpretation of results should be based  on gestational age, weight and in agreement with clinical  observations.    Premature Infant recommended reference ranges:  Up to 24 hours.............<8.0 mg/dL  Up to 48 hours............<12.0 mg/dL  3-5 days..................<15.0 mg/dL  6-29 days.................<15.0 mg/dL       Alkaline Phosphatase   Date Value Ref Range Status   01/26/2022 86 55 - 135 U/L Final     AST   Date Value Ref Range Status   01/26/2022 20 10 - 40 U/L Final     ALT   Date  Value Ref Range Status   01/26/2022 21 10 - 44 U/L Final     Anion Gap   Date Value Ref Range Status   01/26/2022 11 8 - 16 mmol/L Final     eGFR if    Date Value Ref Range Status   01/26/2022 59.6 (A) >60 mL/min/1.73 m^2 Final     eGFR if non    Date Value Ref Range Status   01/26/2022 51.7 (A) >60 mL/min/1.73 m^2 Final     Comment:     Calculation used to obtain the estimated glomerular filtration  rate (eGFR) is the CKD-EPI equation.                Radiology/Diagnostic Studies:      CT abdom/pelvis  9/7/2021:      IMPRESSION:  1. Postoperative changes of prior GIST tumor resection of the cardia of the stomach, with no lymphadenopathy or CT finding of residual/recurrent disease.  2. No acute abdominal or pelvic abnormality.          US  abdom  7/28/2021:  IMPRESSION:  Dilated common duct reaching diameter of 8 mm. Further evaluation with CT abdomen and pelvis with IV contrast and MRCP are recommended.         CT abdom/pelvis  9/2/2020:    IMPRESSION:     1.  Status post resection of gastric GIST tumor. No recurrent tumor  or regional soft tissue abnormality identified.  2.  No other acute changes when compared previous exam.         PET  7/29/2019:  Impression       No FDG PET/CT findings to suggest recurrent or metastatic disease.             Chest CT 4/24/2019    IMPRESSION:  1. No acute abnormality seen.  2. The 2 lung nodules seen on CT PET scan dated 01/29/2019 represent calcified  granulomas.  3. There are several small perifissural lymph nodes in the right lung measuring  5 mm or less in diameter.  4. There  is no evidence of lung metastasis.  5. No change in the sclerotic focus in the right scapula described on the  previous PET scan and increases the probability of it representing a benign  lesion.        PET 1/15/2019:  IMPRESSION:    1. No current convincing evidence for residual malignancy or metastatic disease.    2. Incidental findings tiny noncalcified left lung  "nodules and sclerotic focus  in right scapula. Benign etiologies are favored, although metastatic disease is  conceivable but felt less likely. CT thorax without IV contrast follow-up is  recommended in 3 months to begin to document prolonged stability if imaging  follow-up suggested for at least 2 years. If old outside CTs of the chest are  available to document prolonged stability and these are made available,  addendum can be issued at that time.    3. Focus of FDG uptake along deep aspect of umbilicus without CT correlate is  likely inflammatory in nature and related to recent surgery.        I have reviewed all available lab results and radiology reports.    Assessment/Plan:   (1) 66 y.o. female with diagnosis of a submucosal nodule of the gastric cardia who has been referred by Dr Yunior Wong with GI for evaluation by medical hematology/oncology.   - She had originally presented with abdominal pain and underwent EGD with Dr Wong on 10/19/2018.   - She had a CT scan on 10/11/2018 which showed a 2.7 x 1.6cm soft tissue mass in the cardia of the stomach.   - on EGD, he found a submucosal nodule in the cardia of the stomach.   - Pathology from the initial biopsy seems to have been nondiagnostic.   - she saw Dr Garcia with Gen Surg and underwent a wedge resection on 11/27/2018 with the pathology ultimately showing GIST  - G1: Low Grade; pT2NX, (Stage IA).  - upon review of the latest guidelines, she should not need any adjuvant therapy with the drug Gleevec due to the low-grade nature of her GIST  - "According to pathologist, her tumor was 2.5cm but was low grade with mitotic rate < or = to 5/50 hpf  - her risk assessment as a result is very low risk at 1.9% risk on metastasis"   - as a result, based on the latest NCCN guidelines (v. 1.2019) I would recommend surveillance only and she should not need Gleevec therapy at this time    - she had PEt on 7/29/2019 7/21/2020:  - She last saw Dr Wong about 4 " "months ago and had repeat scope which was "good" per patient   - she is due for repeat labs and scnas    1/21/2021:  - some residual reflux symptoms but she has not seen Dr Wong in over a year  - scans in Sept 2020 on chart and good  - repeat scans again in Sept 2021 7/29/2021:  - check up to date labs  - CT scheduled for Sept 7th 2021  - she needs to f/u with Dr Wong with GI for the reflux sx's  - recent US done with Dr mustafa with some dilation of the bile duct    1/27/2022:  - she had CT scan in Sept 2021 which were stable with no evidence of recurrent cancer  - she has some residual reflux which is controlled with prilosec  - she has not follow-up with Dr Wong recently and I encouraged her to do so      (2) HTN and hypercholesterolemia     (3) Hernia     (4) Chronic gastritis  -    (5) Low potassium     (6) S/p prior bladder drainage issue          VISIT DIAGNOSES:      Iron deficiency anemia due to chronic blood loss    Gastrointestinal stromal tumor (GIST) of stomach    Mass of stomach - cardia    Abnormal findings on esophagogastroduodenoscopy (EGD)    Abnormal computed tomography of stomach          PLAN:  1. F/u with GI for her chronic reflux  2. No indication for Gleevec at this time  3. F/u with PCP, GI, Gen Surg etc  4. RTC in  6 months and repeat scan in Sept 2022    Fax note to Judith Banks; Azeb Mustafa    Discussion:     Pathology Discussion:    I reviewed and discussed the pathology report(s) and radiograph reports (if available) in as simple to understand and/or laymen's terms to the best of my ability. I had an indepth conversation with the patient and went over the patient's individual diagnosis based on the information that was currently available. I discussed the TNM staging process with regard to the patient's particular cancer type, and the calculated stage based on the currently available TNM data and literature. I discussed the available prognostic data with regard " "to the current staging information and how it relates to the prognosis of their particular neoplastic process.          NCCN Guidelines:    I discussed the available treatment option(s) in accordance with the latest literature from the NCCN Clinical Practice Guidelines for the patient's particular type of cancer disorder. The NCCN Guidelines provide a "document evidence-based (and) consensus-driven management" of the care of oncology patients. The treatment recommendations were made not only in accordance to the NCCN guidelines, but also factored in to account the patient's overall age, condition, performance status and their medical co-morbidities. I went over the risks and benefits of the the treatment options (if any could be made) with regard to their particular cancer type, their cancer stage, their age, and their co-morbidities.         (the NCCN guidelines are included on the chart)    COVID-19 Discussion:    I had long discussion with patient and any applicable family about the COVID-19 coronavirus epidemic and the recommended precautions with regard to cancer and/or hematology patients. I have re-iterated the CDC recommendations for adequate hand washing, use of hand -like products, and coughing into elbow, etc. In addition, especially for our patients who are on chemotherapy and/or our otherwise immunocompromised patients, I have recommended avoidance of crowds, including movie theaters, restaurants, churches, etc. I have recommended avoidance of any sick or symptomatic family members and/or friends. I have also recommended avoidance of any raw and unwashed food products, and general avoidance of food items that have not been prepared by themselves. The patient has been asked to call us immediately with any symptom developments, issues, questions or other general concerns.       I spent over 25 mins of time with the patient. Reviewed results of the recently ordered labs, tests and studies; made " directives with regards to the results. Over half of this time was spent couseling and coordinating care.    I have explained all of the above in detail and the patient understands all of the current recommendation(s). I have answered all of their questions to the best of my ability and to their complete satisfaction.   The patient is to continue with the current management plan.    Electronically signed by William Joe MD

## 2022-01-26 NOTE — TELEPHONE ENCOUNTER
----- Message from Venessa Denney, Patient Care Assistant sent at 1/26/2022 10:14 AM CST -----  Regarding: Labs  Patient called in stating she would like her lab orders sent next door to the [facility], so she can have them done today.  # [phone]

## 2022-01-27 ENCOUNTER — OFFICE VISIT (OUTPATIENT)
Dept: HEMATOLOGY/ONCOLOGY | Facility: CLINIC | Age: 67
End: 2022-01-27
Payer: COMMERCIAL

## 2022-01-27 VITALS
BODY MASS INDEX: 25.34 KG/M2 | RESPIRATION RATE: 18 BRPM | SYSTOLIC BLOOD PRESSURE: 131 MMHG | DIASTOLIC BLOOD PRESSURE: 74 MMHG | TEMPERATURE: 97 F | HEIGHT: 63 IN | HEART RATE: 70 BPM | WEIGHT: 143 LBS

## 2022-01-27 DIAGNOSIS — C49.A2 GASTROINTESTINAL STROMAL TUMOR (GIST) OF STOMACH: ICD-10-CM

## 2022-01-27 DIAGNOSIS — D50.0 IRON DEFICIENCY ANEMIA DUE TO CHRONIC BLOOD LOSS: Primary | ICD-10-CM

## 2022-01-27 DIAGNOSIS — K31.89 MASS OF STOMACH: ICD-10-CM

## 2022-01-27 DIAGNOSIS — R93.3 ABNORMAL COMPUTED TOMOGRAPHY OF STOMACH: ICD-10-CM

## 2022-01-27 DIAGNOSIS — R19.8 ABNORMAL FINDINGS ON ESOPHAGOGASTRODUODENOSCOPY (EGD): ICD-10-CM

## 2022-01-27 PROCEDURE — 99214 PR OFFICE/OUTPT VISIT, EST, LEVL IV, 30-39 MIN: ICD-10-PCS | Mod: S$GLB,,, | Performed by: INTERNAL MEDICINE

## 2022-01-27 PROCEDURE — 99214 OFFICE O/P EST MOD 30 MIN: CPT | Mod: S$GLB,,, | Performed by: INTERNAL MEDICINE

## 2022-01-27 PROCEDURE — 1159F PR MEDICATION LIST DOCUMENTED IN MEDICAL RECORD: ICD-10-PCS | Mod: S$GLB,,, | Performed by: INTERNAL MEDICINE

## 2022-01-27 PROCEDURE — 3078F PR MOST RECENT DIASTOLIC BLOOD PRESSURE < 80 MM HG: ICD-10-PCS | Mod: S$GLB,,, | Performed by: INTERNAL MEDICINE

## 2022-01-27 PROCEDURE — 1160F PR REVIEW ALL MEDS BY PRESCRIBER/CLIN PHARMACIST DOCUMENTED: ICD-10-PCS | Mod: S$GLB,,, | Performed by: INTERNAL MEDICINE

## 2022-01-27 PROCEDURE — 1159F MED LIST DOCD IN RCRD: CPT | Mod: S$GLB,,, | Performed by: INTERNAL MEDICINE

## 2022-01-27 PROCEDURE — 3008F BODY MASS INDEX DOCD: CPT | Mod: S$GLB,,, | Performed by: INTERNAL MEDICINE

## 2022-01-27 PROCEDURE — 1101F PR PT FALLS ASSESS DOC 0-1 FALLS W/OUT INJ PAST YR: ICD-10-PCS | Mod: S$GLB,,, | Performed by: INTERNAL MEDICINE

## 2022-01-27 PROCEDURE — 3288F PR FALLS RISK ASSESSMENT DOCUMENTED: ICD-10-PCS | Mod: S$GLB,,, | Performed by: INTERNAL MEDICINE

## 2022-01-27 PROCEDURE — 1126F PR PAIN SEVERITY QUANTIFIED, NO PAIN PRESENT: ICD-10-PCS | Mod: S$GLB,,, | Performed by: INTERNAL MEDICINE

## 2022-01-27 PROCEDURE — 3075F SYST BP GE 130 - 139MM HG: CPT | Mod: S$GLB,,, | Performed by: INTERNAL MEDICINE

## 2022-01-27 PROCEDURE — 1160F RVW MEDS BY RX/DR IN RCRD: CPT | Mod: S$GLB,,, | Performed by: INTERNAL MEDICINE

## 2022-01-27 PROCEDURE — 1101F PT FALLS ASSESS-DOCD LE1/YR: CPT | Mod: S$GLB,,, | Performed by: INTERNAL MEDICINE

## 2022-01-27 PROCEDURE — 3008F PR BODY MASS INDEX (BMI) DOCUMENTED: ICD-10-PCS | Mod: S$GLB,,, | Performed by: INTERNAL MEDICINE

## 2022-01-27 PROCEDURE — 3288F FALL RISK ASSESSMENT DOCD: CPT | Mod: S$GLB,,, | Performed by: INTERNAL MEDICINE

## 2022-01-27 PROCEDURE — 3078F DIAST BP <80 MM HG: CPT | Mod: S$GLB,,, | Performed by: INTERNAL MEDICINE

## 2022-01-27 PROCEDURE — 1126F AMNT PAIN NOTED NONE PRSNT: CPT | Mod: S$GLB,,, | Performed by: INTERNAL MEDICINE

## 2022-01-27 PROCEDURE — 3075F PR MOST RECENT SYSTOLIC BLOOD PRESS GE 130-139MM HG: ICD-10-PCS | Mod: S$GLB,,, | Performed by: INTERNAL MEDICINE

## 2022-02-02 DIAGNOSIS — Z12.31 ENCOUNTER FOR SCREENING MAMMOGRAM FOR MALIGNANT NEOPLASM OF BREAST: Primary | ICD-10-CM

## 2022-02-18 ENCOUNTER — HOSPITAL ENCOUNTER (OUTPATIENT)
Dept: RADIOLOGY | Facility: HOSPITAL | Age: 67
Discharge: HOME | End: 2022-02-18
Attending: NURSE PRACTITIONER
Payer: COMMERCIAL

## 2022-02-18 DIAGNOSIS — Z12.31 ENCOUNTER FOR SCREENING MAMMOGRAM FOR MALIGNANT NEOPLASM OF BREAST: ICD-10-CM

## 2022-02-18 PROCEDURE — 77063 BREAST TOMOSYNTHESIS BI: CPT | Mod: TC,PO

## 2022-02-18 PROCEDURE — 77067 SCR MAMMO BI INCL CAD: CPT | Mod: TC,PO

## 2022-03-09 ENCOUNTER — HOSPITAL ENCOUNTER (OUTPATIENT)
Dept: RADIOLOGY | Facility: HOSPITAL | Age: 67
Discharge: HOME | End: 2022-03-09
Attending: NURSE PRACTITIONER
Payer: COMMERCIAL

## 2022-03-09 DIAGNOSIS — R92.2 INCONCLUSIVE MAMMOGRAM: ICD-10-CM

## 2022-03-09 PROCEDURE — 76642 ULTRASOUND BREAST LIMITED: CPT | Mod: TC,PO,LT

## 2022-07-26 NOTE — PROGRESS NOTES
[facility] Hematology/Oncology  PROGRESS NOTE -  Follow-up Visit      Subjective:       Patient ID:   NAME: Lorraine Warner : 1955     67 y.o. female    Referring Doc: Judith  Other Physicians: Azeb Kumar    Chief Complaint:  gastric submucosal nodule/GIST f/u        History of Present Illness:     Patient returns today for a  regularly scheduled follow-up visit.  The patient is here today to go over the results of the recently ordered labs, tests and studies. She is here with her . present     She previously had seen Dr Garcia with Gen Surgery and had subsequent resection of the mass on 2018.       She is doing ok with no new issues.   She denies any CP, SOB, HA's or N/V.     She has residual reflux but is now much better with prilosec; She did not see Dr Wong as expected           Discussed covid19 precautions - she has not been vaccinated; she had covid in Dec 2021 and recovered      ROS:   GEN: normal without any fever, night sweats or weight loss  HEENT: normal with no HA's, sore throat, stiff neck, changes in vision  CV: normal with no CP, SOB, PND, MARSHALL or orthopnea  PULM: normal with no SOB, cough, hemoptysis, sputum or pleuritic pain  GI: normal with no abdominal pain, nausea, vomiting, constipation, diarrhea, melanotic stools, BRBPR, or hematemesis; some reflux but now better  : normal with no hematuria, dysuria  BREAST: normal with no mass, discharge, pain  SKIN: normal with no rash, erythema, bruising, or swelling    Allergies:  Review of patient's allergies indicates:  No Known Allergies    Medications:    Current Outpatient Medications:     aspirin (ECOTRIN) 81 MG EC tablet, Take 81 mg by mouth once daily., Disp: , Rfl:     atorvastatin (LIPITOR) 40 MG tablet, Take 40 mg by mouth every evening. , Disp: , Rfl:     cholecalciferol, vitamin D3, (VITAMIN D3) 50 mcg (2,000 unit) Cap, Take 1 capsule by mouth once daily., Disp: , Rfl:     metoprolol  "succinate (TOPROL-XL) 25 MG 24 hr tablet, Take 25 mg by mouth every evening. , Disp: , Rfl:     pantoprazole (PROTONIX) 40 MG tablet, Take 40 mg by mouth once daily., Disp: , Rfl:     potassium chloride (MICRO-K) 10 MEQ CpSR, Take 10 mEq by mouth every morning. , Disp: , Rfl:     PMHx/PSHx Updates:  See patient's last visit with me on 1/27/2022  See H&P on 10/25/2018        Pathology:  Cancer Staging      Gastric Wedge Resection: 11/27/2018:    FINAL DIAGNOSIS:  POSTERIOR STOMACH, WEDGE RESECTION:   GASTROINTESTINAL STROMAL TUMOR (GIST), MIXED SPINDLE CELL AND  EPITHELIOID TYPE.    2.5 CM IN GREATEST DIMENSIONS.    THE SHAVED SURGICAL MARGIN OF RESECTION IS FREE OF TUMOR.    THE TUMOR FOCALLY EXTENDS TO THE SEROSAL SURFACE.  pT2NX, (Stage IA).  G1: Low Grade with mitotic rate less than or equal to 5/5mm2  KIT ():     Positive.   DOG-1:     Positive    RISK ASSESSMENT:   Very low risk (1.9%), Based on the strict criteria of mitotic count  and size of tumor. A moderate risk cannot be entirely   excluded.    Objective:     Vitals:  Blood pressure 134/61, pulse 64, temperature 97.5 °F (36.4 °C), resp. rate 18, height 5' 3" (1.6 m), weight 66.9 kg (147 lb 6.4 oz).    Physical Examination:   GEN: no apparent distress, comfortable; AAOx3  HEAD: atraumatic and normocephalic  EYES: no pallor, no icterus, PERRLA  ENT: OMM, no pharyngeal erythema, external ears WNL; no nasal discharge; no thrush  NECK: no masses, thyroid normal, trachea midline, no LAD/LN's, supple  CV: RRR with no murmur; normal pulse; normal S1 and S2; no pedal edema  CHEST: Normal respiratory effort; CTAB; normal breath sounds; no wheeze or crackles  ABDOM: nontender and nondistended; soft; normal bowel sounds; no rebound/guarding  MUSC/Skeletal: ROM normal; no crepitus; joints normal; no deformities or arthropathy  EXTREM: no clubbing, cyanosis, inflammation or swelling  SKIN: no rashes, lesions, ulcers, petechiae or subcutaneous nodules  : no " amaro  NEURO: grossly intact; motor/sensory WNL; AAOx3; no tremors  PSYCH: normal mood, affect and behavior  LYMPH: normal cervical, supraclavicular, axillary and groin LN's            Labs:      Lab Results   Component Value Date    WBC 5.78 01/26/2022    HGB 13.7 01/26/2022    HCT 42.3 01/26/2022    MCV 96 01/26/2022     01/26/2022     CMP  Sodium   Date Value Ref Range Status   01/26/2022 142 136 - 145 mmol/L Final   01/17/2019 138 134 - 144 mmol/L      Potassium   Date Value Ref Range Status   01/26/2022 4.3 3.5 - 5.1 mmol/L Final     Chloride   Date Value Ref Range Status   01/26/2022 104 95 - 110 mmol/L Final   01/17/2019 98 98 - 110 mmol/L      CO2   Date Value Ref Range Status   01/26/2022 27 23 - 29 mmol/L Final     Glucose   Date Value Ref Range Status   01/26/2022 80 70 - 110 mg/dL Final   01/17/2019 114 (H) 70 - 99 mg/dL      BUN   Date Value Ref Range Status   01/26/2022 14 8 - 23 mg/dL Final     Creatinine   Date Value Ref Range Status   01/26/2022 1.1 0.5 - 1.4 mg/dL Final   01/17/2019 1.08 0.60 - 1.40 mg/dL      Calcium   Date Value Ref Range Status   01/26/2022 9.7 8.7 - 10.5 mg/dL Final     Total Protein   Date Value Ref Range Status   01/26/2022 7.4 6.0 - 8.4 g/dL Final     Albumin   Date Value Ref Range Status   01/26/2022 4.2 3.5 - 5.2 g/dL Final   01/17/2019 4.2 3.1 - 4.7 g/dL      Total Bilirubin   Date Value Ref Range Status   01/26/2022 0.8 0.1 - 1.0 mg/dL Final     Comment:     For infants and newborns, interpretation of results should be based  on gestational age, weight and in agreement with clinical  observations.    Premature Infant recommended reference ranges:  Up to 24 hours.............<8.0 mg/dL  Up to 48 hours............<12.0 mg/dL  3-5 days..................<15.0 mg/dL  6-29 days.................<15.0 mg/dL       Alkaline Phosphatase   Date Value Ref Range Status   01/26/2022 86 55 - 135 U/L Final     AST   Date Value Ref Range Status   01/26/2022 20 10 - 40 U/L Final      ALT   Date Value Ref Range Status   01/26/2022 21 10 - 44 U/L Final     Anion Gap   Date Value Ref Range Status   01/26/2022 11 8 - 16 mmol/L Final     eGFR if    Date Value Ref Range Status   01/26/2022 59.6 (A) >60 mL/min/1.73 m^2 Final     eGFR if non    Date Value Ref Range Status   01/26/2022 51.7 (A) >60 mL/min/1.73 m^2 Final     Comment:     Calculation used to obtain the estimated glomerular filtration  rate (eGFR) is the CKD-EPI equation.                Radiology/Diagnostic Studies:      CT abdom/pelvis  9/7/2021:      IMPRESSION:  1. Postoperative changes of prior GIST tumor resection of the cardia of the stomach, with no lymphadenopathy or CT finding of residual/recurrent disease.  2. No acute abdominal or pelvic abnormality.          US  abdom  7/28/2021:  IMPRESSION:  Dilated common duct reaching diameter of 8 mm. Further evaluation with CT abdomen and pelvis with IV contrast and MRCP are recommended.         CT abdom/pelvis  9/2/2020:    IMPRESSION:     1.  Status post resection of gastric GIST tumor. No recurrent tumor  or regional soft tissue abnormality identified.  2.  No other acute changes when compared previous exam.         PET  7/29/2019:  Impression       No FDG PET/CT findings to suggest recurrent or metastatic disease.             Chest CT 4/24/2019    IMPRESSION:  1. No acute abnormality seen.  2. The 2 lung nodules seen on CT PET scan dated 01/29/2019 represent calcified  granulomas.  3. There are several small perifissural lymph nodes in the right lung measuring  5 mm or less in diameter.  4. There  is no evidence of lung metastasis.  5. No change in the sclerotic focus in the right scapula described on the  previous PET scan and increases the probability of it representing a benign  lesion.        PET 1/15/2019:  IMPRESSION:    1. No current convincing evidence for residual malignancy or metastatic disease.    2. Incidental findings tiny noncalcified  "left lung nodules and sclerotic focus  in right scapula. Benign etiologies are favored, although metastatic disease is  conceivable but felt less likely. CT thorax without IV contrast follow-up is  recommended in 3 months to begin to document prolonged stability if imaging  follow-up suggested for at least 2 years. If old outside CTs of the chest are  available to document prolonged stability and these are made available,  addendum can be issued at that time.    3. Focus of FDG uptake along deep aspect of umbilicus without CT correlate is  likely inflammatory in nature and related to recent surgery.        I have reviewed all available lab results and radiology reports.    Assessment/Plan:   (1) 67 y.o. female with diagnosis of a submucosal nodule of the gastric cardia who has been referred by Dr Yunior Wong with GI for evaluation by medical hematology/oncology.   - She had originally presented with abdominal pain and underwent EGD with Dr Wong on 10/19/2018.   - She had a CT scan on 10/11/2018 which showed a 2.7 x 1.6cm soft tissue mass in the cardia of the stomach.   - on EGD, he found a submucosal nodule in the cardia of the stomach.   - Pathology from the initial biopsy seems to have been nondiagnostic.   - she saw Dr Garcia with Gen Surg and underwent a wedge resection on 11/27/2018 with the pathology ultimately showing GIST  - G1: Low Grade; pT2NX, (Stage IA).  - upon review of the latest guidelines, she should not need any adjuvant therapy with the drug Gleevec due to the low-grade nature of her GIST  - "According to pathologist, her tumor was 2.5cm but was low grade with mitotic rate < or = to 5/50 hpf  - her risk assessment as a result is very low risk at 1.9% risk on metastasis"   - as a result, based on the latest NCCN guidelines (v. 1.2019) I would recommend surveillance only and she should not need Gleevec therapy at this time    - she had PEt on 7/29/2019 7/21/2020:  - She last saw Dr Wong " "about 4 months ago and had repeat scope which was "good" per patient   - she is due for repeat labs and scnas    1/21/2021:  - some residual reflux symptoms but she has not seen Dr Wong in over a year  - scans in Sept 2020 on chart and good  - repeat scans again in Sept 2021 7/29/2021:  - check up to date labs  - CT scheduled for Sept 7th 2021  - she needs to f/u with Dr Wong with GI for the reflux sx's  - recent US done with Dr mustafa with some dilation of the bile duct    1/27/2022:  - she had CT scan in Sept 2021 which were stable with no evidence of recurrent cancer  - she has some residual reflux which is controlled with prilosec  - she has not follow-up with Dr Wong recently and I encouraged her to do so      7/27/2022:  - she is doing ok with no new issues  - I have recommended repeat CT scans in Sept 2022  - she needs up to date labs  - I recommend and encouraged her to f/u with GI for repeat endoscopies    (2) HTN and hypercholesterolemia     (3) Hernia     (4) Chronic gastritis  -    (5) Low potassium     (6) S/p prior bladder drainage issue          VISIT DIAGNOSES:      Gastrointestinal stromal tumor (GIST) of stomach    Mass of stomach - cardia    Abnormal computed tomography of stomach          PLAN:  1. F/u with GI for her chronic reflux, endoscopies , etc (encouarged)  2. No indication for Gleevec at this time  3. F/u with PCP, GI, Card, Gen Surg etc  4.  repeat scan in Sept 2022  5. RTC in  6 months    Fax note to Judith Banks; Azeb Mustafa    Discussion:     Pathology Discussion:    I reviewed and discussed the pathology report(s) and radiograph reports (if available) in as simple to understand and/or laymen's terms to the best of my ability. I had an indepth conversation with the patient and went over the patient's individual diagnosis based on the information that was currently available. I discussed the TNM staging process with regard to the patient's particular cancer " "type, and the calculated stage based on the currently available TNM data and literature. I discussed the available prognostic data with regard to the current staging information and how it relates to the prognosis of their particular neoplastic process.          NCCN Guidelines:    I discussed the available treatment option(s) in accordance with the latest literature from the NCCN Clinical Practice Guidelines for the patient's particular type of cancer disorder. The NCCN Guidelines provide a "document evidence-based (and) consensus-driven management" of the care of oncology patients. The treatment recommendations were made not only in accordance to the NCCN guidelines, but also factored in to account the patient's overall age, condition, performance status and their medical co-morbidities. I went over the risks and benefits of the the treatment options (if any could be made) with regard to their particular cancer type, their cancer stage, their age, and their co-morbidities.            COVID-19 Discussion:    I had long discussion with patient and any applicable family about the COVID-19 coronavirus epidemic and the recommended precautions with regard to cancer and/or hematology patients. I have re-iterated the CDC recommendations for adequate hand washing, use of hand -like products, and coughing into elbow, etc. In addition, especially for our patients who are on chemotherapy and/or our otherwise immunocompromised patients, I have recommended avoidance of crowds, including movie theaters, restaurants, churches, etc. I have recommended avoidance of any sick or symptomatic family members and/or friends. I have also recommended avoidance of any raw and unwashed food products, and general avoidance of food items that have not been prepared by themselves. The patient has been asked to call us immediately with any symptom developments, issues, questions or other general concerns.       I spent over 25 mins of " time with the patient. Reviewed results of the recently ordered labs, tests and studies; made directives with regards to the results. Over half of this time was spent couseling and coordinating care.    I have explained all of the above in detail and the patient understands all of the current recommendation(s). I have answered all of their questions to the best of my ability and to their complete satisfaction.   The patient is to continue with the current management plan.    Electronically signed by William Joe MD

## 2022-07-27 ENCOUNTER — TELEPHONE (OUTPATIENT)
Dept: HEMATOLOGY/ONCOLOGY | Facility: CLINIC | Age: 67
End: 2022-07-27

## 2022-07-27 ENCOUNTER — TELEPHONE (OUTPATIENT)
Dept: GASTROENTEROLOGY | Facility: CLINIC | Age: 67
End: 2022-07-27
Payer: COMMERCIAL

## 2022-07-27 ENCOUNTER — OFFICE VISIT (OUTPATIENT)
Dept: HEMATOLOGY/ONCOLOGY | Facility: CLINIC | Age: 67
End: 2022-07-27
Payer: COMMERCIAL

## 2022-07-27 ENCOUNTER — LAB VISIT (OUTPATIENT)
Dept: LAB | Facility: HOSPITAL | Age: 67
End: 2022-07-27
Attending: INTERNAL MEDICINE
Payer: COMMERCIAL

## 2022-07-27 VITALS
DIASTOLIC BLOOD PRESSURE: 61 MMHG | RESPIRATION RATE: 18 BRPM | WEIGHT: 147.38 LBS | HEART RATE: 64 BPM | BODY MASS INDEX: 26.11 KG/M2 | HEIGHT: 63 IN | TEMPERATURE: 98 F | SYSTOLIC BLOOD PRESSURE: 134 MMHG

## 2022-07-27 DIAGNOSIS — C49.A2 GASTROINTESTINAL STROMAL TUMOR (GIST) OF STOMACH: ICD-10-CM

## 2022-07-27 DIAGNOSIS — K31.89 MASS OF STOMACH: Primary | ICD-10-CM

## 2022-07-27 DIAGNOSIS — C49.A2 GASTROINTESTINAL STROMAL TUMOR (GIST) OF STOMACH: Primary | ICD-10-CM

## 2022-07-27 DIAGNOSIS — K21.9 GASTROESOPHAGEAL REFLUX DISEASE, UNSPECIFIED WHETHER ESOPHAGITIS PRESENT: ICD-10-CM

## 2022-07-27 DIAGNOSIS — K31.89 MASS OF STOMACH: ICD-10-CM

## 2022-07-27 DIAGNOSIS — R93.3 ABNORMAL COMPUTED TOMOGRAPHY OF STOMACH: ICD-10-CM

## 2022-07-27 LAB
ALBUMIN SERPL BCP-MCNC: 4.2 G/DL (ref 3.5–5.2)
ALP SERPL-CCNC: 98 U/L (ref 55–135)
ALT SERPL W/O P-5'-P-CCNC: 18 U/L (ref 10–44)
ANION GAP SERPL CALC-SCNC: 8 MMOL/L (ref 8–16)
AST SERPL-CCNC: 21 U/L (ref 10–40)
BASOPHILS # BLD AUTO: 0.06 K/UL (ref 0–0.2)
BASOPHILS NFR BLD: 0.7 % (ref 0–1.9)
BILIRUB SERPL-MCNC: 0.6 MG/DL (ref 0.1–1)
BUN SERPL-MCNC: 21 MG/DL (ref 8–23)
CALCIUM SERPL-MCNC: 9.7 MG/DL (ref 8.7–10.5)
CHLORIDE SERPL-SCNC: 103 MMOL/L (ref 95–110)
CO2 SERPL-SCNC: 27 MMOL/L (ref 23–29)
CREAT SERPL-MCNC: 1.1 MG/DL (ref 0.5–1.4)
DIFFERENTIAL METHOD: NORMAL
EOSINOPHIL # BLD AUTO: 0.1 K/UL (ref 0–0.5)
EOSINOPHIL NFR BLD: 1.6 % (ref 0–8)
ERYTHROCYTE [DISTWIDTH] IN BLOOD BY AUTOMATED COUNT: 13.1 % (ref 11.5–14.5)
EST. GFR  (AFRICAN AMERICAN): 59.2 ML/MIN/1.73 M^2
EST. GFR  (NON AFRICAN AMERICAN): 51.3 ML/MIN/1.73 M^2
GLUCOSE SERPL-MCNC: 101 MG/DL (ref 70–110)
HCT VFR BLD AUTO: 38.9 % (ref 37–48.5)
HGB BLD-MCNC: 12.8 G/DL (ref 12–16)
IMM GRANULOCYTES # BLD AUTO: 0.03 K/UL (ref 0–0.04)
IMM GRANULOCYTES NFR BLD AUTO: 0.4 % (ref 0–0.5)
LYMPHOCYTES # BLD AUTO: 2.5 K/UL (ref 1–4.8)
LYMPHOCYTES NFR BLD: 29.6 % (ref 18–48)
MCH RBC QN AUTO: 31 PG (ref 27–31)
MCHC RBC AUTO-ENTMCNC: 32.9 G/DL (ref 32–36)
MCV RBC AUTO: 94 FL (ref 82–98)
MONOCYTES # BLD AUTO: 0.6 K/UL (ref 0.3–1)
MONOCYTES NFR BLD: 7.2 % (ref 4–15)
NEUTROPHILS # BLD AUTO: 5.1 K/UL (ref 1.8–7.7)
NEUTROPHILS NFR BLD: 60.5 % (ref 38–73)
NRBC BLD-RTO: 0 /100 WBC
PLATELET # BLD AUTO: 240 K/UL (ref 150–450)
PMV BLD AUTO: 9.9 FL (ref 9.2–12.9)
POTASSIUM SERPL-SCNC: 4.4 MMOL/L (ref 3.5–5.1)
PROT SERPL-MCNC: 7.2 G/DL (ref 6–8.4)
RBC # BLD AUTO: 4.13 M/UL (ref 4–5.4)
SODIUM SERPL-SCNC: 138 MMOL/L (ref 136–145)
WBC # BLD AUTO: 8.37 K/UL (ref 3.9–12.7)

## 2022-07-27 PROCEDURE — 85025 COMPLETE CBC W/AUTO DIFF WBC: CPT | Performed by: INTERNAL MEDICINE

## 2022-07-27 PROCEDURE — 1126F AMNT PAIN NOTED NONE PRSNT: CPT | Mod: CPTII,S$GLB,, | Performed by: INTERNAL MEDICINE

## 2022-07-27 PROCEDURE — 3078F DIAST BP <80 MM HG: CPT | Mod: CPTII,S$GLB,, | Performed by: INTERNAL MEDICINE

## 2022-07-27 PROCEDURE — 3008F BODY MASS INDEX DOCD: CPT | Mod: CPTII,S$GLB,, | Performed by: INTERNAL MEDICINE

## 2022-07-27 PROCEDURE — 3075F SYST BP GE 130 - 139MM HG: CPT | Mod: CPTII,S$GLB,, | Performed by: INTERNAL MEDICINE

## 2022-07-27 PROCEDURE — 80053 COMPREHEN METABOLIC PANEL: CPT | Performed by: INTERNAL MEDICINE

## 2022-07-27 PROCEDURE — 1126F PR PAIN SEVERITY QUANTIFIED, NO PAIN PRESENT: ICD-10-PCS | Mod: CPTII,S$GLB,, | Performed by: INTERNAL MEDICINE

## 2022-07-27 PROCEDURE — 99214 PR OFFICE/OUTPT VISIT, EST, LEVL IV, 30-39 MIN: ICD-10-PCS | Mod: S$GLB,,, | Performed by: INTERNAL MEDICINE

## 2022-07-27 PROCEDURE — 1159F PR MEDICATION LIST DOCUMENTED IN MEDICAL RECORD: ICD-10-PCS | Mod: CPTII,S$GLB,, | Performed by: INTERNAL MEDICINE

## 2022-07-27 PROCEDURE — 1101F PT FALLS ASSESS-DOCD LE1/YR: CPT | Mod: CPTII,S$GLB,, | Performed by: INTERNAL MEDICINE

## 2022-07-27 PROCEDURE — 99214 OFFICE O/P EST MOD 30 MIN: CPT | Mod: S$GLB,,, | Performed by: INTERNAL MEDICINE

## 2022-07-27 PROCEDURE — 3078F PR MOST RECENT DIASTOLIC BLOOD PRESSURE < 80 MM HG: ICD-10-PCS | Mod: CPTII,S$GLB,, | Performed by: INTERNAL MEDICINE

## 2022-07-27 PROCEDURE — 36415 COLL VENOUS BLD VENIPUNCTURE: CPT | Performed by: INTERNAL MEDICINE

## 2022-07-27 PROCEDURE — 3288F FALL RISK ASSESSMENT DOCD: CPT | Mod: CPTII,S$GLB,, | Performed by: INTERNAL MEDICINE

## 2022-07-27 PROCEDURE — 3008F PR BODY MASS INDEX (BMI) DOCUMENTED: ICD-10-PCS | Mod: CPTII,S$GLB,, | Performed by: INTERNAL MEDICINE

## 2022-07-27 PROCEDURE — 3075F PR MOST RECENT SYSTOLIC BLOOD PRESS GE 130-139MM HG: ICD-10-PCS | Mod: CPTII,S$GLB,, | Performed by: INTERNAL MEDICINE

## 2022-07-27 PROCEDURE — 1160F RVW MEDS BY RX/DR IN RCRD: CPT | Mod: CPTII,S$GLB,, | Performed by: INTERNAL MEDICINE

## 2022-07-27 PROCEDURE — 1159F MED LIST DOCD IN RCRD: CPT | Mod: CPTII,S$GLB,, | Performed by: INTERNAL MEDICINE

## 2022-07-27 PROCEDURE — 1101F PR PT FALLS ASSESS DOC 0-1 FALLS W/OUT INJ PAST YR: ICD-10-PCS | Mod: CPTII,S$GLB,, | Performed by: INTERNAL MEDICINE

## 2022-07-27 PROCEDURE — 1160F PR REVIEW ALL MEDS BY PRESCRIBER/CLIN PHARMACIST DOCUMENTED: ICD-10-PCS | Mod: CPTII,S$GLB,, | Performed by: INTERNAL MEDICINE

## 2022-07-27 PROCEDURE — 3288F PR FALLS RISK ASSESSMENT DOCUMENTED: ICD-10-PCS | Mod: CPTII,S$GLB,, | Performed by: INTERNAL MEDICINE

## 2022-07-27 NOTE — TELEPHONE ENCOUNTER
Hi, at this time we are unable to schedule procedure for [employer]. I believe they have to have procedures done at [facility].

## 2022-07-27 NOTE — TELEPHONE ENCOUNTER
----- Message from Teresita Floyd sent at 7/27/2022  1:43 PM CDT -----  Good afternoon,     Dr. Joe is looking to refer Mrs. Warner for a scope, but wanted to find out if your office accepts [facility] insurance.     Thank you.

## 2022-07-29 ENCOUNTER — HOSPITAL ENCOUNTER (OUTPATIENT)
Dept: RADIOLOGY | Facility: HOSPITAL | Age: 67
Discharge: HOME | End: 2022-07-29
Attending: INTERNAL MEDICINE
Payer: COMMERCIAL

## 2022-07-29 DIAGNOSIS — C49.A2 GASTROINTESTINAL STROMAL TUMOR (GIST) OF STOMACH: ICD-10-CM

## 2022-07-29 PROCEDURE — 71046 X-RAY EXAM CHEST 2 VIEWS: CPT | Mod: TC,PO
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
## 2022-09-01 ENCOUNTER — LAB VISIT (OUTPATIENT)
Dept: LAB | Facility: HOSPITAL | Age: 67
End: 2022-09-01
Attending: INTERNAL MEDICINE
Payer: COMMERCIAL

## 2022-09-01 DIAGNOSIS — N18.30 CHRONIC KIDNEY DISEASE, STAGE III (MODERATE): ICD-10-CM

## 2022-09-01 DIAGNOSIS — E55.9 AVITAMINOSIS D: Primary | ICD-10-CM

## 2022-09-01 LAB
25(OH)D3+25(OH)D2 SERPL-MCNC: 34 NG/ML (ref 30–96)
ALBUMIN SERPL BCP-MCNC: 4 G/DL (ref 3.5–5.2)
ANION GAP SERPL CALC-SCNC: 5 MMOL/L (ref 8–16)
BASOPHILS # BLD AUTO: 0.06 K/UL (ref 0–0.2)
BASOPHILS NFR BLD: 1 % (ref 0–1.9)
BUN SERPL-MCNC: 19 MG/DL (ref 8–23)
CALCIUM SERPL-MCNC: 9.5 MG/DL (ref 8.7–10.5)
CHLORIDE SERPL-SCNC: 109 MMOL/L (ref 95–110)
CO2 SERPL-SCNC: 26 MMOL/L (ref 23–29)
CREAT SERPL-MCNC: 1.2 MG/DL (ref 0.5–1.4)
DIFFERENTIAL METHOD: ABNORMAL
EOSINOPHIL # BLD AUTO: 0.2 K/UL (ref 0–0.5)
EOSINOPHIL NFR BLD: 3.2 % (ref 0–8)
ERYTHROCYTE [DISTWIDTH] IN BLOOD BY AUTOMATED COUNT: 13.3 % (ref 11.5–14.5)
EST. GFR  (NO RACE VARIABLE): 49 ML/MIN/1.73 M^2
GLUCOSE SERPL-MCNC: 105 MG/DL (ref 70–110)
HCT VFR BLD AUTO: 40.8 % (ref 37–48.5)
HGB BLD-MCNC: 13.5 G/DL (ref 12–16)
IMM GRANULOCYTES # BLD AUTO: 0.02 K/UL (ref 0–0.04)
IMM GRANULOCYTES NFR BLD AUTO: 0.3 % (ref 0–0.5)
LYMPHOCYTES # BLD AUTO: 2.5 K/UL (ref 1–4.8)
LYMPHOCYTES NFR BLD: 40.7 % (ref 18–48)
MCH RBC QN AUTO: 31.2 PG (ref 27–31)
MCHC RBC AUTO-ENTMCNC: 33.1 G/DL (ref 32–36)
MCV RBC AUTO: 94 FL (ref 82–98)
MONOCYTES # BLD AUTO: 0.5 K/UL (ref 0.3–1)
MONOCYTES NFR BLD: 7.5 % (ref 4–15)
NEUTROPHILS # BLD AUTO: 2.9 K/UL (ref 1.8–7.7)
NEUTROPHILS NFR BLD: 47.3 % (ref 38–73)
NRBC BLD-RTO: 0 /100 WBC
PHOSPHATE SERPL-MCNC: 2.7 MG/DL (ref 2.7–4.5)
PLATELET # BLD AUTO: 223 K/UL (ref 150–450)
PMV BLD AUTO: 10.4 FL (ref 9.2–12.9)
POTASSIUM SERPL-SCNC: 3.6 MMOL/L (ref 3.5–5.1)
PTH-INTACT SERPL-MCNC: 66.4 PG/ML (ref 9–77)
RBC # BLD AUTO: 4.33 M/UL (ref 4–5.4)
SODIUM SERPL-SCNC: 140 MMOL/L (ref 136–145)
WBC # BLD AUTO: 6.02 K/UL (ref 3.9–12.7)

## 2022-09-01 PROCEDURE — 81001 URINALYSIS AUTO W/SCOPE: CPT | Performed by: INTERNAL MEDICINE

## 2022-09-01 PROCEDURE — 36415 COLL VENOUS BLD VENIPUNCTURE: CPT | Performed by: INTERNAL MEDICINE

## 2022-09-01 PROCEDURE — 83970 ASSAY OF PARATHORMONE: CPT | Performed by: INTERNAL MEDICINE

## 2022-09-01 PROCEDURE — 85025 COMPLETE CBC W/AUTO DIFF WBC: CPT | Performed by: INTERNAL MEDICINE

## 2022-09-01 PROCEDURE — 82306 VITAMIN D 25 HYDROXY: CPT | Performed by: INTERNAL MEDICINE

## 2022-09-01 PROCEDURE — 80069 RENAL FUNCTION PANEL: CPT | Performed by: INTERNAL MEDICINE

## 2022-09-06 ENCOUNTER — HOSPITAL ENCOUNTER (OUTPATIENT)
Dept: RADIOLOGY | Facility: HOSPITAL | Age: 67
Discharge: HOME | End: 2022-09-06
Attending: INTERNAL MEDICINE
Payer: COMMERCIAL

## 2022-09-06 DIAGNOSIS — C49.A2 GASTROINTESTINAL STROMAL TUMOR (GIST) OF STOMACH: ICD-10-CM

## 2022-09-06 LAB
BACTERIA #/AREA URNS HPF: NEGATIVE /HPF
HYALINE CASTS #/AREA URNS LPF: 2 /LPF
MICROSCOPIC COMMENT: ABNORMAL
RBC #/AREA URNS HPF: 1 /HPF (ref 0–4)
SQUAMOUS #/AREA URNS HPF: 1 /HPF
WBC #/AREA URNS HPF: 1 /HPF (ref 0–5)

## 2022-09-06 PROCEDURE — 74177 CT ABD & PELVIS W/CONTRAST: CPT | Mod: TC,PO

## 2022-09-06 PROCEDURE — 25500020 PHARM REV CODE 255: Mod: PO | Performed by: INTERNAL MEDICINE

## 2022-09-06 RX ADMIN — IOHEXOL 100 ML: 350 INJECTION, SOLUTION INTRAVENOUS at 08:09

## 2022-09-09 ENCOUNTER — TELEPHONE (OUTPATIENT)
Dept: HEMATOLOGY/ONCOLOGY | Facility: CLINIC | Age: 67
End: 2022-09-09

## 2022-09-09 NOTE — TELEPHONE ENCOUNTER
----- Message from William Joe MD sent at 9/6/2022  5:11 PM CDT -----  No evidence of cancer but she has some chronic right sided hydronephrosis - recommend  evaluation

## 2022-09-12 ENCOUNTER — TELEPHONE (OUTPATIENT)
Dept: HEMATOLOGY/ONCOLOGY | Facility: CLINIC | Age: 67
End: 2022-09-12

## 2022-09-12 DIAGNOSIS — N13.30 HYDRONEPHROSIS, UNSPECIFIED HYDRONEPHROSIS TYPE: Primary | ICD-10-CM

## 2022-09-13 ENCOUNTER — TELEPHONE (OUTPATIENT)
Dept: UROLOGY | Facility: CLINIC | Age: 67
End: 2022-09-13
Payer: COMMERCIAL

## 2022-09-13 NOTE — TELEPHONE ENCOUNTER
Called and spoke to patient's , informed of referral and appt made and directions given, he verbally understood.

## 2022-09-14 NOTE — H&P (VIEW-ONLY)
[facility] Note    PCP: Will Velarde MD    Chief Complaint: right hydro    SUBJECTIVE:       History of Present Illness:  Lorraine Warner is a 67 y.o. female who presents to clinic for right hydro. She is New  to our clinic.     Recently underwent a CT scan which showed mild/moderate right hydro to the UPJ. In 7/2021 she had a CT which is read as parapelvic cysts with no hydro, this is similar to previous reads however on her PET scan it appears to be more consistent with hydro.     No right sided flank pain.   No issues with urination.   No hematuria. No dysuria.     UA today: +leuks and blood - asymptomatic     Last urine culture: no documented UTIs    Lab Results   Component Value Date    CREATININE 1.2 09/01/2022     Family  hx: no bladder or kidney cancer  Hx of HLD, HTN, CAD, GIST tumor    Past medical, family, and social history reviewed as documented in chart with pertinent positive medical, family, and social history detailed in HPI.    Review of patient's allergies indicates:  No Known Allergies    Past Medical History:   Diagnosis Date    Abnormal computed tomography of stomach 10/24/2018    Abnormal CT of the abdomen (soft tissue mass cardia of stomach) 10/24/2018    Chest pressure     @ rest within last week- no NTG taken - sees Dr. Almeida - seen last within month    Coronary artery disease     Gastrointestinal stromal tumor (GIST) of stomach 1/15/2019    GERD (gastroesophageal reflux disease)     Hyperlipidemia     Hypertension     Mass of stomach - cardia 10/24/2018     Past Surgical History:   Procedure Laterality Date    ANGIOGRAM, CORONARY, WITH LEFT HEART CATHETERIZATION N/A 8/27/2019    Procedure: ANGIOGRAM,CORONARY,WITH LEFT HEART CATHETERIZATION;  Surgeon: Chandana Almeida MD;  Location: [facility] CATH/EP LAB;  Service: Cardiology;  Laterality: N/A;    BLADDER SURGERY      CORONARY STENT PLACEMENT  2018    ENDOSCOPIC ULTRASOUND OF UPPER GASTROINTESTINAL TRACT   "08/10/2021    ENDOSCOPIC ULTRASOUND OF UPPER GASTROINTESTINAL TRACT N/A 8/10/2021    Procedure: ULTRASOUND, UPPER GI TRACT, ENDOSCOPIC;  Surgeon: Kash Lyons III, MD;  Location: [facility];  Service: Endoscopy;  Laterality: N/A;    LAPAROSCOPIC PARTIAL GASTRECTOMY  11/27/2018    Dr. Garcia     UPPER GASTROINTESTINAL ENDOSCOPY  08/10/2021     Family History   Problem Relation Age of Onset    No Known Problems Mother     No Known Problems Father      Social History     Tobacco Use    Smoking status: Never    Smokeless tobacco: Never   Substance Use Topics    Alcohol use: No    Drug use: No        Review of Systems   Genitourinary:  Negative for difficulty urinating, dysuria, flank pain, frequency, hematuria, nocturia and urgency.     OBJECTIVE:     Anticoagulation:  aspirin 81 mg     Estimated body mass index is 26.11 kg/m² as calculated from the following:    Height as of 7/27/22: 5' 3" (1.6 m).    Weight as of 7/27/22: 66.9 kg (147 lb 6.4 oz).    Vital Signs (Most Recent)  Pulse: 64 (09/15/22 1041)  BP: 138/76 (09/15/22 1041)    Physical Exam  Vitals reviewed.   Constitutional:       General: She is not in acute distress.     Appearance: Normal appearance. She is not ill-appearing.   HENT:      Head: Normocephalic and atraumatic.   Eyes:      General: No scleral icterus.  Cardiovascular:      Rate and Rhythm: Normal rate and regular rhythm.   Pulmonary:      Effort: Pulmonary effort is normal. No respiratory distress.   Abdominal:      Palpations: Abdomen is soft.   Skin:     Coloration: Skin is not jaundiced.   Neurological:      General: No focal deficit present.      Mental Status: She is alert and oriented to person, place, and time.   Psychiatric:         Mood and Affect: Mood normal.         Behavior: Behavior normal.       BMP  Lab Results   Component Value Date     09/01/2022    K 3.6 09/01/2022     09/01/2022    CO2 26 09/01/2022    BUN 19 09/01/2022    CREATININE 1.2 09/01/2022    " CALCIUM 9.5 09/01/2022    ANIONGAP 5 (L) 09/01/2022    ESTGFRAFRICA 59.2 (A) 07/27/2022    EGFRNONAA 51.3 (A) 07/27/2022       Lab Results   Component Value Date    WBC 6.02 09/01/2022    HGB 13.5 09/01/2022    HCT 40.8 09/01/2022    MCV 94 09/01/2022     09/01/2022     Imaging:  Per HPI    ASSESSMENT     1. Hydronephrosis of right kidney    2. Gastrointestinal stromal tumor (GIST) of stomach        PLAN:     - We reviewed her imaging in detail. Appears that she has probable hydro vs parapelvic cyst of the right kidney but this is chronic  - She is asymptomatic   - Discussed cysto with retrograde pyelogram to confirm. She is agreeable to this  - Scheduled 9/30  - Urine culture week prior   - If UPJo confirmed will need MAG 3 renal scan to assess function and drainage, however as long as kidneys are equal function can be observed as she is asymptomatic     Giovana Kennedy MD     Letter to William Joe MD     I spent over 45 minutes with the patient. Over 50% of the visit was spent in counseling.

## 2022-09-14 NOTE — PROGRESS NOTES
[facility] Note    PCP: Will Velarde MD    Chief Complaint: right hydro    SUBJECTIVE:       History of Present Illness:  Lorraine Warner is a 67 y.o. female who presents to clinic for right hydro. She is New  to our clinic.     Recently underwent a CT scan which showed mild/moderate right hydro to the UPJ. In 7/2021 she had a CT which is read as parapelvic cysts with no hydro, this is similar to previous reads however on her PET scan it appears to be more consistent with hydro.     No right sided flank pain.   No issues with urination.   No hematuria. No dysuria.     UA today: +leuks and blood - asymptomatic     Last urine culture: no documented UTIs    Lab Results   Component Value Date    CREATININE 1.2 09/01/2022     Family  hx: no bladder or kidney cancer  Hx of HLD, HTN, CAD, GIST tumor    Past medical, family, and social history reviewed as documented in chart with pertinent positive medical, family, and social history detailed in HPI.    Review of patient's allergies indicates:  No Known Allergies    Past Medical History:   Diagnosis Date    Abnormal computed tomography of stomach 10/24/2018    Abnormal CT of the abdomen (soft tissue mass cardia of stomach) 10/24/2018    Chest pressure     @ rest within last week- no NTG taken - sees Dr. Almeida - seen last within month    Coronary artery disease     Gastrointestinal stromal tumor (GIST) of stomach 1/15/2019    GERD (gastroesophageal reflux disease)     Hyperlipidemia     Hypertension     Mass of stomach - cardia 10/24/2018     Past Surgical History:   Procedure Laterality Date    ANGIOGRAM, CORONARY, WITH LEFT HEART CATHETERIZATION N/A 8/27/2019    Procedure: ANGIOGRAM,CORONARY,WITH LEFT HEART CATHETERIZATION;  Surgeon: Chandana Almeida MD;  Location: [facility] CATH/EP LAB;  Service: Cardiology;  Laterality: N/A;    BLADDER SURGERY      CORONARY STENT PLACEMENT  2018    ENDOSCOPIC ULTRASOUND OF UPPER GASTROINTESTINAL TRACT   "08/10/2021    ENDOSCOPIC ULTRASOUND OF UPPER GASTROINTESTINAL TRACT N/A 8/10/2021    Procedure: ULTRASOUND, UPPER GI TRACT, ENDOSCOPIC;  Surgeon: Kash Lyons III, MD;  Location: [facility];  Service: Endoscopy;  Laterality: N/A;    LAPAROSCOPIC PARTIAL GASTRECTOMY  11/27/2018    Dr. Garcia     UPPER GASTROINTESTINAL ENDOSCOPY  08/10/2021     Family History   Problem Relation Age of Onset    No Known Problems Mother     No Known Problems Father      Social History     Tobacco Use    Smoking status: Never    Smokeless tobacco: Never   Substance Use Topics    Alcohol use: No    Drug use: No        Review of Systems   Genitourinary:  Negative for difficulty urinating, dysuria, flank pain, frequency, hematuria, nocturia and urgency.     OBJECTIVE:     Anticoagulation:  aspirin 81 mg     Estimated body mass index is 26.11 kg/m² as calculated from the following:    Height as of 7/27/22: 5' 3" (1.6 m).    Weight as of 7/27/22: 66.9 kg (147 lb 6.4 oz).    Vital Signs (Most Recent)  Pulse: 64 (09/15/22 1041)  BP: 138/76 (09/15/22 1041)    Physical Exam  Vitals reviewed.   Constitutional:       General: She is not in acute distress.     Appearance: Normal appearance. She is not ill-appearing.   HENT:      Head: Normocephalic and atraumatic.   Eyes:      General: No scleral icterus.  Cardiovascular:      Rate and Rhythm: Normal rate and regular rhythm.   Pulmonary:      Effort: Pulmonary effort is normal. No respiratory distress.   Abdominal:      Palpations: Abdomen is soft.   Skin:     Coloration: Skin is not jaundiced.   Neurological:      General: No focal deficit present.      Mental Status: She is alert and oriented to person, place, and time.   Psychiatric:         Mood and Affect: Mood normal.         Behavior: Behavior normal.       BMP  Lab Results   Component Value Date     09/01/2022    K 3.6 09/01/2022     09/01/2022    CO2 26 09/01/2022    BUN 19 09/01/2022    CREATININE 1.2 09/01/2022    " CALCIUM 9.5 09/01/2022    ANIONGAP 5 (L) 09/01/2022    ESTGFRAFRICA 59.2 (A) 07/27/2022    EGFRNONAA 51.3 (A) 07/27/2022       Lab Results   Component Value Date    WBC 6.02 09/01/2022    HGB 13.5 09/01/2022    HCT 40.8 09/01/2022    MCV 94 09/01/2022     09/01/2022     Imaging:  Per HPI    ASSESSMENT     1. Hydronephrosis of right kidney    2. Gastrointestinal stromal tumor (GIST) of stomach        PLAN:     - We reviewed her imaging in detail. Appears that she has probable hydro vs parapelvic cyst of the right kidney but this is chronic  - She is asymptomatic   - Discussed cysto with retrograde pyelogram to confirm. She is agreeable to this  - Scheduled 9/30  - Urine culture week prior   - If UPJo confirmed will need MAG 3 renal scan to assess function and drainage, however as long as kidneys are equal function can be observed as she is asymptomatic     Giovana Kennedy MD     Letter to William Joe MD     I spent over 45 minutes with the patient. Over 50% of the visit was spent in counseling.

## 2022-09-15 ENCOUNTER — OFFICE VISIT (OUTPATIENT)
Dept: UROLOGY | Facility: CLINIC | Age: 67
End: 2022-09-15
Payer: COMMERCIAL

## 2022-09-15 VITALS — DIASTOLIC BLOOD PRESSURE: 76 MMHG | HEART RATE: 64 BPM | SYSTOLIC BLOOD PRESSURE: 138 MMHG

## 2022-09-15 DIAGNOSIS — N13.30 HYDRONEPHROSIS OF RIGHT KIDNEY: Primary | ICD-10-CM

## 2022-09-15 DIAGNOSIS — C49.A2 GASTROINTESTINAL STROMAL TUMOR (GIST) OF STOMACH: ICD-10-CM

## 2022-09-15 DIAGNOSIS — N13.30 HYDRONEPHROSIS, UNSPECIFIED HYDRONEPHROSIS TYPE: ICD-10-CM

## 2022-09-15 LAB
BILIRUB SERPL-MCNC: ABNORMAL MG/DL
BLOOD URINE, POC: ABNORMAL
CLARITY, POC UA: CLEAR
COLOR, POC UA: YELLOW
GLUCOSE UR QL STRIP: ABNORMAL
KETONES UR QL STRIP: ABNORMAL
LEUKOCYTE ESTERASE URINE, POC: ABNORMAL
NITRITE, POC UA: ABNORMAL
PH, POC UA: 5
PROTEIN, POC: 30
SPECIFIC GRAVITY, POC UA: 1.02
UROBILINOGEN, POC UA: 0.2

## 2022-09-15 PROCEDURE — 3078F DIAST BP <80 MM HG: CPT | Mod: CPTII,S$GLB,, | Performed by: STUDENT IN AN ORGANIZED HEALTH CARE EDUCATION/TRAINING PROGRAM

## 2022-09-15 PROCEDURE — 3288F FALL RISK ASSESSMENT DOCD: CPT | Mod: CPTII,S$GLB,, | Performed by: STUDENT IN AN ORGANIZED HEALTH CARE EDUCATION/TRAINING PROGRAM

## 2022-09-15 PROCEDURE — 1159F MED LIST DOCD IN RCRD: CPT | Mod: CPTII,S$GLB,, | Performed by: STUDENT IN AN ORGANIZED HEALTH CARE EDUCATION/TRAINING PROGRAM

## 2022-09-15 PROCEDURE — 1101F PR PT FALLS ASSESS DOC 0-1 FALLS W/OUT INJ PAST YR: ICD-10-PCS | Mod: CPTII,S$GLB,, | Performed by: STUDENT IN AN ORGANIZED HEALTH CARE EDUCATION/TRAINING PROGRAM

## 2022-09-15 PROCEDURE — 3078F PR MOST RECENT DIASTOLIC BLOOD PRESSURE < 80 MM HG: ICD-10-PCS | Mod: CPTII,S$GLB,, | Performed by: STUDENT IN AN ORGANIZED HEALTH CARE EDUCATION/TRAINING PROGRAM

## 2022-09-15 PROCEDURE — 99999 PR PBB SHADOW E&M-EST. PATIENT-LVL IV: ICD-10-PCS | Mod: PBBFAC,,, | Performed by: STUDENT IN AN ORGANIZED HEALTH CARE EDUCATION/TRAINING PROGRAM

## 2022-09-15 PROCEDURE — 1159F PR MEDICATION LIST DOCUMENTED IN MEDICAL RECORD: ICD-10-PCS | Mod: CPTII,S$GLB,, | Performed by: STUDENT IN AN ORGANIZED HEALTH CARE EDUCATION/TRAINING PROGRAM

## 2022-09-15 PROCEDURE — 99999 PR PBB SHADOW E&M-EST. PATIENT-LVL IV: CPT | Mod: PBBFAC,,, | Performed by: STUDENT IN AN ORGANIZED HEALTH CARE EDUCATION/TRAINING PROGRAM

## 2022-09-15 PROCEDURE — 3288F PR FALLS RISK ASSESSMENT DOCUMENTED: ICD-10-PCS | Mod: CPTII,S$GLB,, | Performed by: STUDENT IN AN ORGANIZED HEALTH CARE EDUCATION/TRAINING PROGRAM

## 2022-09-15 PROCEDURE — 81002 URINALYSIS NONAUTO W/O SCOPE: CPT | Mod: S$GLB,,, | Performed by: STUDENT IN AN ORGANIZED HEALTH CARE EDUCATION/TRAINING PROGRAM

## 2022-09-15 PROCEDURE — 3075F SYST BP GE 130 - 139MM HG: CPT | Mod: CPTII,S$GLB,, | Performed by: STUDENT IN AN ORGANIZED HEALTH CARE EDUCATION/TRAINING PROGRAM

## 2022-09-15 PROCEDURE — 1101F PT FALLS ASSESS-DOCD LE1/YR: CPT | Mod: CPTII,S$GLB,, | Performed by: STUDENT IN AN ORGANIZED HEALTH CARE EDUCATION/TRAINING PROGRAM

## 2022-09-15 PROCEDURE — 99204 PR OFFICE/OUTPT VISIT, NEW, LEVL IV, 45-59 MIN: ICD-10-PCS | Mod: S$GLB,,, | Performed by: STUDENT IN AN ORGANIZED HEALTH CARE EDUCATION/TRAINING PROGRAM

## 2022-09-15 PROCEDURE — 1126F PR PAIN SEVERITY QUANTIFIED, NO PAIN PRESENT: ICD-10-PCS | Mod: CPTII,S$GLB,, | Performed by: STUDENT IN AN ORGANIZED HEALTH CARE EDUCATION/TRAINING PROGRAM

## 2022-09-15 PROCEDURE — 99204 OFFICE O/P NEW MOD 45 MIN: CPT | Mod: S$GLB,,, | Performed by: STUDENT IN AN ORGANIZED HEALTH CARE EDUCATION/TRAINING PROGRAM

## 2022-09-15 PROCEDURE — 1126F AMNT PAIN NOTED NONE PRSNT: CPT | Mod: CPTII,S$GLB,, | Performed by: STUDENT IN AN ORGANIZED HEALTH CARE EDUCATION/TRAINING PROGRAM

## 2022-09-15 PROCEDURE — 81002 POCT URINE DIPSTICK WITHOUT MICROSCOPE: ICD-10-PCS | Mod: S$GLB,,, | Performed by: STUDENT IN AN ORGANIZED HEALTH CARE EDUCATION/TRAINING PROGRAM

## 2022-09-15 PROCEDURE — 3075F PR MOST RECENT SYSTOLIC BLOOD PRESS GE 130-139MM HG: ICD-10-PCS | Mod: CPTII,S$GLB,, | Performed by: STUDENT IN AN ORGANIZED HEALTH CARE EDUCATION/TRAINING PROGRAM

## 2022-09-21 ENCOUNTER — LAB VISIT (OUTPATIENT)
Dept: LAB | Facility: HOSPITAL | Age: 67
End: 2022-09-21
Attending: STUDENT IN AN ORGANIZED HEALTH CARE EDUCATION/TRAINING PROGRAM
Payer: COMMERCIAL

## 2022-09-21 DIAGNOSIS — N13.30 HYDRONEPHROSIS OF RIGHT KIDNEY: ICD-10-CM

## 2022-09-21 PROCEDURE — 87086 URINE CULTURE/COLONY COUNT: CPT | Performed by: STUDENT IN AN ORGANIZED HEALTH CARE EDUCATION/TRAINING PROGRAM

## 2022-09-21 PROCEDURE — 87186 SC STD MICRODIL/AGAR DIL: CPT | Performed by: STUDENT IN AN ORGANIZED HEALTH CARE EDUCATION/TRAINING PROGRAM

## 2022-09-21 PROCEDURE — 87077 CULTURE AEROBIC IDENTIFY: CPT | Performed by: STUDENT IN AN ORGANIZED HEALTH CARE EDUCATION/TRAINING PROGRAM

## 2022-09-21 PROCEDURE — 87088 URINE BACTERIA CULTURE: CPT | Performed by: STUDENT IN AN ORGANIZED HEALTH CARE EDUCATION/TRAINING PROGRAM

## 2022-09-23 ENCOUNTER — TELEPHONE (OUTPATIENT)
Dept: UROLOGY | Facility: HOSPITAL | Age: 67
End: 2022-09-23
Payer: COMMERCIAL

## 2022-09-23 RX ORDER — SULFAMETHOXAZOLE AND TRIMETHOPRIM 800; 160 MG/1; MG/1
1 TABLET ORAL 2 TIMES DAILY
Qty: 10 TABLET | Refills: 0 | Status: SHIPPED | OUTPATIENT
Start: 2022-09-23 | End: 2022-09-28

## 2022-09-23 RX ORDER — SULFAMETHOXAZOLE AND TRIMETHOPRIM 800; 160 MG/1; MG/1
1 TABLET ORAL 2 TIMES DAILY
Qty: 10 TABLET | Refills: 0 | Status: SHIPPED | OUTPATIENT
Start: 2022-09-23 | End: 2022-09-23

## 2022-09-23 NOTE — TELEPHONE ENCOUNTER
----- Message from Lenka Mario LPN sent at 9/23/2022  9:47 AM CDT -----  Results given to patient's . The antibiotic needs to go to the [facility].

## 2022-09-24 LAB — BACTERIA UR CULT: ABNORMAL

## 2022-09-28 NOTE — OR NURSING
PAT done via telephone. Message to Kambit, as well. Spoke with patient's  who verbalized understanding of all instructions.

## 2022-09-29 ENCOUNTER — ANESTHESIA EVENT (OUTPATIENT)
Dept: SURGERY | Facility: HOSPITAL | Age: 67
End: 2022-09-29
Payer: COMMERCIAL

## 2022-09-30 ENCOUNTER — HOSPITAL ENCOUNTER (OUTPATIENT)
Facility: HOSPITAL | Age: 67
Discharge: HOME | End: 2022-09-30
Attending: STUDENT IN AN ORGANIZED HEALTH CARE EDUCATION/TRAINING PROGRAM | Admitting: STUDENT IN AN ORGANIZED HEALTH CARE EDUCATION/TRAINING PROGRAM
Payer: COMMERCIAL

## 2022-09-30 ENCOUNTER — ANESTHESIA (OUTPATIENT)
Dept: SURGERY | Facility: HOSPITAL | Age: 67
End: 2022-09-30
Payer: COMMERCIAL

## 2022-09-30 DIAGNOSIS — Z01.818 PRE-OP EXAM: ICD-10-CM

## 2022-09-30 DIAGNOSIS — N13.30 HYDRONEPHROSIS OF RIGHT KIDNEY: Primary | ICD-10-CM

## 2022-09-30 DIAGNOSIS — N13.30 HYDRONEPHROSIS, RIGHT: ICD-10-CM

## 2022-09-30 PROCEDURE — 25000003 PHARM REV CODE 250: Performed by: ANESTHESIOLOGY

## 2022-09-30 PROCEDURE — 36000707: Performed by: STUDENT IN AN ORGANIZED HEALTH CARE EDUCATION/TRAINING PROGRAM

## 2022-09-30 PROCEDURE — 63600175 PHARM REV CODE 636 W HCPCS: Performed by: NURSE ANESTHETIST, CERTIFIED REGISTERED

## 2022-09-30 PROCEDURE — C1758 CATHETER, URETERAL: HCPCS | Performed by: STUDENT IN AN ORGANIZED HEALTH CARE EDUCATION/TRAINING PROGRAM

## 2022-09-30 PROCEDURE — 71000039 HC RECOVERY, EACH ADD'L HOUR: Performed by: STUDENT IN AN ORGANIZED HEALTH CARE EDUCATION/TRAINING PROGRAM

## 2022-09-30 PROCEDURE — 93010 ELECTROCARDIOGRAM REPORT: CPT | Mod: ,,, | Performed by: INTERNAL MEDICINE

## 2022-09-30 PROCEDURE — 25000003 PHARM REV CODE 250: Performed by: NURSE ANESTHETIST, CERTIFIED REGISTERED

## 2022-09-30 PROCEDURE — D9220A PRA ANESTHESIA: Mod: CRNA,,, | Performed by: NURSE ANESTHETIST, CERTIFIED REGISTERED

## 2022-09-30 PROCEDURE — 37000008 HC ANESTHESIA 1ST 15 MINUTES: Performed by: STUDENT IN AN ORGANIZED HEALTH CARE EDUCATION/TRAINING PROGRAM

## 2022-09-30 PROCEDURE — 99900103 DSU ONLY-NO CHARGE-INITIAL HR (STAT): Performed by: STUDENT IN AN ORGANIZED HEALTH CARE EDUCATION/TRAINING PROGRAM

## 2022-09-30 PROCEDURE — C1769 GUIDE WIRE: HCPCS | Performed by: STUDENT IN AN ORGANIZED HEALTH CARE EDUCATION/TRAINING PROGRAM

## 2022-09-30 PROCEDURE — 71000015 HC POSTOP RECOV 1ST HR: Performed by: STUDENT IN AN ORGANIZED HEALTH CARE EDUCATION/TRAINING PROGRAM

## 2022-09-30 PROCEDURE — 36000706: Performed by: STUDENT IN AN ORGANIZED HEALTH CARE EDUCATION/TRAINING PROGRAM

## 2022-09-30 PROCEDURE — 93010 EKG 12-LEAD: ICD-10-PCS | Mod: ,,, | Performed by: INTERNAL MEDICINE

## 2022-09-30 PROCEDURE — 37000009 HC ANESTHESIA EA ADD 15 MINS: Performed by: STUDENT IN AN ORGANIZED HEALTH CARE EDUCATION/TRAINING PROGRAM

## 2022-09-30 PROCEDURE — D9220A PRA ANESTHESIA: ICD-10-PCS | Mod: CRNA,,, | Performed by: NURSE ANESTHETIST, CERTIFIED REGISTERED

## 2022-09-30 PROCEDURE — 71000033 HC RECOVERY, INTIAL HOUR: Performed by: STUDENT IN AN ORGANIZED HEALTH CARE EDUCATION/TRAINING PROGRAM

## 2022-09-30 PROCEDURE — 52005 CYSTO W/URTRL CATHJ: CPT | Mod: RT,,, | Performed by: STUDENT IN AN ORGANIZED HEALTH CARE EDUCATION/TRAINING PROGRAM

## 2022-09-30 PROCEDURE — 93005 ELECTROCARDIOGRAM TRACING: CPT | Mod: 59

## 2022-09-30 PROCEDURE — 63600175 PHARM REV CODE 636 W HCPCS: Performed by: STUDENT IN AN ORGANIZED HEALTH CARE EDUCATION/TRAINING PROGRAM

## 2022-09-30 PROCEDURE — D9220A PRA ANESTHESIA: ICD-10-PCS | Mod: ANES,,, | Performed by: ANESTHESIOLOGY

## 2022-09-30 PROCEDURE — 52005 PR CYSTOURETHROSCOPY,URETER CATHETER: ICD-10-PCS | Mod: RT,,, | Performed by: STUDENT IN AN ORGANIZED HEALTH CARE EDUCATION/TRAINING PROGRAM

## 2022-09-30 PROCEDURE — 25500020 PHARM REV CODE 255: Performed by: STUDENT IN AN ORGANIZED HEALTH CARE EDUCATION/TRAINING PROGRAM

## 2022-09-30 PROCEDURE — 27200651 HC AIRWAY, LMA: Performed by: ANESTHESIOLOGY

## 2022-09-30 PROCEDURE — D9220A PRA ANESTHESIA: Mod: ANES,,, | Performed by: ANESTHESIOLOGY

## 2022-09-30 PROCEDURE — 74420 PR  X-RAY RETROGRADE PYELOGRAM: ICD-10-PCS | Mod: 26,,, | Performed by: STUDENT IN AN ORGANIZED HEALTH CARE EDUCATION/TRAINING PROGRAM

## 2022-09-30 PROCEDURE — 99900104 DSU ONLY-NO CHARGE-EA ADD'L HR (STAT): Performed by: STUDENT IN AN ORGANIZED HEALTH CARE EDUCATION/TRAINING PROGRAM

## 2022-09-30 PROCEDURE — 74420 UROGRAPHY RTRGR +-KUB: CPT | Mod: 26,,, | Performed by: STUDENT IN AN ORGANIZED HEALTH CARE EDUCATION/TRAINING PROGRAM

## 2022-09-30 RX ORDER — OXYCODONE HYDROCHLORIDE 5 MG/1
5 TABLET ORAL
Status: DISCONTINUED | OUTPATIENT
Start: 2022-09-30 | End: 2022-09-30 | Stop reason: HOSPADM

## 2022-09-30 RX ORDER — MIDAZOLAM HYDROCHLORIDE 1 MG/ML
INJECTION, SOLUTION INTRAMUSCULAR; INTRAVENOUS
Status: DISCONTINUED | OUTPATIENT
Start: 2022-09-30 | End: 2022-09-30

## 2022-09-30 RX ORDER — CEFAZOLIN SODIUM 2 G/50ML
2 SOLUTION INTRAVENOUS
Status: COMPLETED | OUTPATIENT
Start: 2022-09-30 | End: 2022-09-30

## 2022-09-30 RX ORDER — SCOLOPAMINE TRANSDERMAL SYSTEM 1 MG/1
1 PATCH, EXTENDED RELEASE TRANSDERMAL
Status: DISCONTINUED | OUTPATIENT
Start: 2022-09-30 | End: 2022-09-30 | Stop reason: HOSPADM

## 2022-09-30 RX ORDER — PROPOFOL 10 MG/ML
VIAL (ML) INTRAVENOUS
Status: DISCONTINUED | OUTPATIENT
Start: 2022-09-30 | End: 2022-09-30

## 2022-09-30 RX ORDER — PHENAZOPYRIDINE HYDROCHLORIDE 100 MG/1
100 TABLET, FILM COATED ORAL 3 TIMES DAILY PRN
Qty: 21 TABLET | Refills: 0 | Status: SHIPPED | OUTPATIENT
Start: 2022-09-30 | End: 2022-10-10

## 2022-09-30 RX ORDER — LIDOCAINE HYDROCHLORIDE 20 MG/ML
INJECTION INTRAVENOUS
Status: DISCONTINUED | OUTPATIENT
Start: 2022-09-30 | End: 2022-09-30

## 2022-09-30 RX ORDER — HYDROCODONE BITARTRATE AND ACETAMINOPHEN 5; 325 MG/1; MG/1
1 TABLET ORAL EVERY 4 HOURS PRN
Status: DISCONTINUED | OUTPATIENT
Start: 2022-09-30 | End: 2022-09-30 | Stop reason: HOSPADM

## 2022-09-30 RX ORDER — LIDOCAINE HYDROCHLORIDE 10 MG/ML
1 INJECTION, SOLUTION EPIDURAL; INFILTRATION; INTRACAUDAL; PERINEURAL ONCE
Status: COMPLETED | OUTPATIENT
Start: 2022-09-30 | End: 2022-09-30

## 2022-09-30 RX ORDER — METOCLOPRAMIDE HYDROCHLORIDE 5 MG/ML
10 INJECTION INTRAMUSCULAR; INTRAVENOUS EVERY 10 MIN PRN
Status: DISCONTINUED | OUTPATIENT
Start: 2022-09-30 | End: 2022-09-30 | Stop reason: HOSPADM

## 2022-09-30 RX ORDER — FENTANYL CITRATE 50 UG/ML
INJECTION, SOLUTION INTRAMUSCULAR; INTRAVENOUS
Status: DISCONTINUED | OUTPATIENT
Start: 2022-09-30 | End: 2022-09-30

## 2022-09-30 RX ORDER — FENTANYL CITRATE 50 UG/ML
25 INJECTION, SOLUTION INTRAMUSCULAR; INTRAVENOUS EVERY 5 MIN PRN
Status: DISCONTINUED | OUTPATIENT
Start: 2022-09-30 | End: 2022-09-30 | Stop reason: HOSPADM

## 2022-09-30 RX ADMIN — LIDOCAINE HYDROCHLORIDE 10 MG: 10 INJECTION, SOLUTION EPIDURAL; INFILTRATION; INTRACAUDAL; PERINEURAL at 07:09

## 2022-09-30 RX ADMIN — MIDAZOLAM 2 MG: 1 INJECTION INTRAMUSCULAR; INTRAVENOUS at 09:09

## 2022-09-30 RX ADMIN — LIDOCAINE HYDROCHLORIDE 100 MG: 20 INJECTION, SOLUTION INTRAVENOUS at 10:09

## 2022-09-30 RX ADMIN — CEFAZOLIN SODIUM 2 G: 2 SOLUTION INTRAVENOUS at 10:09

## 2022-09-30 RX ADMIN — SCOPALAMINE 1 PATCH: 1 PATCH, EXTENDED RELEASE TRANSDERMAL at 08:09

## 2022-09-30 RX ADMIN — SODIUM CHLORIDE, SODIUM GLUCONATE, SODIUM ACETATE, POTASSIUM CHLORIDE, MAGNESIUM CHLORIDE, SODIUM PHOSPHATE, DIBASIC, AND POTASSIUM PHOSPHATE: .53; .5; .37; .037; .03; .012; .00082 INJECTION, SOLUTION INTRAVENOUS at 07:09

## 2022-09-30 RX ADMIN — SODIUM CHLORIDE, SODIUM GLUCONATE, SODIUM ACETATE, POTASSIUM CHLORIDE, MAGNESIUM CHLORIDE, SODIUM PHOSPHATE, DIBASIC, AND POTASSIUM PHOSPHATE: .53; .5; .37; .037; .03; .012; .00082 INJECTION, SOLUTION INTRAVENOUS at 10:09

## 2022-09-30 RX ADMIN — PROPOFOL 150 MG: 10 INJECTION, EMULSION INTRAVENOUS at 10:09

## 2022-09-30 RX ADMIN — FENTANYL CITRATE 100 MCG: 50 INJECTION, SOLUTION INTRAMUSCULAR; INTRAVENOUS at 10:09

## 2022-09-30 NOTE — PLAN OF CARE
Patient received with spouse, Estonian speaking family with eun offered and used for consents. Plan of care reviewed and warm blanket provided

## 2022-09-30 NOTE — PLAN OF CARE
Patient cleared per anesthesia protocol. NAD noted. Safety intact.  Cj ([id number]) used with SARATH to address patient questions and provide instructions to void before discharge, verbalized understanding. No c/o or concerns voiced by patient at this time.

## 2022-09-30 NOTE — ANESTHESIA PREPROCEDURE EVALUATION
09/30/2022  Lorraine Warner is a 67 y.o., female.      Pre-op Assessment    I have reviewed the Patient Summary Reports.     I have reviewed the Nursing Notes. I have reviewed the NPO Status.   I have reviewed the Medications.     Review of Systems  Anesthesia Hx:  No problems with previous Anesthesia    Social:  Non-Smoker    Cardiovascular:   Hypertension, well controlled CAD asymptomatic CABG/stent  hyperlipidemia    Pulmonary:  Pulmonary Normal    Renal/:  Renal/ Normal     Hepatic/GI:   GERD, well controlled    Neurological:  Neurology Normal    Endocrine:  Endocrine Normal        Physical Exam  General: Well nourished, Cooperative, Alert and Oriented    Airway:  Mallampati: II   Mouth Opening: Normal  TM Distance: Normal  Neck ROM: Normal ROM        Anesthesia Plan  Type of Anesthesia, risks & benefits discussed:    Anesthesia Type: Gen ETT, Gen Supraglottic Airway, Gen Natural Airway, MAC  Intra-op Monitoring Plan: Standard ASA Monitors  Post Op Pain Control Plan: multimodal analgesia  Induction:  IV  Airway Plan: Direct, Video and Fiberoptic, Post-Induction  Informed Consent: Informed consent signed with the Patient and all parties understand the risks and agree with anesthesia plan.  All questions answered.   ASA Score: 3  Anesthesia Plan Notes: H&P & Consent with Pt,  and Donnie.    Ready For Surgery From Anesthesia Perspective.     .

## 2022-09-30 NOTE — DISCHARGE SUMMARY
[facility]  Discharge Note  Short Stay    Admit Date: 9/30/2022    Discharge Date and Time: 09/30/2022 10:35 AM      Attending Physician: Giovana Kennedy MD     Discharge Provider: Giovana Kennedy    Diagnoses:  Active Hospital Problems    Diagnosis  POA    *Hydronephrosis, right [N13.30]  Unknown    Disease of biliary tract [K83.9]  Yes    Gastrointestinal stromal tumor (GIST) of stomach [C49.A2]  Yes      Resolved Hospital Problems   No resolved problems to display.       Discharged Condition: good    Hospital Course: Patient was admitted for outpatient procedure and tolerated the procedure well with no complications. The patient was discharged home in good condition on the same day.       Final Diagnoses: Same as principal problem.    Disposition: Home or Self Care    Follow up/Patient Instructions:    Medications:  Reconciled Home Medications:   Current Discharge Medication List        START taking these medications    Details   phenazopyridine (PYRIDIUM) 100 MG tablet Take 1 tablet (100 mg total) by mouth 3 (three) times daily as needed.  Qty: 21 tablet, Refills: 0           CONTINUE these medications which have NOT CHANGED    Details   aspirin (ECOTRIN) 81 MG EC tablet Take 81 mg by mouth once daily.      atorvastatin (LIPITOR) 40 MG tablet Take 40 mg by mouth every evening.       cholecalciferol, vitamin D3, (VITAMIN D3) 50 mcg (2,000 unit) Cap Take 1 capsule by mouth once daily.      metoprolol succinate (TOPROL-XL) 25 MG 24 hr tablet Take 25 mg by mouth every evening.       pantoprazole (PROTONIX) 40 MG tablet Take 40 mg by mouth once daily.      potassium chloride (MICRO-K) 10 MEQ CpSR Take 10 mEq by mouth every morning.            Discharge Procedure Orders   NM Renal Scan with LASIX   Standing Status: Future Standing Exp. Date: 09/30/23     Order Specific Question Answer Comments   May the Radiologist modify the order per protocol to meet the clinical needs of the patient? Yes      Diet  general     Call MD for:  temperature >100.4     Call MD for:  persistent nausea and vomiting     Call MD for:  severe uncontrolled pain     No dressing needed     Activity as tolerated      Follow-up Information       Giovana Kennedy MD Follow up in 1 month(s).    Specialty: Urology  Why: with MAG 3 renal scan  Contact information:  [address] [place] [address] [address]IVE  [id number]  [facility] [address]  [phone]                             Giovana Kennedy MD  Urology Department

## 2022-09-30 NOTE — OP NOTE
[facility]  Operative Note    Date: 09/30/2022    Pre-Op Diagnosis:   - Right hydronephrosis vs parapelvic cyst     Patient Active Problem List   Diagnosis    Abnormal computed tomography of stomach    Mass of stomach - cardia    Abnormal findings on esophagogastroduodenoscopy (EGD)    Gastrointestinal stromal tumor (GIST) of stomach    Disease of biliary tract    Hydronephrosis, right       Post-Op Diagnosis: right UPJ obstruction     Procedure(s) Performed:   1.  Cystoscopy with right retrograde pyelogram  2.  Fluoroscopy < 1 hour    Specimen(s): none    Staff Surgeon:  Giovana Kennedy MD    Anesthesia: General LMA anesthesia    Indications: Lorraine Warner is a 67 y.o. female with right hydro vs parapelvic cysts on imaging. She presents for RGP.     Findings:   - grade 2 cystocele   - mild right hydronephrosis to the level of the UPJ  - no filing defects   - slow drainage of right renal collecting system after 10 mins     Estimated Blood Loss: min    Drains: none    Procedure in Detail:  After risks, benefits and possible complications of cystoscopy were explained, the patient elected to undergo the procedure and informed consent was obtained. All questions were answered in the emilia-operative area. The patient was transferred to the cystoscopy suite and placed in the supine position.  SCDs were applied and working. Anesthesia was administered.  Once the patient was adequately sedated, she was placed in the dorsal lithotomy position and prepped and draped in the usual sterile fashion.  Time out was performed, emilia-procedural antibiotics were confirmed.     A rigid cystoscope in a 22 Fr sheath was introduced into the bladder per urethra. This passed easily.  The entire urethra was visualized which showed no masses or strictures.  The right and left ureteral orifices were identified in the normal anatomic position and were seen effluxing clear urine.  Formal cystoscopy was performed which  revealed no masses or lesions suspicious for malignancy, no trabeculations, no bladder stones and no bladder diverticuli.      The right UO was identified and cannulated with a 5 fr open tip catheter.  A RGP was performed which showed no dilation of the ureter, there was mild dilation of the renal pelvis and calyces to the level of the UPJ. No filling defects. After 10 mins, there was delayed drainage from the right kidney.      The bladder was drained, and the patient was removed from lithotomy.     The patient tolerated the procedure well and was transferred to recovery in stable condition.    Disposition:  The patient will follow up in 1 month with a MAG 3 renal scan.     Giovana Kennedy MD

## 2022-09-30 NOTE — ANESTHESIA PROCEDURE NOTES
Intubation    Date/Time: 9/30/2022 10:11 AM  Performed by: Dmitry Jimenez CRNA  Authorized by: Rishabh Joseph MD     Intubation:     Induction:  Intravenous    Intubated:  Postinduction    Mask Ventilation:  Easy mask    Attempts:  1    Attempted By:  CRNA    Difficult Airway Encountered?: No      Complications:  None    Airway Device:  Supraglottic airway/LMA    Airway Device Size:  3.0    Secured at:  The lips    Placement Verified By:  Capnometry    Findings Post-Intubation:  BS equal bilateral and atraumatic/condition of teeth unchanged

## 2022-09-30 NOTE — PLAN OF CARE
Discharge instructions given to pt and spouse via  #[id number].  All questions answered, both voice understanding.  Denies pain, tolerating po fluids.  Voiding without difficulty.  All belongings with pt

## 2022-09-30 NOTE — TRANSFER OF CARE
"Anesthesia Transfer of Care Note    Patient: Lorraine Warner    Procedure(s) Performed: Procedure(s) (LRB):  CYSTOSCOPY, WITH RETROGRADE PYELOGRAM (Right)    Anesthesia PACU Handoff    Last vitals:   Visit Vitals  BP (!) 172/80 (BP Location: Left arm, Patient Position: Lying)   Pulse 72   Temp 36.3 °C (97.4 °F) (Temporal)   Resp 17   Ht 5' 3" (1.6 m)   Wt 63.5 kg (140 lb)   SpO2 98%   Breastfeeding No   BMI 24.80 kg/m²     "

## 2022-09-30 NOTE — DISCHARGE INSTRUCTIONS
"Discharge Instructions: After Your Surgery/Procedure  Youve just had surgery. During surgery you were given medicine called anesthesia to keep you relaxed and free of pain. After surgery you may have some pain or nausea. This is common. Here are some tips for feeling better and getting well after surgery.     Stay on schedule with your medication.   Going home  Your doctor or nurse will show you how to take care of yourself when you go home. He or she will also answer your questions. Have an adult family member or friend drive you home.      For your safety we recommend these precaution for the first 24 hours after your procedure:  Do not drive or use heavy equipment.  Do not make important decisions or sign legal papers.  Do not drink alcohol.  Have someone stay with you, if needed. He or she can watch for problems and help keep you safe.  Your concentration, balance, coordination, and judgement may be impaired for many hours after anesthesia.  Use caution when ambulating or standing up.     You may feel weak and "washed out" after anesthesia and surgery.      Subtle residual effects of general anesthesia or sedation with regional / local anesthesia can last more than 24 hours.  Rest for the remainder of the day or longer if your Doctor/Surgeon has advised you to do so.  Although you may feel normal within the first 24 hours, your reflexes and mental ability may be impaired without you realizing it.  You may feel dizzy, lightheaded or sleepy for 24 hours or longer.      Be sure to go to all follow-up visits with your doctor. And rest after your surgery for as long as your doctor tells you to.  Coping with pain  If you have pain after surgery, pain medicine will help you feel better. Take it as told, before pain becomes severe. Also, ask your doctor or pharmacist about other ways to control pain. This might be with heat, ice, or relaxation. And follow any other instructions your surgeon or nurse gives you.  Tips " for taking pain medicine  To get the best relief possible, remember these points:  Pain medicines can upset your stomach. Taking them with a little food may help.  Most pain relievers taken by mouth need at least 20 to 30 minutes to start to work.  Taking medicine on a schedule can help you remember to take it. Try to time your medicine so that you can take it before starting an activity. This might be before you get dressed, go for a walk, or sit down for dinner.  Constipation is a common side effect of pain medicines. Call your doctor before taking any medicines such as laxatives or stool softeners to help ease constipation. Also ask if you should skip any foods. Drinking lots of fluids and eating foods such as fruits and vegetables that are high in fiber can also help. Remember, do not take laxatives unless your surgeon has prescribed them.  Drinking alcohol and taking pain medicine can cause dizziness and slow your breathing. It can even be deadly. Do not drink alcohol while taking pain medicine.  Pain medicine can make you react more slowly to things. Do not drive or run machinery while taking pain medicine.  Your health care provider may tell you to take acetaminophen to help ease your pain. Ask him or her how much you are supposed to take each day. Acetaminophen or other pain relievers may interact with your prescription medicines or other over-the-counter (OTC) drugs. Some prescription medicines have acetaminophen and other ingredients. Using both prescription and OTC acetaminophen for pain can cause you to overdose. Read the labels on your OTC medicines with care. This will help you to clearly know the list of ingredients, how much to take, and any warnings. It may also help you not take too much acetaminophen. If you have questions or do not understand the information, ask your pharmacist or health care provider to explain it to you before you take the OTC medicine.  Managing nausea  Some people have an  upset stomach after surgery. This is often because of anesthesia, pain, or pain medicine, or the stress of surgery. These tips will help you handle nausea and eat healthy foods as you get better. If you were on a special food plan before surgery, ask your doctor if you should follow it while you get better. These tips may help:  Do not push yourself to eat. Your body will tell you when to eat and how much.  Start off with clear liquids and soup. They are easier to digest.  Next try semi-solid foods, such as mashed potatoes, applesauce, and gelatin, as you feel ready.  Slowly move to solid foods. Dont eat fatty, rich, or spicy foods at first.  Do not force yourself to have 3 large meals a day. Instead eat smaller amounts more often.  Take pain medicines with a small amount of solid food, such as crackers or toast, to avoid nausea.     Call your surgeon if  You still have pain an hour after taking medicine. The medicine may not be strong enough.  You feel too sleepy, dizzy, or groggy. The medicine may be too strong.  You have side effects like nausea, vomiting, or skin changes, such as rash, itching, or hives.       If you have obstructive sleep apnea  You were given anesthesia medicine during surgery to keep you comfortable and free of pain. After surgery, you may have more apnea spells because of this medicine and other medicines you were given. The spells may last longer than usual.   At home:  Keep using the continuous positive airway pressure (CPAP) device when you sleep. Unless your health care provider tells you not to, use it when you sleep, day or night. CPAP is a common device used to treat obstructive sleep apnea.  Talk with your provider before taking any pain medicine, muscle relaxants, or sedatives. Your provider will tell you about the possible dangers of taking these medicines.  © 2067-1198 [employer]. [address] [place] [address], [place] [address]. All rights reserved. This information is  not intended as a substitute for professional medical care. Always follow your healthcare professional's instructions.       Post op instructions for prevention of DVT  What is deep vein thrombosis?  Deep vein thrombosis (DVT) is the medical term for blood clots in the deep veins of the leg.  These blood clots can be dangerous.  A DVT can block a blood vessel and keep blood from getting where it needs to go.  Another problem is that the clot can travel to other parts of the body such as the lungs.  A clot that travels to the lungs is called a pulmonary embolus (PE) and can cause serious problems with breathing which can lead to death.  Am I at risk for DVT/PE?  If you are not very active, you are at risk of DVT.  Anyone confined to bed, sitting for long periods of time, recovering from surgery, etc. increases the risk of DVT.  Other risk factors are cancer diagnosis, certain medications, estrogen replacement in any form,older age, obesity, pregnancy, smoking, history of clotting disorders, and dehydration.  How will I know if I have a DVT?  Swelling in the lower leg  Pain  Warmth, redness, hardness or bulging of the vein  If you have any of these symptoms, call your doctors office right away.  Some people will not have any symptoms until the clot moves to the lungs.  What are the symptoms of a PE?  Panting, shortness of breath, or trouble breathing  Sharp, knife-like chest pain when you breathe  Coughing or coughing up blood  Rapid heartbeat  If you have any of these symptoms or get worse quickly, call 911 for emergency treatment.  How can I prevent a DVT?  Avoid long periods of inactivity and dont cross your legs--get up and walk around every hour or so.  Stay active--walking after surgery is highly encouraged.  This means you should get out of the house and walk in the neighborhood.  Going up and down stairs will not impair healing (unless advised against such activity by your doctor).    Drink plenty of  noncaffeinated, nonalcoholic fluids each day to prevent dehydration.  Wear special support stockings, if they have been advised by your doctor.  If you travel, stop at least once an hour and walk around.  Avoid smoking (assistance with stopping is available through your healthcare provider)  Always notify your doctor if you are not able to follow the post operative instructions that are given to you at the time of discharge.  It may be necessary to prescribe one of the medications available to prevent DVT.     Using an Incentive Spirometer    An incentive spirometer is a device that helps you do deep breathing exercises. These exercises expand your lungs, aid in circulation, and help prevent pneumonia. Deep breathing exercises also help you breathe better and improve the function of your lungs by:  Keeping your lungs clear  Strengthening your breathing muscles  Helping prevent respiratory complications or problems  The incentive spirometer gives you a way to take an active part in recover. A nurse or therapist will teach you breathing exercises. To do these exercises, you will breathe in through your mouth and not your nose. The incentive spirometer only works correctly if you breathe in through your mouth.  Steps to clear lungs  Step 1. Exhale normally. Then, inhale normally.  Relax and breathe out.  Step 2. Place your lips tightly around the mouthpiece.  Make sure the device is upright and not tilted.  Step 3. Inhale as much air as you can through the mouthpiece (don't breath through your nose).  Inhale slowly and deeply.  Hold your breath long enough to keep the balls or disk raised for at least 3 to 5 seconds, or as instructed by your healthcare provider.  Some spirometers have an indicator to let you know that you are breathing in too fast. If the indicator goes off, breathe in more slowly.  Step 4. Repeat the exercise regularly.  Do this exercise every hour while you're awake, or as instructed by your  healthcare provider.  If you were taught deep breathing and coughing exercises, do them regularly as instructed by your healthcare provider.

## 2022-09-30 NOTE — ANESTHESIA POSTPROCEDURE EVALUATION
Anesthesia Post Evaluation    Patient: Lorraine Warner    Procedure(s) Performed: Procedure(s) (LRB):  CYSTOSCOPY, WITH RETROGRADE PYELOGRAM (Right)    Final Anesthesia Type: general      Patient location during evaluation: PACU  Patient participation: Yes- Able to Participate  Level of consciousness: awake and alert and oriented  Post-procedure vital signs: reviewed and stable  Pain management: adequate  Airway patency: patent    PONV status at discharge: No PONV  Anesthetic complications: no      Cardiovascular status: blood pressure returned to baseline and stable  Respiratory status: unassisted and spontaneous ventilation  Hydration status: euvolemic  Follow-up not needed.          Vitals Value Taken Time   /71 09/30/22 1230   Temp 36.8 °C (98.2 °F) 09/30/22 1145   Pulse 61 09/30/22 1230   Resp 14 09/30/22 1230   SpO2 99 % 09/30/22 1230         Event Time   Out of Recovery 11:52:00         Pain/Mily Score: Mily Score: 10 (9/30/2022 11:45 AM)  Modified Mily Score: 20 (9/30/2022 12:30 PM)

## 2022-10-03 VITALS
RESPIRATION RATE: 14 BRPM | TEMPERATURE: 98 F | OXYGEN SATURATION: 99 % | HEART RATE: 61 BPM | SYSTOLIC BLOOD PRESSURE: 169 MMHG | BODY MASS INDEX: 24.8 KG/M2 | HEIGHT: 63 IN | WEIGHT: 140 LBS | DIASTOLIC BLOOD PRESSURE: 71 MMHG

## 2022-10-10 NOTE — PROGRESS NOTES
[facility]  PROGRESS NOTE -  Follow-up Visit      Subjective:       Patient ID:   NAME: Lorraine Warner : 1955     67 y.o. female    Referring Doc: Judith  Other Physicians: Azeb Kumar    Chief Complaint:  gastric submucosal nodule/GIST f/u        History of Present Illness:     Patient returns today for a  regularly scheduled follow-up visit.  The patient is here today to go over the results of the recently ordered labs, tests and studies. She is here with her . present    She had scope on 10/7/2022 with Dr Asif - looks like they just found a small ulcer 3mm at GE junction and some erythema in gastric    She had CT scans on 2022 with no evidence of residual or recurrent cancer but she has some right sided hydronephrosis.     She saw Dr Kennedy with   and she had a cystoscope on      She previously had seen Dr Garcia with Gen Surgery and had subsequent resection of the mass on 2018.       She is otherwise doing ok with no new issues.   She denies any CP, SOB, HA's or N/V.        Discussed covid19 precautions - she has not been vaccinated; she had covid in Dec 2021 and recovered      ROS:   GEN: normal without any fever, night sweats or weight loss  HEENT: normal with no HA's, sore throat, stiff neck, changes in vision  CV: normal with no CP, SOB, PND, MARSHALL or orthopnea  PULM: normal with no SOB, cough, hemoptysis, sputum or pleuritic pain  GI: normal with no abdominal pain, nausea, vomiting, constipation, diarrhea, melanotic stools, BRBPR, or hematemesis; some reflux but now better  : normal with no hematuria, dysuria  BREAST: normal with no mass, discharge, pain  SKIN: normal with no rash, erythema, bruising, or swelling    Allergies:  Review of patient's allergies indicates:  No Known Allergies    Medications:    Current Outpatient Medications:     aspirin (ECOTRIN) 81 MG EC tablet, Take 81 mg by mouth once daily., Disp: , Rfl:      atorvastatin (LIPITOR) 40 MG tablet, Take 40 mg by mouth every evening. , Disp: , Rfl:     cholecalciferol, vitamin D3, (VITAMIN D3) 50 mcg (2,000 unit) Cap, Take 1 capsule by mouth once daily., Disp: , Rfl:     metoprolol succinate (TOPROL-XL) 25 MG 24 hr tablet, Take 25 mg by mouth every evening. , Disp: , Rfl:     pantoprazole (PROTONIX) 40 MG tablet, Take 40 mg by mouth once daily., Disp: , Rfl:     potassium chloride (MICRO-K) 10 MEQ CpSR, Take 10 mEq by mouth every morning. , Disp: , Rfl:     PMHx/PSHx Updates:  See patient's last visit with me on 7/27/2022  See H&P on 10/25/2018        Pathology:  Cancer Staging      Gastric Wedge Resection: 11/27/2018:    FINAL DIAGNOSIS:  POSTERIOR STOMACH, WEDGE RESECTION:   GASTROINTESTINAL STROMAL TUMOR (GIST), MIXED SPINDLE CELL AND  EPITHELIOID TYPE.    2.5 CM IN GREATEST DIMENSIONS.    THE SHAVED SURGICAL MARGIN OF RESECTION IS FREE OF TUMOR.    THE TUMOR FOCALLY EXTENDS TO THE SEROSAL SURFACE.  pT2NX, (Stage IA).  G1: Low Grade with mitotic rate less than or equal to 5/5mm2  KIT ():     Positive.   DOG-1:     Positive    RISK ASSESSMENT:   Very low risk (1.9%), Based on the strict criteria of mitotic count  and size of tumor. A moderate risk cannot be entirely   excluded.    Objective:     Vitals:  Blood pressure (!) 160/73, pulse 68, temperature 98.2 °F (36.8 °C), resp. rate 16, weight 67 kg (147 lb 12.8 oz).    Physical Examination:   GEN: no apparent distress, comfortable; AAOx3  HEAD: atraumatic and normocephalic  EYES: no pallor, no icterus, PERRLA  ENT: OMM, no pharyngeal erythema, external ears WNL; no nasal discharge; no thrush  NECK: no masses, thyroid normal, trachea midline, no LAD/LN's, supple  CV: RRR with no murmur; normal pulse; normal S1 and S2; no pedal edema  CHEST: Normal respiratory effort; CTAB; normal breath sounds; no wheeze or crackles  ABDOM: nontender and nondistended; soft; normal bowel sounds; no rebound/guarding  MUSC/Skeletal: ROM  normal; no crepitus; joints normal; no deformities or arthropathy  EXTREM: no clubbing, cyanosis, inflammation or swelling  SKIN: no rashes, lesions, ulcers, petechiae or subcutaneous nodules  : no amaro  NEURO: grossly intact; motor/sensory WNL; AAOx3; no tremors  PSYCH: normal mood, affect and behavior  LYMPH: normal cervical, supraclavicular, axillary and groin LN's            Labs:      Lab Results   Component Value Date    WBC 6.02 09/01/2022    HGB 13.5 09/01/2022    HCT 40.8 09/01/2022    MCV 94 09/01/2022     09/01/2022     CMP  Sodium   Date Value Ref Range Status   09/01/2022 140 136 - 145 mmol/L Final   01/17/2019 138 134 - 144 mmol/L      Potassium   Date Value Ref Range Status   09/01/2022 3.6 3.5 - 5.1 mmol/L Final     Chloride   Date Value Ref Range Status   09/01/2022 109 95 - 110 mmol/L Final   01/17/2019 98 98 - 110 mmol/L      CO2   Date Value Ref Range Status   09/01/2022 26 23 - 29 mmol/L Final     Glucose   Date Value Ref Range Status   09/01/2022 105 70 - 110 mg/dL Final   01/17/2019 114 (H) 70 - 99 mg/dL      BUN   Date Value Ref Range Status   09/01/2022 19 8 - 23 mg/dL Final     Creatinine   Date Value Ref Range Status   09/01/2022 1.2 0.5 - 1.4 mg/dL Final   01/17/2019 1.08 0.60 - 1.40 mg/dL      Calcium   Date Value Ref Range Status   09/01/2022 9.5 8.7 - 10.5 mg/dL Final     Total Protein   Date Value Ref Range Status   07/27/2022 7.2 6.0 - 8.4 g/dL Final     Albumin   Date Value Ref Range Status   09/01/2022 4.0 3.5 - 5.2 g/dL Final   01/17/2019 4.2 3.1 - 4.7 g/dL      Total Bilirubin   Date Value Ref Range Status   07/27/2022 0.6 0.1 - 1.0 mg/dL Final     Comment:     For infants and newborns, interpretation of results should be based  on gestational age, weight and in agreement with clinical  observations.    Premature Infant recommended reference ranges:  Up to 24 hours.............<8.0 mg/dL  Up to 48 hours............<12.0 mg/dL  3-5 days..................<15.0 mg/dL  6-29  days.................<15.0 mg/dL       Alkaline Phosphatase   Date Value Ref Range Status   07/27/2022 98 55 - 135 U/L Final     AST   Date Value Ref Range Status   07/27/2022 21 10 - 40 U/L Final     ALT   Date Value Ref Range Status   07/27/2022 18 10 - 44 U/L Final     Anion Gap   Date Value Ref Range Status   09/01/2022 5 (L) 8 - 16 mmol/L Final     eGFR if    Date Value Ref Range Status   07/27/2022 59.2 (A) >60 mL/min/1.73 m^2 Final     eGFR if non    Date Value Ref Range Status   07/27/2022 51.3 (A) >60 mL/min/1.73 m^2 Final     Comment:     Calculation used to obtain the estimated glomerular filtration  rate (eGFR) is the CKD-EPI equation.                Radiology/Diagnostic Studies:      CT 9/6/2022:    IMPRESSION:     1. Negative for recurrent malignancy or metastatic disease, with unchanged postoperative changes of proximal stomach.  2. Moderate right hydronephrosis, of undetermined etiology and unchanged. Although renal sinus cysts could give a similar appearance, and the current appearance appears more indicative of hydronephrosis of undetermined etiology. Further imaging evaluation is recommended with CT urogram without and with IV contrast to assess for potential etiology and definitively differentiate renal sinus cysts from hydronephrosis.        CT abdom/pelvis  9/7/2021:      IMPRESSION:  1. Postoperative changes of prior GIST tumor resection of the cardia of the stomach, with no lymphadenopathy or CT finding of residual/recurrent disease.  2. No acute abdominal or pelvic abnormality.          US  abdom  7/28/2021:  IMPRESSION:  Dilated common duct reaching diameter of 8 mm. Further evaluation with CT abdomen and pelvis with IV contrast and MRCP are recommended.         CT abdom/pelvis  9/2/2020:    IMPRESSION:     1.  Status post resection of gastric GIST tumor. No recurrent tumor  or regional soft tissue abnormality identified.  2.  No other acute changes when  compared previous exam.         PET  7/29/2019:  Impression       No FDG PET/CT findings to suggest recurrent or metastatic disease.             Chest CT 4/24/2019    IMPRESSION:  1. No acute abnormality seen.  2. The 2 lung nodules seen on CT PET scan dated 01/29/2019 represent calcified  granulomas.  3. There are several small perifissural lymph nodes in the right lung measuring  5 mm or less in diameter.  4. There  is no evidence of lung metastasis.  5. No change in the sclerotic focus in the right scapula described on the  previous PET scan and increases the probability of it representing a benign  lesion.        PET 1/15/2019:  IMPRESSION:    1. No current convincing evidence for residual malignancy or metastatic disease.    2. Incidental findings tiny noncalcified left lung nodules and sclerotic focus  in right scapula. Benign etiologies are favored, although metastatic disease is  conceivable but felt less likely. CT thorax without IV contrast follow-up is  recommended in 3 months to begin to document prolonged stability if imaging  follow-up suggested for at least 2 years. If old outside CTs of the chest are  available to document prolonged stability and these are made available,  addendum can be issued at that time.    3. Focus of FDG uptake along deep aspect of umbilicus without CT correlate is  likely inflammatory in nature and related to recent surgery.        I have reviewed all available lab results and radiology reports.    Assessment/Plan:   (1) 67 y.o. female with diagnosis of a submucosal nodule of the gastric cardia who has been referred by Dr Yunior Wong with GI for evaluation by medical hematology/oncology.   - She had originally presented with abdominal pain and underwent EGD with Dr Wong on 10/19/2018.   - She had a CT scan on 10/11/2018 which showed a 2.7 x 1.6cm soft tissue mass in the cardia of the stomach.   - on EGD, he found a submucosal nodule in the cardia of the stomach.   -  "Pathology from the initial biopsy seems to have been nondiagnostic.   - she saw Dr Garcia with Gen Surg and underwent a wedge resection on 11/27/2018 with the pathology ultimately showing GIST  - G1: Low Grade; pT2NX, (Stage IA).  - upon review of the latest guidelines, she should not need any adjuvant therapy with the drug Gleevec due to the low-grade nature of her GIST  - "According to pathologist, her tumor was 2.5cm but was low grade with mitotic rate < or = to 5/50 hpf  - her risk assessment as a result is very low risk at 1.9% risk on metastasis"   - as a result, based on the latest NCCN guidelines (v. 1.2019) I would recommend surveillance only and she should not need Gleevec therapy at this time    - she had PEt on 7/29/2019 7/21/2020:  - She last saw Dr Wong about 4 months ago and had repeat scope which was "good" per patient   - she is due for repeat labs and scnas    1/21/2021:  - some residual reflux symptoms but she has not seen Dr Wong in over a year  - scans in Sept 2020 on chart and good  - repeat scans again in Sept 2021 7/29/2021:  - check up to date labs  - CT scheduled for Sept 7th 2021  - she needs to f/u with Dr Wong with GI for the reflux sx's  - recent US done with Dr mustafa with some dilation of the bile duct    1/27/2022:  - she had CT scan in Sept 2021 which were stable with no evidence of recurrent cancer  - she has some residual reflux which is controlled with prilosec  - she has not follow-up with Dr Wong recently and I encouraged her to do so      7/27/2022:  - she is doing ok with no new issues  - I have recommended repeat CT scans in Sept 2022  - she needs up to date labs  - I recommend and encouraged her to f/u with GI for repeat endoscopies    10/11/2022:  - She had scope on 10/7/2022 with Dr Asif - looks like they just found a small ulcer 3mm at GE junction and some erythema in gastric  - She had CT scans on 9/6/2022 with no evidence of residual or recurrent " "cancer but she has some right sided hydronephrosis.   - She saw Dr Kennedy with   and she had a cystoscope on 9/30  -  is expecting to see her again in one month    (2) HTN and hypercholesterolemia     (3) Hernia     (4) Chronic gastritis  -    (5) Low potassium     (6) S/p prior bladder drainage issue          VISIT DIAGNOSES:      Gastrointestinal stromal tumor (GIST) of stomach    Abnormal findings on esophagogastroduodenoscopy (EGD)    Abnormal computed tomography of stomach        PLAN:  1. F/u with GI as directed  2. No indication for Gleevec at this time  3. F/u with PCP, GI, Card, Gen Surg,  etc  4.  repeat scan in Sept 2023 or sooner if  directs so  5. RTC in  6 months    Fax note to Yefri Banks; Azeb Almeida: Brent    Discussion:     Pathology Discussion:    I reviewed and discussed the pathology report(s) and radiograph reports (if available) in as simple to understand and/or laymen's terms to the best of my ability. I had an indepth conversation with the patient and went over the patient's individual diagnosis based on the information that was currently available. I discussed the TNM staging process with regard to the patient's particular cancer type, and the calculated stage based on the currently available TNM data and literature. I discussed the available prognostic data with regard to the current staging information and how it relates to the prognosis of their particular neoplastic process.          NCCN Guidelines:    I discussed the available treatment option(s) in accordance with the latest literature from the NCCN Clinical Practice Guidelines for the patient's particular type of cancer disorder. The NCCN Guidelines provide a "document evidence-based (and) consensus-driven management" of the care of oncology patients. The treatment recommendations were made not only in accordance to the NCCN guidelines, but also factored in to account the patient's overall age, " condition, performance status and their medical co-morbidities. I went over the risks and benefits of the the treatment options (if any could be made) with regard to their particular cancer type, their cancer stage, their age, and their co-morbidities.            COVID-19 Discussion:    I had long discussion with patient and any applicable family about the COVID-19 coronavirus epidemic and the recommended precautions with regard to cancer and/or hematology patients. I have re-iterated the CDC recommendations for adequate hand washing, use of hand -like products, and coughing into elbow, etc. In addition, especially for our patients who are on chemotherapy and/or our otherwise immunocompromised patients, I have recommended avoidance of crowds, including movie theaters, restaurants, churches, etc. I have recommended avoidance of any sick or symptomatic family members and/or friends. I have also recommended avoidance of any raw and unwashed food products, and general avoidance of food items that have not been prepared by themselves. The patient has been asked to call us immediately with any symptom developments, issues, questions or other general concerns.       I spent over 25 mins of time with the patient. Reviewed results of the recently ordered labs, tests and studies; made directives with regards to the results. Over half of this time was spent couseling and coordinating care.    I have explained all of the above in detail and the patient understands all of the current recommendation(s). I have answered all of their questions to the best of my ability and to their complete satisfaction.   The patient is to continue with the current management plan.    Electronically signed by William Joe MD

## 2022-10-11 ENCOUNTER — OFFICE VISIT (OUTPATIENT)
Dept: HEMATOLOGY/ONCOLOGY | Facility: CLINIC | Age: 67
End: 2022-10-11
Payer: COMMERCIAL

## 2022-10-11 VITALS
SYSTOLIC BLOOD PRESSURE: 160 MMHG | HEART RATE: 68 BPM | BODY MASS INDEX: 26.18 KG/M2 | WEIGHT: 147.81 LBS | DIASTOLIC BLOOD PRESSURE: 73 MMHG | RESPIRATION RATE: 16 BRPM | TEMPERATURE: 98 F

## 2022-10-11 DIAGNOSIS — R93.3 ABNORMAL COMPUTED TOMOGRAPHY OF STOMACH: ICD-10-CM

## 2022-10-11 DIAGNOSIS — R19.8 ABNORMAL FINDINGS ON ESOPHAGOGASTRODUODENOSCOPY (EGD): ICD-10-CM

## 2022-10-11 DIAGNOSIS — C49.A2 GASTROINTESTINAL STROMAL TUMOR (GIST) OF STOMACH: Primary | ICD-10-CM

## 2022-10-11 PROCEDURE — 3078F PR MOST RECENT DIASTOLIC BLOOD PRESSURE < 80 MM HG: ICD-10-PCS | Mod: CPTII,S$GLB,, | Performed by: INTERNAL MEDICINE

## 2022-10-11 PROCEDURE — 1101F PT FALLS ASSESS-DOCD LE1/YR: CPT | Mod: CPTII,S$GLB,, | Performed by: INTERNAL MEDICINE

## 2022-10-11 PROCEDURE — 1159F PR MEDICATION LIST DOCUMENTED IN MEDICAL RECORD: ICD-10-PCS | Mod: CPTII,S$GLB,, | Performed by: INTERNAL MEDICINE

## 2022-10-11 PROCEDURE — 3077F PR MOST RECENT SYSTOLIC BLOOD PRESSURE >= 140 MM HG: ICD-10-PCS | Mod: CPTII,S$GLB,, | Performed by: INTERNAL MEDICINE

## 2022-10-11 PROCEDURE — 99214 PR OFFICE/OUTPT VISIT, EST, LEVL IV, 30-39 MIN: ICD-10-PCS | Mod: S$GLB,,, | Performed by: INTERNAL MEDICINE

## 2022-10-11 PROCEDURE — 1126F AMNT PAIN NOTED NONE PRSNT: CPT | Mod: CPTII,S$GLB,, | Performed by: INTERNAL MEDICINE

## 2022-10-11 PROCEDURE — 3288F FALL RISK ASSESSMENT DOCD: CPT | Mod: CPTII,S$GLB,, | Performed by: INTERNAL MEDICINE

## 2022-10-11 PROCEDURE — 1159F MED LIST DOCD IN RCRD: CPT | Mod: CPTII,S$GLB,, | Performed by: INTERNAL MEDICINE

## 2022-10-11 PROCEDURE — 1160F RVW MEDS BY RX/DR IN RCRD: CPT | Mod: CPTII,S$GLB,, | Performed by: INTERNAL MEDICINE

## 2022-10-11 PROCEDURE — 99214 OFFICE O/P EST MOD 30 MIN: CPT | Mod: S$GLB,,, | Performed by: INTERNAL MEDICINE

## 2022-10-11 PROCEDURE — 1126F PR PAIN SEVERITY QUANTIFIED, NO PAIN PRESENT: ICD-10-PCS | Mod: CPTII,S$GLB,, | Performed by: INTERNAL MEDICINE

## 2022-10-11 PROCEDURE — 3008F BODY MASS INDEX DOCD: CPT | Mod: CPTII,S$GLB,, | Performed by: INTERNAL MEDICINE

## 2022-10-11 PROCEDURE — 1160F PR REVIEW ALL MEDS BY PRESCRIBER/CLIN PHARMACIST DOCUMENTED: ICD-10-PCS | Mod: CPTII,S$GLB,, | Performed by: INTERNAL MEDICINE

## 2022-10-11 PROCEDURE — 3008F PR BODY MASS INDEX (BMI) DOCUMENTED: ICD-10-PCS | Mod: CPTII,S$GLB,, | Performed by: INTERNAL MEDICINE

## 2022-10-11 PROCEDURE — 1101F PR PT FALLS ASSESS DOC 0-1 FALLS W/OUT INJ PAST YR: ICD-10-PCS | Mod: CPTII,S$GLB,, | Performed by: INTERNAL MEDICINE

## 2022-10-11 PROCEDURE — 3077F SYST BP >= 140 MM HG: CPT | Mod: CPTII,S$GLB,, | Performed by: INTERNAL MEDICINE

## 2022-10-11 PROCEDURE — 3288F PR FALLS RISK ASSESSMENT DOCUMENTED: ICD-10-PCS | Mod: CPTII,S$GLB,, | Performed by: INTERNAL MEDICINE

## 2022-10-11 PROCEDURE — 3078F DIAST BP <80 MM HG: CPT | Mod: CPTII,S$GLB,, | Performed by: INTERNAL MEDICINE

## 2022-11-08 ENCOUNTER — OFFICE VISIT (OUTPATIENT)
Dept: UROLOGY | Facility: CLINIC | Age: 67
End: 2022-11-08
Payer: COMMERCIAL

## 2022-11-08 VITALS
DIASTOLIC BLOOD PRESSURE: 73 MMHG | HEART RATE: 68 BPM | WEIGHT: 147.69 LBS | BODY MASS INDEX: 26.17 KG/M2 | HEIGHT: 63 IN | SYSTOLIC BLOOD PRESSURE: 135 MMHG

## 2022-11-08 DIAGNOSIS — N13.30 HYDRONEPHROSIS OF RIGHT KIDNEY: Primary | ICD-10-CM

## 2022-11-08 PROCEDURE — 3075F PR MOST RECENT SYSTOLIC BLOOD PRESS GE 130-139MM HG: ICD-10-PCS | Mod: CPTII,S$GLB,, | Performed by: NURSE PRACTITIONER

## 2022-11-08 PROCEDURE — 99999 PR PBB SHADOW E&M-EST. PATIENT-LVL III: CPT | Mod: PBBFAC,,, | Performed by: NURSE PRACTITIONER

## 2022-11-08 PROCEDURE — 3078F PR MOST RECENT DIASTOLIC BLOOD PRESSURE < 80 MM HG: ICD-10-PCS | Mod: CPTII,S$GLB,, | Performed by: NURSE PRACTITIONER

## 2022-11-08 PROCEDURE — 1159F PR MEDICATION LIST DOCUMENTED IN MEDICAL RECORD: ICD-10-PCS | Mod: CPTII,S$GLB,, | Performed by: NURSE PRACTITIONER

## 2022-11-08 PROCEDURE — 1160F RVW MEDS BY RX/DR IN RCRD: CPT | Mod: CPTII,S$GLB,, | Performed by: NURSE PRACTITIONER

## 2022-11-08 PROCEDURE — 99999 PR PBB SHADOW E&M-EST. PATIENT-LVL III: ICD-10-PCS | Mod: PBBFAC,,, | Performed by: NURSE PRACTITIONER

## 2022-11-08 PROCEDURE — 3008F BODY MASS INDEX DOCD: CPT | Mod: CPTII,S$GLB,, | Performed by: NURSE PRACTITIONER

## 2022-11-08 PROCEDURE — 99212 PR OFFICE/OUTPT VISIT, EST, LEVL II, 10-19 MIN: ICD-10-PCS | Mod: S$GLB,,, | Performed by: NURSE PRACTITIONER

## 2022-11-08 PROCEDURE — 3075F SYST BP GE 130 - 139MM HG: CPT | Mod: CPTII,S$GLB,, | Performed by: NURSE PRACTITIONER

## 2022-11-08 PROCEDURE — 3008F PR BODY MASS INDEX (BMI) DOCUMENTED: ICD-10-PCS | Mod: CPTII,S$GLB,, | Performed by: NURSE PRACTITIONER

## 2022-11-08 PROCEDURE — 1159F MED LIST DOCD IN RCRD: CPT | Mod: CPTII,S$GLB,, | Performed by: NURSE PRACTITIONER

## 2022-11-08 PROCEDURE — 99212 OFFICE O/P EST SF 10 MIN: CPT | Mod: S$GLB,,, | Performed by: NURSE PRACTITIONER

## 2022-11-08 PROCEDURE — 3078F DIAST BP <80 MM HG: CPT | Mod: CPTII,S$GLB,, | Performed by: NURSE PRACTITIONER

## 2022-11-08 PROCEDURE — 1160F PR REVIEW ALL MEDS BY PRESCRIBER/CLIN PHARMACIST DOCUMENTED: ICD-10-PCS | Mod: CPTII,S$GLB,, | Performed by: NURSE PRACTITIONER

## 2022-11-08 NOTE — PROGRESS NOTES
[facility]  Staff: JESSICA King-C    PCP: MD Prachi    Chief Complaint: Cysto F/UP     Subjective:        HPI: Lorraine Warner is a 67 y.o. female presents today for f/up.  Pt has [employer] insurance which is not covered under our facility.  Therefore never had postop workup with Mag 3 scan performed or f/up.    Pt was initially evaluated by Dr. Kennedy on 9/15/22 for right hydronephrosis.    Recently underwent a CT scan which showed mild/moderate right hydro to the UPJ. In 7/2021 she had a CT which is read as parapelvic cysts with no hydro, this is similar to previous reads however on her PET scan it appears to be more consistent with hydro.      No right sided flank pain.   No issues with urination.   No hematuria. No dysuria.      Last urine culture: no documented UTIs    Procedure performed on 9/30/22:  1.  Cystoscopy with right retrograde pyelogram  2.  Fluoroscopy < 1 hour     Specimen(s): none     Staff Surgeon:  Giovana Kennedy MD     Anesthesia: General LMA anesthesia     Indications: Lorraine Warner is a 67 y.o. female with right hydro vs parapelvic cysts on imaging. She presents for RGP.   Findings:   - grade 2 cystocele   - mild right hydronephrosis to the level of the UPJ  - no filing defects   - slow drainage of right renal collecting system after 10 mins      REVIEW OF SYSTEMS:  A comprehensive 10 system review was performed and is negative except as noted above in HPI    PMHx:  Past Medical History:   Diagnosis Date    Abnormal computed tomography of stomach 10/24/2018    Abnormal CT of the abdomen (soft tissue mass cardia of stomach) 10/24/2018    Chest pressure     @ rest within last week- no NTG taken - sees Dr. Almeida - seen last within month    Coronary artery disease     Gastrointestinal stromal tumor (GIST) of stomach 01/15/2019    GERD (gastroesophageal reflux disease)     Hyperlipidemia     Hypertension     Mass of stomach - cardia 10/24/2018      PSHx:  Past Surgical History:   Procedure Laterality Date    ANGIOGRAM, CORONARY, WITH LEFT HEART CATHETERIZATION N/A 8/27/2019    Procedure: ANGIOGRAM,CORONARY,WITH LEFT HEART CATHETERIZATION;  Surgeon: Chandana Almeida MD;  Location: [facility] CATH/EP LAB;  Service: Cardiology;  Laterality: N/A;    BLADDER SURGERY      CORONARY STENT PLACEMENT  2018    CYSTOSCOPY W/ RETROGRADES Right 9/30/2022    Procedure: CYSTOSCOPY, WITH RETROGRADE PYELOGRAM;  Surgeon: Giovana Kennedy MD;  Location: [facility] OR;  Service: Urology;  Laterality: Right;    ENDOSCOPIC ULTRASOUND OF UPPER GASTROINTESTINAL TRACT  08/10/2021    ENDOSCOPIC ULTRASOUND OF UPPER GASTROINTESTINAL TRACT N/A 8/10/2021    Procedure: ULTRASOUND, UPPER GI TRACT, ENDOSCOPIC;  Surgeon: Kash Lyons III, MD;  Location: [facility] ENDO;  Service: Endoscopy;  Laterality: N/A;    LAPAROSCOPIC PARTIAL GASTRECTOMY  11/27/2018    Dr. Garcia     UPPER GASTROINTESTINAL ENDOSCOPY  08/10/2021     Allergies:  Patient has no known allergies.    Medications: reviewed     Objective:     Vitals:    11/08/22 0926   BP: 135/73   Pulse: 68       Physical Exam  Vitals reviewed.   Constitutional:       Appearance: She is well-developed.   HENT:      Head: Normocephalic and atraumatic.   Eyes:      Conjunctiva/sclera: Conjunctivae normal.      Pupils: Pupils are equal, round, and reactive to light.   Cardiovascular:      Rate and Rhythm: Normal rate and regular rhythm.      Heart sounds: Normal heart sounds.   Pulmonary:      Effort: Pulmonary effort is normal.      Breath sounds: Normal breath sounds.   Abdominal:      General: Bowel sounds are normal.      Palpations: Abdomen is soft.   Musculoskeletal:         General: Normal range of motion.      Cervical back: Normal range of motion and neck supple.   Skin:     General: Skin is warm and dry.   Neurological:      Mental Status: She is alert and oriented to person, place, and time.      Deep Tendon Reflexes: Reflexes are normal  and symmetric.   Psychiatric:         Behavior: Behavior normal.         Thought Content: Thought content normal.         Judgment: Judgment normal.         Assessment:       1. Hydronephrosis of right kidney          Plan:     It was thoroughly explained to pt and family member that she would need to f/up with another Urology facility at this time for further workup.  We do not accept [employer] insurance.  Pt and family voiced understanding.      Samantha Monterroso, JESSICA-C

## 2022-11-14 ENCOUNTER — TELEPHONE (OUTPATIENT)
Dept: UROLOGY | Facility: CLINIC | Age: 67
End: 2022-11-14
Payer: COMMERCIAL

## 2022-11-14 NOTE — TELEPHONE ENCOUNTER
----- Message from Juliana Rodas sent at 11/14/2022  3:20 PM CST -----  Contact: Self  Type:  Needs Medical Advice    Who Called: patient    Would the patient rather a call back or a response via [facility]? call  Best Call Back Number: [phone]  Additional Information: patient was seen last week. States through  she was supposed to get a call back regarding she needed more testing and she was supposed to get a scan but apparently insurance doesn't cover it. Was supposed to call back once you knew what her insurance covers. Pt isn't feeling well and wants to be called back.     ##PLEASE CALL WITH AN ##

## 2022-12-13 ENCOUNTER — HOSPITAL ENCOUNTER (OUTPATIENT)
Dept: RADIOLOGY | Facility: HOSPITAL | Age: 67
Discharge: HOME | End: 2022-12-13
Attending: STUDENT IN AN ORGANIZED HEALTH CARE EDUCATION/TRAINING PROGRAM
Payer: COMMERCIAL

## 2022-12-13 VITALS — WEIGHT: 147.69 LBS | BODY MASS INDEX: 26.17 KG/M2

## 2022-12-13 DIAGNOSIS — N13.30 HYDRONEPHROSIS OF RIGHT KIDNEY: ICD-10-CM

## 2022-12-13 PROCEDURE — A9562 TC99M MERTIATIDE: HCPCS

## 2022-12-13 PROCEDURE — 78708 K FLOW/FUNCT IMAGE W/DRUG: CPT | Mod: TC

## 2022-12-13 PROCEDURE — 63600175 PHARM REV CODE 636 W HCPCS: Performed by: STUDENT IN AN ORGANIZED HEALTH CARE EDUCATION/TRAINING PROGRAM

## 2022-12-13 RX ORDER — FUROSEMIDE 10 MG/ML
20 INJECTION INTRAMUSCULAR; INTRAVENOUS ONCE
Status: COMPLETED | OUTPATIENT
Start: 2022-12-13 | End: 2022-12-13

## 2022-12-13 RX ADMIN — FUROSEMIDE 20 MG: 10 INJECTION, SOLUTION INTRAMUSCULAR; INTRAVENOUS at 01:12

## 2023-02-22 DIAGNOSIS — Z12.31 ENCOUNTER FOR SCREENING MAMMOGRAM FOR MALIGNANT NEOPLASM OF BREAST: Primary | ICD-10-CM

## 2023-03-02 DIAGNOSIS — R92.8 ABNORMAL MAMMOGRAM: Primary | ICD-10-CM

## 2023-03-03 ENCOUNTER — LAB VISIT (OUTPATIENT)
Dept: LAB | Facility: HOSPITAL | Age: 68
End: 2023-03-03
Attending: INTERNAL MEDICINE
Payer: MEDICARE

## 2023-03-03 DIAGNOSIS — E55.9 AVITAMINOSIS D: ICD-10-CM

## 2023-03-03 DIAGNOSIS — N18.30 CHRONIC KIDNEY DISEASE, STAGE III (MODERATE): Primary | ICD-10-CM

## 2023-03-03 LAB
25(OH)D3+25(OH)D2 SERPL-MCNC: 27 NG/ML (ref 30–96)
ALBUMIN SERPL BCP-MCNC: 3.9 G/DL (ref 3.5–5.2)
ALBUMIN/CREAT UR: 226.5 UG/MG (ref 0–30)
ANION GAP SERPL CALC-SCNC: 6 MMOL/L (ref 8–16)
BACTERIA #/AREA URNS HPF: NEGATIVE /HPF
BASOPHILS # BLD AUTO: 0.06 K/UL (ref 0–0.2)
BASOPHILS NFR BLD: 0.8 % (ref 0–1.9)
BUN SERPL-MCNC: 17 MG/DL (ref 8–23)
CALCIUM SERPL-MCNC: 9.1 MG/DL (ref 8.7–10.5)
CHLORIDE SERPL-SCNC: 104 MMOL/L (ref 95–110)
CO2 SERPL-SCNC: 26 MMOL/L (ref 23–29)
CREAT SERPL-MCNC: 1.1 MG/DL (ref 0.5–1.4)
CREAT UR-MCNC: 118 MG/DL (ref 15–325)
DIFFERENTIAL METHOD: ABNORMAL
EOSINOPHIL # BLD AUTO: 0.1 K/UL (ref 0–0.5)
EOSINOPHIL NFR BLD: 1.4 % (ref 0–8)
ERYTHROCYTE [DISTWIDTH] IN BLOOD BY AUTOMATED COUNT: 13.6 % (ref 11.5–14.5)
EST. GFR  (NO RACE VARIABLE): 54.4 ML/MIN/1.73 M^2
GLUCOSE SERPL-MCNC: 122 MG/DL (ref 70–110)
HCT VFR BLD AUTO: 39.4 % (ref 37–48.5)
HGB BLD-MCNC: 13 G/DL (ref 12–16)
HYALINE CASTS #/AREA URNS LPF: 5 /LPF
IMM GRANULOCYTES # BLD AUTO: 0.03 K/UL (ref 0–0.04)
IMM GRANULOCYTES NFR BLD AUTO: 0.4 % (ref 0–0.5)
LYMPHOCYTES # BLD AUTO: 2.4 K/UL (ref 1–4.8)
LYMPHOCYTES NFR BLD: 29.8 % (ref 18–48)
MCH RBC QN AUTO: 31.6 PG (ref 27–31)
MCHC RBC AUTO-ENTMCNC: 33 G/DL (ref 32–36)
MCV RBC AUTO: 96 FL (ref 82–98)
MICROALBUMIN UR DL<=1MG/L-MCNC: 267.3 UG/ML
MICROSCOPIC COMMENT: ABNORMAL
MONOCYTES # BLD AUTO: 0.5 K/UL (ref 0.3–1)
MONOCYTES NFR BLD: 5.8 % (ref 4–15)
NEUTROPHILS # BLD AUTO: 4.9 K/UL (ref 1.8–7.7)
NEUTROPHILS NFR BLD: 61.8 % (ref 38–73)
NRBC BLD-RTO: 0 /100 WBC
PHOSPHATE SERPL-MCNC: 3.2 MG/DL (ref 2.7–4.5)
PLATELET # BLD AUTO: 254 K/UL (ref 150–450)
PMV BLD AUTO: 10.4 FL (ref 9.2–12.9)
POTASSIUM SERPL-SCNC: 3.9 MMOL/L (ref 3.5–5.1)
PTH-INTACT SERPL-MCNC: 138 PG/ML (ref 9–77)
RBC # BLD AUTO: 4.12 M/UL (ref 4–5.4)
RBC #/AREA URNS HPF: 2 /HPF (ref 0–4)
SODIUM SERPL-SCNC: 136 MMOL/L (ref 136–145)
SQUAMOUS #/AREA URNS HPF: 2 /HPF
WBC # BLD AUTO: 7.96 K/UL (ref 3.9–12.7)
WBC #/AREA URNS HPF: 31 /HPF (ref 0–5)

## 2023-03-03 PROCEDURE — 36415 COLL VENOUS BLD VENIPUNCTURE: CPT | Performed by: INTERNAL MEDICINE

## 2023-03-03 PROCEDURE — 85025 COMPLETE CBC W/AUTO DIFF WBC: CPT | Performed by: INTERNAL MEDICINE

## 2023-03-03 PROCEDURE — 81001 URINALYSIS AUTO W/SCOPE: CPT | Performed by: INTERNAL MEDICINE

## 2023-03-03 PROCEDURE — 80069 RENAL FUNCTION PANEL: CPT | Performed by: INTERNAL MEDICINE

## 2023-03-03 PROCEDURE — 83970 ASSAY OF PARATHORMONE: CPT | Performed by: INTERNAL MEDICINE

## 2023-03-03 PROCEDURE — 82306 VITAMIN D 25 HYDROXY: CPT | Performed by: INTERNAL MEDICINE

## 2023-03-03 PROCEDURE — 82570 ASSAY OF URINE CREATININE: CPT | Performed by: INTERNAL MEDICINE

## 2023-03-08 DIAGNOSIS — M85.88 OTHER SPECIFIED DISORDERS OF BONE DENSITY AND STRUCTURE, OTHER SITE: Primary | ICD-10-CM

## 2023-03-13 ENCOUNTER — HOSPITAL ENCOUNTER (OUTPATIENT)
Dept: RADIOLOGY | Facility: HOSPITAL | Age: 68
Discharge: HOME | End: 2023-03-13
Payer: MEDICARE

## 2023-03-13 DIAGNOSIS — M85.88 OTHER SPECIFIED DISORDERS OF BONE DENSITY AND STRUCTURE, OTHER SITE: ICD-10-CM

## 2023-03-13 PROCEDURE — 77080 DXA BONE DENSITY AXIAL: CPT | Mod: TC,PO

## 2023-03-14 ENCOUNTER — HOSPITAL ENCOUNTER (OUTPATIENT)
Dept: RADIOLOGY | Facility: HOSPITAL | Age: 68
Discharge: HOME | End: 2023-03-14
Payer: MEDICARE

## 2023-03-14 DIAGNOSIS — R92.8 ABNORMAL MAMMOGRAM: ICD-10-CM

## 2023-03-14 PROCEDURE — 76642 ULTRASOUND BREAST LIMITED: CPT | Mod: TC,PO,LT

## 2023-03-14 PROCEDURE — 77062 BREAST TOMOSYNTHESIS BI: CPT | Mod: TC,PO

## 2023-04-10 NOTE — PROGRESS NOTES
[facility] Hematology/Oncology  PROGRESS NOTE -  Follow-up Visit      Subjective:       Patient ID:   NAME: Lorraine Warner : 1955     67 y.o. female    Referring Doc: Judith  Other Physicians: Azeb Kumar    Chief Complaint:  gastric submucosal nodule/GIST f/u        History of Present Illness:     Patient returns today for a  regularly scheduled follow-up visit.  The patient is here today to go over the results of the recently ordered labs, tests and studies. She is here with her .    She had scope on 10/7/2022 with Dr Asif with planned follow-up sometime this year    She had CT scans on 2022 with no evidence of residual or recurrent cancer but she has some right sided hydronephrosis.     She is otherwise doing ok with no new issues.   She denies any CP, SOB, HA's or N/V.       She saw Loc in 2022     Discussed covid19 precautions - she has not been vaccinated; she had covid in Dec 2021 and recovered      ROS:   GEN: normal without any fever, night sweats or weight loss  HEENT: normal with no HA's, sore throat, stiff neck, changes in vision  CV: normal with no CP, SOB, PND, MARSHALL or orthopnea  PULM: normal with no SOB, cough, hemoptysis, sputum or pleuritic pain  GI: normal with no abdominal pain, nausea, vomiting, constipation, diarrhea, melanotic stools, BRBPR, or hematemesis; some reflux residualr  : normal with no hematuria, dysuria  BREAST: normal with no mass, discharge, pain  SKIN: normal with no rash, erythema, bruising, or swelling    Allergies:  Review of patient's allergies indicates:  No Known Allergies    Medications:    Current Outpatient Medications:     amLODIPine (NORVASC) 5 MG tablet, amlodipine 5 mg tablet  TAKE 1 TABLET BY MOUTH ONCE DAILY, Disp: , Rfl:     aspirin (ECOTRIN) 81 MG EC tablet, Take 81 mg by mouth once daily., Disp: , Rfl:     atorvastatin (LIPITOR) 40 MG tablet, Take 40 mg by mouth every evening. , Disp: , Rfl:     cholecalciferol,  "vitamin D3, (VITAMIN D3) 50 mcg (2,000 unit) Cap, Take 1 capsule by mouth once daily., Disp: , Rfl:     metoprolol succinate (TOPROL-XL) 25 MG 24 hr tablet, Take 25 mg by mouth every evening. , Disp: , Rfl:     omeprazole (PRILOSEC) 40 MG capsule, omeprazole 40 mg capsule,delayed release  TAKE 1 CAPSULE BY MOUTH 30 MINUTES BEFORE BREAKFAST ONCE DAILY, Disp: , Rfl:     potassium chloride (MICRO-K) 10 MEQ CpSR, Take 10 mEq by mouth every morning. , Disp: , Rfl:     sucralfate (CARAFATE) 1 gram tablet, sucralfate 1 gram tablet  TAKE 1 TABLET BY MOUTH ON AN EMPTY STOMACH TWICE DAILY FOR 90 DAYS, Disp: , Rfl:     pantoprazole (PROTONIX) 40 MG tablet, Take 40 mg by mouth once daily., Disp: , Rfl:     PMHx/PSHx Updates:  See patient's last visit with me on 10/11/2022  See H&P on 10/25/2018        Pathology:  Cancer Staging      Gastric Wedge Resection: 11/27/2018:    FINAL DIAGNOSIS:  POSTERIOR STOMACH, WEDGE RESECTION:   GASTROINTESTINAL STROMAL TUMOR (GIST), MIXED SPINDLE CELL AND  EPITHELIOID TYPE.    2.5 CM IN GREATEST DIMENSIONS.    THE SHAVED SURGICAL MARGIN OF RESECTION IS FREE OF TUMOR.    THE TUMOR FOCALLY EXTENDS TO THE SEROSAL SURFACE.  pT2NX, (Stage IA).  G1: Low Grade with mitotic rate less than or equal to 5/5mm2  KIT ():     Positive.   DOG-1:     Positive    RISK ASSESSMENT:   Very low risk (1.9%), Based on the strict criteria of mitotic count  and size of tumor. A moderate risk cannot be entirely   excluded.    Objective:     Vitals:  Blood pressure (!) 146/81, pulse 66, temperature 97.4 °F (36.3 °C), resp. rate 18, height 5' 0.24" (1.53 m), weight 65.8 kg (145 lb).    Physical Examination:   GEN: no apparent distress, comfortable; AAOx3  HEAD: atraumatic and normocephalic  EYES: no pallor, no icterus, PERRLA  ENT: OMM, no pharyngeal erythema, external ears WNL; no nasal discharge; no thrush  NECK: no masses, thyroid normal, trachea midline, no LAD/LN's, supple  CV: RRR with no murmur; normal pulse; " normal S1 and S2; no pedal edema  CHEST: Normal respiratory effort; CTAB; normal breath sounds; no wheeze or crackles  ABDOM: nontender and nondistended; soft; normal bowel sounds; no rebound/guarding  MUSC/Skeletal: ROM normal; no crepitus; joints normal; no deformities or arthropathy  EXTREM: no clubbing, cyanosis, inflammation or swelling  SKIN: no rashes, lesions, ulcers, petechiae or subcutaneous nodules  : no amaro  NEURO: grossly intact; motor/sensory WNL; AAOx3; no tremors  PSYCH: normal mood, affect and behavior  LYMPH: normal cervical, supraclavicular, axillary and groin LN's            Labs:      Lab Results   Component Value Date    WBC 7.96 03/03/2023    HGB 13.0 03/03/2023    HCT 39.4 03/03/2023    MCV 96 03/03/2023     03/03/2023     CMP  Sodium   Date Value Ref Range Status   03/03/2023 136 136 - 145 mmol/L Final   01/17/2019 138 134 - 144 mmol/L      Potassium   Date Value Ref Range Status   03/03/2023 3.9 3.5 - 5.1 mmol/L Final     Chloride   Date Value Ref Range Status   03/03/2023 104 95 - 110 mmol/L Final   01/17/2019 98 98 - 110 mmol/L      CO2   Date Value Ref Range Status   03/03/2023 26 23 - 29 mmol/L Final     Glucose   Date Value Ref Range Status   03/03/2023 122 (H) 70 - 110 mg/dL Final   01/17/2019 114 (H) 70 - 99 mg/dL      BUN   Date Value Ref Range Status   03/03/2023 17 8 - 23 mg/dL Final     Creatinine   Date Value Ref Range Status   03/03/2023 1.1 0.5 - 1.4 mg/dL Final   01/17/2019 1.08 0.60 - 1.40 mg/dL      Calcium   Date Value Ref Range Status   03/03/2023 9.1 8.7 - 10.5 mg/dL Final     Total Protein   Date Value Ref Range Status   07/27/2022 7.2 6.0 - 8.4 g/dL Final     Albumin   Date Value Ref Range Status   03/03/2023 3.9 3.5 - 5.2 g/dL Final   01/17/2019 4.2 3.1 - 4.7 g/dL      Total Bilirubin   Date Value Ref Range Status   07/27/2022 0.6 0.1 - 1.0 mg/dL Final     Comment:     For infants and newborns, interpretation of results should be based  on gestational age,  weight and in agreement with clinical  observations.    Premature Infant recommended reference ranges:  Up to 24 hours.............<8.0 mg/dL  Up to 48 hours............<12.0 mg/dL  3-5 days..................<15.0 mg/dL  6-29 days.................<15.0 mg/dL       Alkaline Phosphatase   Date Value Ref Range Status   07/27/2022 98 55 - 135 U/L Final     AST   Date Value Ref Range Status   07/27/2022 21 10 - 40 U/L Final     ALT   Date Value Ref Range Status   07/27/2022 18 10 - 44 U/L Final     Anion Gap   Date Value Ref Range Status   03/03/2023 6 (L) 8 - 16 mmol/L Final     eGFR if    Date Value Ref Range Status   07/27/2022 59.2 (A) >60 mL/min/1.73 m^2 Final     eGFR if non    Date Value Ref Range Status   07/27/2022 51.3 (A) >60 mL/min/1.73 m^2 Final     Comment:     Calculation used to obtain the estimated glomerular filtration  rate (eGFR) is the CKD-EPI equation.                Radiology/Diagnostic Studies:      CT 9/6/2022:    IMPRESSION:     1. Negative for recurrent malignancy or metastatic disease, with unchanged postoperative changes of proximal stomach.  2. Moderate right hydronephrosis, of undetermined etiology and unchanged. Although renal sinus cysts could give a similar appearance, and the current appearance appears more indicative of hydronephrosis of undetermined etiology. Further imaging evaluation is recommended with CT urogram without and with IV contrast to assess for potential etiology and definitively differentiate renal sinus cysts from hydronephrosis.        CT abdom/pelvis  9/7/2021:      IMPRESSION:  1. Postoperative changes of prior GIST tumor resection of the cardia of the stomach, with no lymphadenopathy or CT finding of residual/recurrent disease.  2. No acute abdominal or pelvic abnormality.          US  abdom  7/28/2021:  IMPRESSION:  Dilated common duct reaching diameter of 8 mm. Further evaluation with CT abdomen and pelvis with IV contrast and MRCP  are recommended.         CT abdom/pelvis  9/2/2020:    IMPRESSION:     1.  Status post resection of gastric GIST tumor. No recurrent tumor  or regional soft tissue abnormality identified.  2.  No other acute changes when compared previous exam.         PET  7/29/2019:  Impression       No FDG PET/CT findings to suggest recurrent or metastatic disease.             Chest CT 4/24/2019    IMPRESSION:  1. No acute abnormality seen.  2. The 2 lung nodules seen on CT PET scan dated 01/29/2019 represent calcified  granulomas.  3. There are several small perifissural lymph nodes in the right lung measuring  5 mm or less in diameter.  4. There  is no evidence of lung metastasis.  5. No change in the sclerotic focus in the right scapula described on the  previous PET scan and increases the probability of it representing a benign  lesion.        PET 1/15/2019:  IMPRESSION:    1. No current convincing evidence for residual malignancy or metastatic disease.    2. Incidental findings tiny noncalcified left lung nodules and sclerotic focus  in right scapula. Benign etiologies are favored, although metastatic disease is  conceivable but felt less likely. CT thorax without IV contrast follow-up is  recommended in 3 months to begin to document prolonged stability if imaging  follow-up suggested for at least 2 years. If old outside CTs of the chest are  available to document prolonged stability and these are made available,  addendum can be issued at that time.    3. Focus of FDG uptake along deep aspect of umbilicus without CT correlate is  likely inflammatory in nature and related to recent surgery.        I have reviewed all available lab results and radiology reports.    Assessment/Plan:   (1) 67 y.o. female with diagnosis of a submucosal nodule of the gastric cardia who has been referred by Dr Yunior Wong with GI for evaluation by medical hematology/oncology.   - She had originally presented with abdominal pain and underwent EGD  "with Dr Wong on 10/19/2018.   - She had a CT scan on 10/11/2018 which showed a 2.7 x 1.6cm soft tissue mass in the cardia of the stomach.   - on EGD, he found a submucosal nodule in the cardia of the stomach.   - Pathology from the initial biopsy seems to have been nondiagnostic.   - she saw Dr Garcia with Gen Surg and underwent a wedge resection on 11/27/2018 with the pathology ultimately showing GIST  - G1: Low Grade; pT2NX, (Stage IA).  - upon review of the latest guidelines, she should not need any adjuvant therapy with the drug Gleevec due to the low-grade nature of her GIST  - "According to pathologist, her tumor was 2.5cm but was low grade with mitotic rate < or = to 5/50 hpf  - her risk assessment as a result is very low risk at 1.9% risk on metastasis"   - as a result, based on the latest NCCN guidelines (v. 1.2019) I would recommend surveillance only and she should not need Gleevec therapy at this time    - she had PEt on 7/29/2019 7/21/2020:  - She last saw Dr Wong about 4 months ago and had repeat scope which was "good" per patient   - she is due for repeat labs and scnas    1/21/2021:  - some residual reflux symptoms but she has not seen Dr Wong in over a year  - scans in Sept 2020 on chart and good  - repeat scans again in Sept 2021 7/29/2021:  - check up to date labs  - CT scheduled for Sept 7th 2021  - she needs to f/u with Dr Wong with GI for the reflux sx's  - recent US done with Dr mustafa with some dilation of the bile duct    1/27/2022:  - she had CT scan in Sept 2021 which were stable with no evidence of recurrent cancer  - she has some residual reflux which is controlled with prilosec  - she has not follow-up with Dr Wong recently and I encouraged her to do so      7/27/2022:  - she is doing ok with no new issues  - I have recommended repeat CT scans in Sept 2022  - she needs up to date labs  - I recommend and encouraged her to f/u with GI for repeat " endoscopies    10/11/2022:  - She had scope on 10/7/2022 with Dr Asif - looks like they just found a small ulcer 3mm at GE junction and some erythema in gastric  - She had CT scans on 9/6/2022 with no evidence of residual or recurrent cancer but she has some right sided hydronephrosis.   - She saw Dr Kennedy with   and she had a cystoscope on 9/30  -  is expecting to see her again in one month    4/11/2023:  - doing ok with no new issues  - will get repeat scans in Sept 2023    (2) HTN and hypercholesterolemia     (3) Hernia     (4) Chronic gastritis  -    (5) Low potassium     (6) S/p prior bladder drainage issue          VISIT DIAGNOSES:      Gastrointestinal stromal tumor (GIST) of stomach    Abnormal computed tomography of stomach    Mass of stomach - cardia          PLAN:  1. F/u with GI as directed  2. No indication for Gleevec at this time  3. F/u with PCP, GI, Card, Gen Surg,  etc  4. Repeat scan in Sept 2023 or sooner if  directs so  5. RTC in  6 months    Fax note to Lidya Garcia (new PCP, Yefri; Radha; Juan Pablo: Brent    Discussion:     Pathology Discussion:    I reviewed and discussed the pathology report(s) and radiograph reports (if available) in as simple to understand and/or laymen's terms to the best of my ability. I had an indepth conversation with the patient and went over the patient's individual diagnosis based on the information that was currently available. I discussed the TNM staging process with regard to the patient's particular cancer type, and the calculated stage based on the currently available TNM data and literature. I discussed the available prognostic data with regard to the current staging information and how it relates to the prognosis of their particular neoplastic process.          NCCN Guidelines:    I discussed the available treatment option(s) in accordance with the latest literature from the NCCN Clinical Practice Guidelines for the patient's particular  "type of cancer disorder. The NCCN Guidelines provide a "document evidence-based (and) consensus-driven management" of the care of oncology patients. The treatment recommendations were made not only in accordance to the NCCN guidelines, but also factored in to account the patient's overall age, condition, performance status and their medical co-morbidities. I went over the risks and benefits of the the treatment options (if any could be made) with regard to their particular cancer type, their cancer stage, their age, and their co-morbidities.            COVID-19 Discussion:    I had long discussion with patient and any applicable family about the COVID-19 coronavirus epidemic and the recommended precautions with regard to cancer and/or hematology patients. I have re-iterated the CDC recommendations for adequate hand washing, use of hand -like products, and coughing into elbow, etc. In addition, especially for our patients who are on chemotherapy and/or our otherwise immunocompromised patients, I have recommended avoidance of crowds, including movie theaters, restaurants, churches, etc. I have recommended avoidance of any sick or symptomatic family members and/or friends. I have also recommended avoidance of any raw and unwashed food products, and general avoidance of food items that have not been prepared by themselves. The patient has been asked to call us immediately with any symptom developments, issues, questions or other general concerns.       I spent over 25 mins of time with the patient. Reviewed results of the recently ordered labs, tests and studies; made directives with regards to the results. Over half of this time was spent couseling and coordinating care.    I have explained all of the above in detail and the patient understands all of the current recommendation(s). I have answered all of their questions to the best of my ability and to their complete satisfaction.   The patient is to continue " with the current management plan.    Electronically signed by William Joe MD

## 2023-04-11 ENCOUNTER — OFFICE VISIT (OUTPATIENT)
Dept: HEMATOLOGY/ONCOLOGY | Facility: CLINIC | Age: 68
End: 2023-04-11
Payer: MEDICARE

## 2023-04-11 VITALS
TEMPERATURE: 97 F | WEIGHT: 145 LBS | HEART RATE: 66 BPM | HEIGHT: 60 IN | BODY MASS INDEX: 28.47 KG/M2 | SYSTOLIC BLOOD PRESSURE: 146 MMHG | RESPIRATION RATE: 18 BRPM | DIASTOLIC BLOOD PRESSURE: 81 MMHG

## 2023-04-11 DIAGNOSIS — R93.3 ABNORMAL COMPUTED TOMOGRAPHY OF STOMACH: ICD-10-CM

## 2023-04-11 DIAGNOSIS — C49.A2 GASTROINTESTINAL STROMAL TUMOR (GIST) OF STOMACH: Primary | ICD-10-CM

## 2023-04-11 DIAGNOSIS — K31.89 MASS OF STOMACH: ICD-10-CM

## 2023-04-11 PROCEDURE — 1159F MED LIST DOCD IN RCRD: CPT | Mod: CPTII,S$GLB,, | Performed by: INTERNAL MEDICINE

## 2023-04-11 PROCEDURE — 3077F PR MOST RECENT SYSTOLIC BLOOD PRESSURE >= 140 MM HG: ICD-10-PCS | Mod: CPTII,S$GLB,, | Performed by: INTERNAL MEDICINE

## 2023-04-11 PROCEDURE — 3008F BODY MASS INDEX DOCD: CPT | Mod: CPTII,S$GLB,, | Performed by: INTERNAL MEDICINE

## 2023-04-11 PROCEDURE — 1126F PR PAIN SEVERITY QUANTIFIED, NO PAIN PRESENT: ICD-10-PCS | Mod: CPTII,S$GLB,, | Performed by: INTERNAL MEDICINE

## 2023-04-11 PROCEDURE — 99214 OFFICE O/P EST MOD 30 MIN: CPT | Mod: S$GLB,,, | Performed by: INTERNAL MEDICINE

## 2023-04-11 PROCEDURE — 3079F DIAST BP 80-89 MM HG: CPT | Mod: CPTII,S$GLB,, | Performed by: INTERNAL MEDICINE

## 2023-04-11 PROCEDURE — 3077F SYST BP >= 140 MM HG: CPT | Mod: CPTII,S$GLB,, | Performed by: INTERNAL MEDICINE

## 2023-04-11 PROCEDURE — 1101F PT FALLS ASSESS-DOCD LE1/YR: CPT | Mod: CPTII,S$GLB,, | Performed by: INTERNAL MEDICINE

## 2023-04-11 PROCEDURE — 99214 PR OFFICE/OUTPT VISIT, EST, LEVL IV, 30-39 MIN: ICD-10-PCS | Mod: S$GLB,,, | Performed by: INTERNAL MEDICINE

## 2023-04-11 PROCEDURE — 1160F PR REVIEW ALL MEDS BY PRESCRIBER/CLIN PHARMACIST DOCUMENTED: ICD-10-PCS | Mod: CPTII,S$GLB,, | Performed by: INTERNAL MEDICINE

## 2023-04-11 PROCEDURE — 1126F AMNT PAIN NOTED NONE PRSNT: CPT | Mod: CPTII,S$GLB,, | Performed by: INTERNAL MEDICINE

## 2023-04-11 PROCEDURE — 3079F PR MOST RECENT DIASTOLIC BLOOD PRESSURE 80-89 MM HG: ICD-10-PCS | Mod: CPTII,S$GLB,, | Performed by: INTERNAL MEDICINE

## 2023-04-11 PROCEDURE — 1101F PR PT FALLS ASSESS DOC 0-1 FALLS W/OUT INJ PAST YR: ICD-10-PCS | Mod: CPTII,S$GLB,, | Performed by: INTERNAL MEDICINE

## 2023-04-11 PROCEDURE — 1159F PR MEDICATION LIST DOCUMENTED IN MEDICAL RECORD: ICD-10-PCS | Mod: CPTII,S$GLB,, | Performed by: INTERNAL MEDICINE

## 2023-04-11 PROCEDURE — 3288F FALL RISK ASSESSMENT DOCD: CPT | Mod: CPTII,S$GLB,, | Performed by: INTERNAL MEDICINE

## 2023-04-11 PROCEDURE — 1160F RVW MEDS BY RX/DR IN RCRD: CPT | Mod: CPTII,S$GLB,, | Performed by: INTERNAL MEDICINE

## 2023-04-11 PROCEDURE — 3008F PR BODY MASS INDEX (BMI) DOCUMENTED: ICD-10-PCS | Mod: CPTII,S$GLB,, | Performed by: INTERNAL MEDICINE

## 2023-04-11 PROCEDURE — 3288F PR FALLS RISK ASSESSMENT DOCUMENTED: ICD-10-PCS | Mod: CPTII,S$GLB,, | Performed by: INTERNAL MEDICINE

## 2023-04-11 RX ORDER — OMEPRAZOLE 40 MG/1
40 CAPSULE, DELAYED RELEASE ORAL EVERY MORNING
COMMUNITY

## 2023-04-11 RX ORDER — SUCRALFATE 1 G/1
1 TABLET ORAL
COMMUNITY

## 2023-04-11 RX ORDER — AMLODIPINE BESYLATE 5 MG/1
5 TABLET ORAL NIGHTLY
COMMUNITY

## 2023-04-13 ENCOUNTER — HOSPITAL ENCOUNTER (OUTPATIENT)
Dept: RADIOLOGY | Facility: HOSPITAL | Age: 68
Discharge: HOME | End: 2023-04-13
Attending: INTERNAL MEDICINE
Payer: MEDICARE

## 2023-04-13 DIAGNOSIS — C49.A2 GASTROINTESTINAL STROMAL TUMOR (GIST) OF STOMACH: ICD-10-CM

## 2023-04-13 PROCEDURE — 74176 CT ABD & PELVIS W/O CONTRAST: CPT | Mod: TC,PO

## 2023-04-14 ENCOUNTER — TELEPHONE (OUTPATIENT)
Dept: HEMATOLOGY/ONCOLOGY | Facility: CLINIC | Age: 68
End: 2023-04-14

## 2023-04-14 NOTE — TELEPHONE ENCOUNTER
----- Message from William Joe MD sent at 4/13/2023  6:58 PM CDT -----  Please call her with stable report

## 2023-04-20 ENCOUNTER — TELEPHONE (OUTPATIENT)
Dept: HEMATOLOGY/ONCOLOGY | Facility: CLINIC | Age: 68
End: 2023-04-20

## 2023-04-20 NOTE — TELEPHONE ENCOUNTER
Spoke to , made aware of stable report, instructed to keep scheduled f/u in October and call if they need anything in the meantime. Verbalized understanding.

## 2023-04-20 NOTE — TELEPHONE ENCOUNTER
----- Message from Arin Chiang sent at 4/20/2023 10:04 AM CDT -----  Pts  is returning your call     [phone]

## 2023-09-12 ENCOUNTER — LAB VISIT (OUTPATIENT)
Dept: LAB | Facility: HOSPITAL | Age: 68
End: 2023-09-12
Attending: INTERNAL MEDICINE
Payer: MEDICARE

## 2023-09-12 DIAGNOSIS — R80.9 PROTEINURIA: ICD-10-CM

## 2023-09-12 DIAGNOSIS — N18.30 CHRONIC KIDNEY DISEASE, STAGE III (MODERATE): ICD-10-CM

## 2023-09-12 DIAGNOSIS — E55.9 AVITAMINOSIS D: Primary | ICD-10-CM

## 2023-09-12 LAB
25(OH)D3+25(OH)D2 SERPL-MCNC: 60 NG/ML (ref 30–96)
ALBUMIN SERPL BCP-MCNC: 3.8 G/DL (ref 3.5–5.2)
ANION GAP SERPL CALC-SCNC: 7 MMOL/L (ref 8–16)
BACTERIA #/AREA URNS HPF: NEGATIVE /HPF
BASOPHILS # BLD AUTO: 0.06 K/UL (ref 0–0.2)
BASOPHILS NFR BLD: 1 % (ref 0–1.9)
BILIRUB UR QL STRIP: NEGATIVE
BUN SERPL-MCNC: 26 MG/DL (ref 8–23)
CALCIUM SERPL-MCNC: 9.3 MG/DL (ref 8.7–10.5)
CHLORIDE SERPL-SCNC: 108 MMOL/L (ref 95–110)
CLARITY UR: CLEAR
CO2 SERPL-SCNC: 25 MMOL/L (ref 23–29)
COLOR UR: YELLOW
CREAT SERPL-MCNC: 1.4 MG/DL (ref 0.5–1.4)
DIFFERENTIAL METHOD: ABNORMAL
EOSINOPHIL # BLD AUTO: 0.1 K/UL (ref 0–0.5)
EOSINOPHIL NFR BLD: 2.1 % (ref 0–8)
ERYTHROCYTE [DISTWIDTH] IN BLOOD BY AUTOMATED COUNT: 13.5 % (ref 11.5–14.5)
EST. GFR  (NO RACE VARIABLE): 40.5 ML/MIN/1.73 M^2
GLUCOSE SERPL-MCNC: 122 MG/DL (ref 70–110)
GLUCOSE UR QL STRIP: NEGATIVE
HCT VFR BLD AUTO: 39.7 % (ref 37–48.5)
HGB BLD-MCNC: 12.7 G/DL (ref 12–16)
HGB UR QL STRIP: NEGATIVE
HYALINE CASTS #/AREA URNS LPF: 3 /LPF
IMM GRANULOCYTES # BLD AUTO: 0.02 K/UL (ref 0–0.04)
IMM GRANULOCYTES NFR BLD AUTO: 0.3 % (ref 0–0.5)
KETONES UR QL STRIP: NEGATIVE
LEUKOCYTE ESTERASE UR QL STRIP: ABNORMAL
LYMPHOCYTES # BLD AUTO: 1.9 K/UL (ref 1–4.8)
LYMPHOCYTES NFR BLD: 33 % (ref 18–48)
MCH RBC QN AUTO: 31.4 PG (ref 27–31)
MCHC RBC AUTO-ENTMCNC: 32 G/DL (ref 32–36)
MCV RBC AUTO: 98 FL (ref 82–98)
MICROSCOPIC COMMENT: ABNORMAL
MONOCYTES # BLD AUTO: 0.5 K/UL (ref 0.3–1)
MONOCYTES NFR BLD: 8 % (ref 4–15)
NEUTROPHILS # BLD AUTO: 3.2 K/UL (ref 1.8–7.7)
NEUTROPHILS NFR BLD: 55.6 % (ref 38–73)
NITRITE UR QL STRIP: NEGATIVE
NRBC BLD-RTO: 0 /100 WBC
PH UR STRIP: 6 [PH] (ref 5–8)
PHOSPHATE SERPL-MCNC: 3.5 MG/DL (ref 2.7–4.5)
PLATELET # BLD AUTO: 269 K/UL (ref 150–450)
PMV BLD AUTO: 10.8 FL (ref 9.2–12.9)
POTASSIUM SERPL-SCNC: 4.5 MMOL/L (ref 3.5–5.1)
PROT UR QL STRIP: NEGATIVE
PTH-INTACT SERPL-MCNC: 102 PG/ML (ref 9–77)
RBC # BLD AUTO: 4.05 M/UL (ref 4–5.4)
RBC #/AREA URNS HPF: 1 /HPF (ref 0–4)
SODIUM SERPL-SCNC: 140 MMOL/L (ref 136–145)
SP GR UR STRIP: 1.01 (ref 1–1.03)
SQUAMOUS #/AREA URNS HPF: 1 /HPF
URN SPEC COLLECT METH UR: ABNORMAL
UROBILINOGEN UR STRIP-ACNC: NEGATIVE EU/DL
WBC # BLD AUTO: 5.75 K/UL (ref 3.9–12.7)
WBC #/AREA URNS HPF: 3 /HPF (ref 0–5)

## 2023-09-12 PROCEDURE — 80069 RENAL FUNCTION PANEL: CPT | Performed by: INTERNAL MEDICINE

## 2023-09-12 PROCEDURE — 83970 ASSAY OF PARATHORMONE: CPT | Performed by: INTERNAL MEDICINE

## 2023-09-12 PROCEDURE — 36415 COLL VENOUS BLD VENIPUNCTURE: CPT | Performed by: INTERNAL MEDICINE

## 2023-09-12 PROCEDURE — 82306 VITAMIN D 25 HYDROXY: CPT | Performed by: INTERNAL MEDICINE

## 2023-09-12 PROCEDURE — 81001 URINALYSIS AUTO W/SCOPE: CPT | Performed by: INTERNAL MEDICINE

## 2023-09-12 PROCEDURE — 85025 COMPLETE CBC W/AUTO DIFF WBC: CPT | Performed by: INTERNAL MEDICINE

## 2023-11-28 ENCOUNTER — OFFICE VISIT (OUTPATIENT)
Dept: HEMATOLOGY/ONCOLOGY | Facility: CLINIC | Age: 68
End: 2023-11-28
Payer: MEDICARE

## 2023-11-28 VITALS
WEIGHT: 147.38 LBS | RESPIRATION RATE: 16 BRPM | BODY MASS INDEX: 28.56 KG/M2 | TEMPERATURE: 98 F | HEART RATE: 72 BPM | SYSTOLIC BLOOD PRESSURE: 146 MMHG | DIASTOLIC BLOOD PRESSURE: 70 MMHG

## 2023-11-28 DIAGNOSIS — C49.A2 GASTROINTESTINAL STROMAL TUMOR (GIST) OF STOMACH: Primary | ICD-10-CM

## 2023-11-28 DIAGNOSIS — R93.3 ABNORMAL COMPUTED TOMOGRAPHY OF STOMACH: ICD-10-CM

## 2023-11-28 DIAGNOSIS — K31.89 MASS OF STOMACH: ICD-10-CM

## 2023-11-28 PROCEDURE — 3078F DIAST BP <80 MM HG: CPT | Mod: CPTII,S$GLB,, | Performed by: INTERNAL MEDICINE

## 2023-11-28 PROCEDURE — 1101F PT FALLS ASSESS-DOCD LE1/YR: CPT | Mod: CPTII,S$GLB,, | Performed by: INTERNAL MEDICINE

## 2023-11-28 PROCEDURE — 1160F PR REVIEW ALL MEDS BY PRESCRIBER/CLIN PHARMACIST DOCUMENTED: ICD-10-PCS | Mod: CPTII,S$GLB,, | Performed by: INTERNAL MEDICINE

## 2023-11-28 PROCEDURE — 1159F MED LIST DOCD IN RCRD: CPT | Mod: CPTII,S$GLB,, | Performed by: INTERNAL MEDICINE

## 2023-11-28 PROCEDURE — 99214 OFFICE O/P EST MOD 30 MIN: CPT | Mod: S$GLB,,, | Performed by: INTERNAL MEDICINE

## 2023-11-28 PROCEDURE — 3288F PR FALLS RISK ASSESSMENT DOCUMENTED: ICD-10-PCS | Mod: CPTII,S$GLB,, | Performed by: INTERNAL MEDICINE

## 2023-11-28 PROCEDURE — 1101F PR PT FALLS ASSESS DOC 0-1 FALLS W/OUT INJ PAST YR: ICD-10-PCS | Mod: CPTII,S$GLB,, | Performed by: INTERNAL MEDICINE

## 2023-11-28 PROCEDURE — 1159F PR MEDICATION LIST DOCUMENTED IN MEDICAL RECORD: ICD-10-PCS | Mod: CPTII,S$GLB,, | Performed by: INTERNAL MEDICINE

## 2023-11-28 PROCEDURE — 3008F BODY MASS INDEX DOCD: CPT | Mod: CPTII,S$GLB,, | Performed by: INTERNAL MEDICINE

## 2023-11-28 PROCEDURE — 3008F PR BODY MASS INDEX (BMI) DOCUMENTED: ICD-10-PCS | Mod: CPTII,S$GLB,, | Performed by: INTERNAL MEDICINE

## 2023-11-28 PROCEDURE — 3077F SYST BP >= 140 MM HG: CPT | Mod: CPTII,S$GLB,, | Performed by: INTERNAL MEDICINE

## 2023-11-28 PROCEDURE — 3077F PR MOST RECENT SYSTOLIC BLOOD PRESSURE >= 140 MM HG: ICD-10-PCS | Mod: CPTII,S$GLB,, | Performed by: INTERNAL MEDICINE

## 2023-11-28 PROCEDURE — 1126F PR PAIN SEVERITY QUANTIFIED, NO PAIN PRESENT: ICD-10-PCS | Mod: CPTII,S$GLB,, | Performed by: INTERNAL MEDICINE

## 2023-11-28 PROCEDURE — 99214 PR OFFICE/OUTPT VISIT, EST, LEVL IV, 30-39 MIN: ICD-10-PCS | Mod: S$GLB,,, | Performed by: INTERNAL MEDICINE

## 2023-11-28 PROCEDURE — 3078F PR MOST RECENT DIASTOLIC BLOOD PRESSURE < 80 MM HG: ICD-10-PCS | Mod: CPTII,S$GLB,, | Performed by: INTERNAL MEDICINE

## 2023-11-28 PROCEDURE — 1160F RVW MEDS BY RX/DR IN RCRD: CPT | Mod: CPTII,S$GLB,, | Performed by: INTERNAL MEDICINE

## 2023-11-28 PROCEDURE — 3288F FALL RISK ASSESSMENT DOCD: CPT | Mod: CPTII,S$GLB,, | Performed by: INTERNAL MEDICINE

## 2023-11-28 PROCEDURE — 1126F AMNT PAIN NOTED NONE PRSNT: CPT | Mod: CPTII,S$GLB,, | Performed by: INTERNAL MEDICINE

## 2024-02-27 ENCOUNTER — HOSPITAL ENCOUNTER (OUTPATIENT)
Dept: PREADMISSION TESTING | Facility: HOSPITAL | Age: 69
Discharge: HOME | End: 2024-02-27
Attending: INTERNAL MEDICINE
Payer: MEDICARE

## 2024-02-27 VITALS
RESPIRATION RATE: 18 BRPM | OXYGEN SATURATION: 96 % | TEMPERATURE: 98 F | WEIGHT: 147.81 LBS | SYSTOLIC BLOOD PRESSURE: 139 MMHG | DIASTOLIC BLOOD PRESSURE: 80 MMHG | HEIGHT: 63 IN | BODY MASS INDEX: 26.19 KG/M2 | HEART RATE: 65 BPM

## 2024-02-27 DIAGNOSIS — Z01.818 PRE-OP TESTING: Primary | ICD-10-CM

## 2024-02-27 PROCEDURE — 93010 ELECTROCARDIOGRAM REPORT: CPT | Mod: ,,, | Performed by: GENERAL PRACTICE

## 2024-02-27 PROCEDURE — 93005 ELECTROCARDIOGRAM TRACING: CPT | Performed by: GENERAL PRACTICE

## 2024-02-27 NOTE — PRE-PROCEDURE INSTRUCTIONS
Medicines and history reviewed via ,  at side. Verbalized understanding of pre op  instructions. Escorted to the lab for bloodwork.

## 2024-02-27 NOTE — DISCHARGE INSTRUCTIONS
To confirm, Your procedure is scheduled for: Thursday, February 29, 2024    Endoscopy will call the afternoon prior with the final arrival time Wednesday, February 28, 2024    Please report to Outpatient [place] via [facility] entrance. Check in at registration desk.    Do not eat or drink anything after midnight the night before procedure.        DO NOT TAKE THESE MEDICATIONS PRIOR to your procedure or per your surgeon's request: ASPIRIN, ALEVE, ADVIL, IBUPROFEN, FISH OIL VITAMIN E, HERBALS  (May take Tylenol)        INSTRUCTIONS IMPORTANT!!      If you wear contact lenses, dentures, hearing aids or glasses, bring a container to put them in during surgery and give to a family member for safe keeping.     Please leave all jewelry, piercing's and valuables at home.     ONLY for patients requiring bowel prep, written instructions will be given by your doctor's office.    Make arrangements in advance for transportation home by a responsible adult.    You must make arrangements for transportation, TAXI'S, UBER'S OR LYFTS ARE NOT ALLOWED.        If you have any questions about these instructions, call Pre-Op Admit  Nursing at [phone] or the Pre-Op Endoscopy [phone]

## 2024-02-29 ENCOUNTER — HOSPITAL ENCOUNTER (OUTPATIENT)
Facility: HOSPITAL | Age: 69
Discharge: HOME | End: 2024-02-29
Attending: INTERNAL MEDICINE | Admitting: INTERNAL MEDICINE
Payer: MEDICARE

## 2024-02-29 ENCOUNTER — ANESTHESIA EVENT (OUTPATIENT)
Dept: SURGERY | Facility: HOSPITAL | Age: 69
End: 2024-02-29
Payer: MEDICARE

## 2024-02-29 ENCOUNTER — ANESTHESIA (OUTPATIENT)
Dept: SURGERY | Facility: HOSPITAL | Age: 69
End: 2024-02-29
Payer: MEDICARE

## 2024-02-29 VITALS
RESPIRATION RATE: 17 BRPM | SYSTOLIC BLOOD PRESSURE: 129 MMHG | OXYGEN SATURATION: 99 % | OXYGEN SATURATION: 100 % | DIASTOLIC BLOOD PRESSURE: 76 MMHG | HEART RATE: 66 BPM | TEMPERATURE: 97 F | HEART RATE: 73 BPM | DIASTOLIC BLOOD PRESSURE: 66 MMHG | RESPIRATION RATE: 10 BRPM | SYSTOLIC BLOOD PRESSURE: 158 MMHG

## 2024-02-29 DIAGNOSIS — Z86.010 PERSONAL HISTORY OF COLONIC POLYPS: ICD-10-CM

## 2024-02-29 PROCEDURE — 37000008 HC ANESTHESIA 1ST 15 MINUTES: Performed by: INTERNAL MEDICINE

## 2024-02-29 PROCEDURE — 25000003 PHARM REV CODE 250: Performed by: NURSE ANESTHETIST, CERTIFIED REGISTERED

## 2024-02-29 PROCEDURE — D9220A PRA ANESTHESIA: Mod: CRNA,,, | Performed by: NURSE ANESTHETIST, CERTIFIED REGISTERED

## 2024-02-29 PROCEDURE — 63600175 PHARM REV CODE 636 W HCPCS: Performed by: NURSE ANESTHETIST, CERTIFIED REGISTERED

## 2024-02-29 PROCEDURE — 37000009 HC ANESTHESIA EA ADD 15 MINS: Performed by: INTERNAL MEDICINE

## 2024-02-29 PROCEDURE — G0105 COLORECTAL SCRN; HI RISK IND: HCPCS | Performed by: INTERNAL MEDICINE

## 2024-02-29 PROCEDURE — 43450 DILATE ESOPHAGUS 1/MULT PASS: CPT | Performed by: INTERNAL MEDICINE

## 2024-02-29 PROCEDURE — D9220A PRA ANESTHESIA: Mod: ANES,,, | Performed by: ANESTHESIOLOGY

## 2024-02-29 RX ORDER — LABETALOL HYDROCHLORIDE 5 MG/ML
INJECTION, SOLUTION INTRAVENOUS
Status: DISCONTINUED | OUTPATIENT
Start: 2024-02-29 | End: 2024-02-29

## 2024-02-29 RX ORDER — PROPOFOL 10 MG/ML
VIAL (ML) INTRAVENOUS
Status: DISCONTINUED | OUTPATIENT
Start: 2024-02-29 | End: 2024-02-29

## 2024-02-29 RX ADMIN — PROPOFOL 30 MG: 10 INJECTION, EMULSION INTRAVENOUS at 09:02

## 2024-02-29 RX ADMIN — PROPOFOL 30 MG: 10 INJECTION, EMULSION INTRAVENOUS at 10:02

## 2024-02-29 RX ADMIN — LABETALOL HYDROCHLORIDE 10 MG: 5 INJECTION, SOLUTION INTRAVENOUS at 09:02

## 2024-02-29 RX ADMIN — SODIUM CHLORIDE: 900 INJECTION, SOLUTION INTRAVENOUS at 09:02

## 2024-02-29 RX ADMIN — PROPOFOL 100 MG: 10 INJECTION, EMULSION INTRAVENOUS at 09:02

## 2024-02-29 NOTE — TRANSFER OF CARE
Anesthesia Transfer of Care Note    Patient: Lorraine Warner    Procedure(s) Performed: Procedure(s) (LRB):  COLONOSCOPY (N/A)  EGD (ESOPHAGOGASTRODUODENOSCOPY) (N/A)    Patient location: PACU    Anesthesia Type: general    Transport from OR: Transported from OR on room air with adequate spontaneous ventilation    Post pain: adequate analgesia    Post assessment: no apparent anesthetic complications and tolerated procedure well    Post vital signs: stable    Level of consciousness: awake    Nausea/Vomiting: no nausea/vomiting    Complications: none    Transfer of care protocol was followed      Last vitals: Visit Vitals  BP (!) 176/83 (BP Location: Left arm)   Pulse 66   Temp 36.5 °C (97.7 °F) (Oral)   Resp 17   SpO2 98%

## 2024-02-29 NOTE — ANESTHESIA POSTPROCEDURE EVALUATION
Anesthesia Post Evaluation    Patient: Lorraine Warner    Procedure(s) Performed: Procedure(s) (LRB):  COLONOSCOPY (N/A)  EGD (ESOPHAGOGASTRODUODENOSCOPY) (N/A)    Final Anesthesia Type: general      Patient location during evaluation: GI PACU  Patient participation: Yes- Able to Participate  Level of consciousness: awake and alert and oriented  Post-procedure vital signs: reviewed and stable  Pain management: adequate  Airway patency: patent    PONV status at discharge: No PONV  Anesthetic complications: no      Cardiovascular status: blood pressure returned to baseline  Respiratory status: unassisted, spontaneous ventilation and room air  Hydration status: euvolemic  Follow-up not needed.              Vitals Value Taken Time   /79 02/29/24 1020   Temp 36.3 °C (97.3 °F) 02/29/24 1014   Pulse 67 02/29/24 1024   Resp 17 02/29/24 1014   SpO2 99 % 02/29/24 1024   Vitals shown include unvalidated device data.      No case tracking events are documented in the log.      Pain/Mily Score: No data recorded

## 2024-02-29 NOTE — [employer] PATIENT INSTRUCTIONS
Discharge Summary/Instructions after an Endoscopic Procedure  Patient Name: Lorraine Warner  Patient MRN: [id number]  Patient YOB: 1955 Thursday, February 29, 2024  Yunior Wong MD  RESTRICTIONS:  During your procedure today, you received medications for sedation.  These   medications may affect your judgment, balance and coordination.  Therefore,   for 24 hours, you have the following restrictions:   - DO NOT drive a car, operate machinery, make legal/financial decisions,   sign important papers or drink alcohol.    ACTIVITY:  Today: no heavy lifting, straining or running due to procedural   sedation/anesthesia.  The following day: return to full activity including work.  DIET:  Eat and drink normally unless instructed otherwise.     TREATMENT FOR COMMON SIDE EFFECTS:  - Mild abdominal pain, nausea, belching, bloating or excessive gas:  rest,   eat lightly and use a heating pad.  - Sore Throat: treat with throat lozenges and/or gargle with warm salt   water.  - Because air was used during the procedure, expelling large amounts of air   from your rectum or belching is normal.  - If a bowel prep was taken, you may not have a bowel movement for 1-3 days.    This is normal.  SYMPTOMS TO WATCH FOR AND REPORT TO YOUR PHYSICIAN:  1. Abdominal pain or bloating, other than gas cramps.  2. Chest pain.  3. Back pain.  4. Signs of infection such as: chills or fever occurring within 24 hours   after the procedure.  5. Rectal bleeding, which would show as bright red, maroon, or black stools.   (A tablespoon of blood from the rectum is not serious, especially if   hemorrhoids are present.)  6. Vomiting.  7. Weakness or dizziness.  GO DIRECTLY TO THE NEAREST EMERGENCY ROOM IF YOU HAVE ANY OF THE FOLLOWING:      Difficulty breathing              Chills and/or fever over 101 F   Persistent vomiting and/or vomiting blood   Severe abdominal pain   Severe chest pain   Black, tarry stools   Bleeding- more than one  tablespoon   Any other symptom or condition that you feel may need urgent attention  Your doctor recommends these additional instructions:  If any biopsies were taken, your doctors clinic will contact you in 1 to 2   weeks with any results.  - Patient has a contact number available for emergencies.  The signs and   symptoms of potential delayed complications were discussed with the   patient.  Return to normal activities tomorrow.  Written discharge   instructions were provided to the patient.   - Resume previous diet.   - Continue present medications.   - Discharge patient to home (with escort).  For questions, problems or results please call your physician - Yunior Wong MD at Work:  [phone].  [facility], EMERGENCY ROOM PHONE NUMBER: [phone]  IF A COMPLICATION OR EMERGENCY SITUATION ARISES AND YOU ARE UNABLE TO REACH   YOUR PHYSICIAN - GO DIRECTLY TO THE EMERGENCY ROOM.  MD Yunior Woodward MD  2/29/2024 10:19:17 AM  This report has been verified and signed electronically.  Dear patient,  As a result of recent federal legislation (The Federal Cures Act), you may   receive lab or pathology results from your procedure in your [facility]   account before your physician is able to contact you. Your physician or   their representative will relay the results to you with their   recommendations at their soonest availability.  Thank you,  [employer]

## 2024-02-29 NOTE — ANESTHESIA PREPROCEDURE EVALUATION
02/29/2024  Lorraine Warner is a 68 y.o., female.      Patient Active Problem List   Diagnosis    Abnormal computed tomography of stomach    Mass of stomach - cardia    Abnormal findings on esophagogastroduodenoscopy (EGD)    Gastrointestinal stromal tumor (GIST) of stomach    Disease of biliary tract    Hydronephrosis, right       Past Surgical History:   Procedure Laterality Date    ANGIOGRAM, CORONARY, WITH LEFT HEART CATHETERIZATION N/A 8/27/2019    Procedure: ANGIOGRAM,CORONARY,WITH LEFT HEART CATHETERIZATION;  Surgeon: Chandana Almeida MD;  Location: [facility] CATH/EP LAB;  Service: Cardiology;  Laterality: N/A;    BLADDER SURGERY      CORONARY STENT PLACEMENT  2018    CYSTOSCOPY W/ RETROGRADES Right 9/30/2022    Procedure: CYSTOSCOPY, WITH RETROGRADE PYELOGRAM;  Surgeon: Giovana Kennedy MD;  Location: [facility] OR;  Service: Urology;  Laterality: Right;    ENDOSCOPIC ULTRASOUND OF UPPER GASTROINTESTINAL TRACT  08/10/2021    ENDOSCOPIC ULTRASOUND OF UPPER GASTROINTESTINAL TRACT N/A 8/10/2021    Procedure: ULTRASOUND, UPPER GI TRACT, ENDOSCOPIC;  Surgeon: Kash Lyons III, MD;  Location: [facility] ENDO;  Service: Endoscopy;  Laterality: N/A;    LAPAROSCOPIC PARTIAL GASTRECTOMY  11/27/2018    Dr. Garcia     UPPER GASTROINTESTINAL ENDOSCOPY  08/10/2021        Tobacco Use:  The patient  reports that she has never smoked. She has never used smokeless tobacco.     Results for orders placed or performed during the hospital encounter of 02/27/24   EKG 12-lead    Collection Time: 02/27/24 11:14 AM   Result Value Ref Range    QRS Duration 76 ms    [facility] QTC Calculation 431 ms    Narrative    Test Reason : Z01.818,    Vent. Rate : 064 BPM     Atrial Rate : 064 BPM     P-R Int : 124 ms          QRS Dur : 076 ms      QT Int : 418 ms       P-R-T Axes : 063 052 061 degrees     QTc Int : 431 ms    Normal sinus  rhythm  Normal ECG  When compared with ECG of 30-SEP-2022 07:25,  No significant change was found    Referred By:             Confirmed By:              Lab Results   Component Value Date    WBC 6.58 02/27/2024    HGB 12.8 02/27/2024    HCT 39.5 02/27/2024    MCV 99 (H) 02/27/2024     02/27/2024     BMP  Lab Results   Component Value Date     02/27/2024    K 4.1 02/27/2024     02/27/2024    CO2 28 02/27/2024    BUN 19 02/27/2024    CREATININE 1.0 02/27/2024    CALCIUM 9.5 02/27/2024    ANIONGAP 7 (L) 02/27/2024    GLU 82 02/27/2024     (H) 09/12/2023     (H) 03/03/2023       No results found for this or any previous visit.              Pre-op Assessment    I have reviewed the Patient Summary Reports.     I have reviewed the Nursing Notes. I have reviewed the NPO Status.   I have reviewed the Medications.     Review of Systems  Anesthesia Hx:  No problems with previous Anesthesia             Denies Family Hx of Anesthesia complications.    Denies Personal Hx of Anesthesia complications.                    Social:  Non-Smoker       Hematology/Oncology:  Hematology Normal                       --  Cancer in past history (hx of GIST, s/p surgery):                     Cardiovascular:     Hypertension, well controlled  Past MI CAD (hx NSTEMI 2019)  asymptomatic CABG/stent (2019 stent)                                     Pulmonary:  Pulmonary Normal                       Renal/:  Chronic Renal Disease (R hydronephrosis)                Hepatic/GI:     GERD, well controlled             Musculoskeletal:  Musculoskeletal Normal                Neurological:  Neurology Normal                                      Endocrine:  Endocrine Normal                Physical Exam  General: Well nourished, Cooperative, Alert and Oriented    Airway:  Mallampati: III / II  Mouth Opening: Normal  TM Distance: Normal  Tongue: Normal  Neck ROM: Normal ROM    Chest/Lungs:  Clear to auscultation    Heart:  Rate:  Normal  Rhythm: Regular Rhythm  Sounds: Normal    Abdomen:  Normal, Soft, Nontender        Anesthesia Plan  Type of Anesthesia, risks & benefits discussed:    Anesthesia Type: Gen Natural Airway  Intra-op Monitoring Plan: Standard ASA Monitors  Post Op Pain Control Plan:   (medical reason for not using multimodal pain management)  Induction:  IV  Informed Consent: Informed consent signed with the Patient and all parties understand the risks and agree with anesthesia plan.  All questions answered. Patient consented to blood products? No  ASA Score: 3  Anesthesia Plan Notes:   General Natural Airway  Propofol  Zofran    Ready For Surgery From Anesthesia Perspective.     .

## 2024-02-29 NOTE — NURSING
"0814 Pre-procedure instructions and information as well as Procedure education performed with 2 RN'S and [facility] interpretor Jonathan. Questions encouraged and all questions answered. Pt denied pain.Pt verbalized understanding of educations and information with interpretor and 2 RN's.          1029 Pt discharge instructions from Physician and this nurse discussed with pt,pt's  and [facility] approved interpretor Anderson #[id number].Questions encouraged, and all questions answered. Pt's discharge instructions and AVS printed in Italian for pt understanding. Pt denied pain and was wheeled from Endo procedural unit by this nurse and into pt's POV.This nurse assisted with pt getting into car and buckled pt's seatbelt. Pt thanked this nurse and stated"GOB BLESS YOU."  "

## 2024-02-29 NOTE — [employer] PATIENT INSTRUCTIONS
Discharge Summary/Instructions after an Endoscopic Procedure  Patient Name: Lorraine Warner  Patient MRN: [id number]  Patient YOB: 1955 Thursday, February 29, 2024  Yunior Wong MD  RESTRICTIONS:  During your procedure today, you received medications for sedation.  These   medications may affect your judgment, balance and coordination.  Therefore,   for 24 hours, you have the following restrictions:   - DO NOT drive a car, operate machinery, make legal/financial decisions,   sign important papers or drink alcohol.    ACTIVITY:  Today: no heavy lifting, straining or running due to procedural   sedation/anesthesia.  The following day: return to full activity including work.  DIET:  Eat and drink normally unless instructed otherwise.     TREATMENT FOR COMMON SIDE EFFECTS:  - Mild abdominal pain, nausea, belching, bloating or excessive gas:  rest,   eat lightly and use a heating pad.  - Sore Throat: treat with throat lozenges and/or gargle with warm salt   water.  - Because air was used during the procedure, expelling large amounts of air   from your rectum or belching is normal.  - If a bowel prep was taken, you may not have a bowel movement for 1-3 days.    This is normal.  SYMPTOMS TO WATCH FOR AND REPORT TO YOUR PHYSICIAN:  1. Abdominal pain or bloating, other than gas cramps.  2. Chest pain.  3. Back pain.  4. Signs of infection such as: chills or fever occurring within 24 hours   after the procedure.  5. Rectal bleeding, which would show as bright red, maroon, or black stools.   (A tablespoon of blood from the rectum is not serious, especially if   hemorrhoids are present.)  6. Vomiting.  7. Weakness or dizziness.  GO DIRECTLY TO THE NEAREST EMERGENCY ROOM IF YOU HAVE ANY OF THE FOLLOWING:      Difficulty breathing              Chills and/or fever over 101 F   Persistent vomiting and/or vomiting blood   Severe abdominal pain   Severe chest pain   Black, tarry stools   Bleeding- more than one  tablespoon   Any other symptom or condition that you feel may need urgent attention  Your doctor recommends these additional instructions:  If any biopsies were taken, your doctors clinic will contact you in 1 to 2   weeks with any results.  - Patient has a contact number available for emergencies.  The signs and   symptoms of potential delayed complications were discussed with the   patient.  Return to normal activities tomorrow.  Written discharge   instructions were provided to the patient.   - Resume previous diet.   - Continue present medications.   - Repeat colonoscopy in 5 years for surveillance.   - Return to GI clinic PRN.   - Discharge patient to home (with escort).  For questions, problems or results please call your physician - Yunior Wong MD at Work:  [phone].  [facility], EMERGENCY ROOM PHONE NUMBER: [phone]  IF A COMPLICATION OR EMERGENCY SITUATION ARISES AND YOU ARE UNABLE TO REACH   YOUR PHYSICIAN - GO DIRECTLY TO THE EMERGENCY ROOM.  MD Yunior Woodward MD  2/29/2024 10:23:18 AM  This report has been verified and signed electronically.  Dear patient,  As a result of recent federal legislation (The Federal Cures Act), you may   receive lab or pathology results from your procedure in your [facility]   account before your physician is able to contact you. Your physician or   their representative will relay the results to you with their   recommendations at their soonest availability.  Thank you,  [employer]

## 2024-02-29 NOTE — H&P
GASTROENTEROLOGY PRE-PROCEDURE H&P NOTE  Patient Name: Lorraine Warner  Patient MRN: [id number]  Patient : 1955    Service date: 2024    PCP: Will Velarde MD    No chief complaint on file.      HPI: Patient is a 68 y.o. female with PMHx as below here for evaluation of       Lorraine Warner is a 68 year old female patient wtih history of chronic recurrent nagging epigastric luq discomfort characterized as throbbing associated with reflux. better in past on ppi.  no dysphagia.  Had negative ct with iv contrast  but did show moderate right hydronephrosis of undetermined etiology.  Recs by rads for ct urogram. had renal scan with lasix .    CT abd  unremarkable but without contrast. Renal function creat 1.4 .         Chart Review    21 HPI - 66 F h/o GIST, small HH with chronic mild intermittent nagging GERD. on ppi. reports better with prilosec.    admits to mild to moderate intermittent ruq pain. unclear onset. no recent lfts. normal eus in 2018.      21 HPI- 65 F with history of GIST post resection and negative f/u scans. had egd 1 year ago with small HH. ppi switched to panto and no issues. no complaints.    2020 hpi- 64 year old female (accompanied by her  who helps with interpretation) with GIST post resection/gleevec. doing well. gerd controlled. has some rare breakthrough.      Chart Review  2018 EUS- GIST.  2018 Colon- EWD- 3 mm polyp, repeat 5 years.  US 21 showed dilated CBD reaching 8 mm. GB normal  1/10/20 EGD- small HH, mild gastritis .     Past Medical History:  Past Medical History:   Diagnosis Date    Abnormal computed tomography of stomach 10/24/2018    Abnormal CT of the abdomen (soft tissue mass cardia of stomach) 10/24/2018    Chest pressure     @ rest within last week- no NTG taken - sees Dr. Almeida - seen last within month    Coronary artery disease     Gastrointestinal stromal tumor (GIST) of stomach 01/15/2019    GERD  (gastroesophageal reflux disease)     Hyperlipidemia     Hypertension     Mass of stomach - cardia 10/24/2018        Past Surgical History:  Past Surgical History:   Procedure Laterality Date    ANGIOGRAM, CORONARY, WITH LEFT HEART CATHETERIZATION N/A 8/27/2019    Procedure: ANGIOGRAM,CORONARY,WITH LEFT HEART CATHETERIZATION;  Surgeon: Chandana Almeida MD;  Location: [facility] CATH/EP LAB;  Service: Cardiology;  Laterality: N/A;    BLADDER SURGERY      CORONARY STENT PLACEMENT  2018    CYSTOSCOPY W/ RETROGRADES Right 9/30/2022    Procedure: CYSTOSCOPY, WITH RETROGRADE PYELOGRAM;  Surgeon: Giovana Kennedy MD;  Location: [facility] OR;  Service: Urology;  Laterality: Right;    ENDOSCOPIC ULTRASOUND OF UPPER GASTROINTESTINAL TRACT  08/10/2021    ENDOSCOPIC ULTRASOUND OF UPPER GASTROINTESTINAL TRACT N/A 8/10/2021    Procedure: ULTRASOUND, UPPER GI TRACT, ENDOSCOPIC;  Surgeon: Kash Lyons III, MD;  Location: [facility] ENDO;  Service: Endoscopy;  Laterality: N/A;    LAPAROSCOPIC PARTIAL GASTRECTOMY  11/27/2018    Dr. Garcia     UPPER GASTROINTESTINAL ENDOSCOPY  08/10/2021        Home Medications:  Medications Prior to Admission   Medication Sig Dispense Refill Last Dose    amLODIPine (NORVASC) 5 MG tablet Take 5 mg by mouth every evening.       aspirin (ECOTRIN) 81 MG EC tablet Take 81 mg by mouth once daily.       atorvastatin (LIPITOR) 40 MG tablet Take 40 mg by mouth every evening.        cholecalciferol, vitamin D3, (VITAMIN D3) 50 mcg (2,000 unit) Cap Take 1 capsule by mouth once daily.       metoprolol succinate (TOPROL-XL) 25 MG 24 hr tablet Take 50 mg by mouth every evening.       omeprazole (PRILOSEC) 40 MG capsule Take 40 mg by mouth every morning.       sucralfate (CARAFATE) 1 gram tablet Take 1 g by mouth 4 (four) times daily before meals and nightly.                  Review of patient's allergies indicates:  No Known Allergies    Social History:   Social History     Occupational History    Not on file  "  Tobacco Use    Smoking status: Never    Smokeless tobacco: Never   Substance and Sexual Activity    Alcohol use: No    Drug use: No    Sexual activity: Not on file       Family History:   Family History   Problem Relation Age of Onset    No Known Problems Mother     No Known Problems Father        Review of Systems:  A 10 point review of systems was performed and was normal, except as mentioned in the HPI, including constitutional, HEENT, heme, lymph, cardiovascular, respiratory, gastrointestinal, genitourinary, neurologic, endocrine, psychiatric and musculoskeletal.      OBJECTIVE:    Physical Exam:  24 Hour Vital Sign Ranges: Temp:  [97.7 °F (36.5 °C)] 97.7 °F (36.5 °C)  Pulse:  [66] 66  Resp:  [17] 17  SpO2:  [98 %] 98 %  BP: (176)/(83) 176/83  Most recent vitals: BP (!) 176/83 (BP Location: Left arm)   Pulse 66   Temp 97.7 °F (36.5 °C) (Oral)   Resp 17   SpO2 98% Comment: room air   GEN: well-developed, well-nourished, awake and alert, non-toxic appearing adult  HEENT: PERRL, sclera anicteric, oral mucosa pink and moist without lesion  NECK: trachea midline; Good ROM  CV: regular rate and rhythm, no murmurs or gallops  RESP: clear to auscultation bilaterally, no wheezes, rhonci or rales  ABD: soft, non-tender, non-distended, normal bowel sounds  EXT: no swelling or edema, 2+ pulses distally  SKIN: no rashes or jaundice  PSYCH: normal affect    Labs:   Recent Labs     02/27/24  1159   WBC 6.58   MCV 99*        Recent Labs     02/27/24  1159      K 4.1      CO2 28   BUN 19   GLU 82     No results for input(s): "ALB" in the last 72 hours.    Invalid input(s): "ALKP", "SGOT", "SGPT", "TBIL", "DBIL", "TPRO"  No results for input(s): "PT", "INR", "PTT" in the last 72 hours.      IMPRESSION / RECOMMENDATIONS:  H/o GIST  H/o polyps  EGD/Colon  with interventions as warranted.   RIsks, benefits, alternatives discussed in detail regarding upcoming procedures and sedation. Some of the more common " endoscopic complications include but not limited to immediate or delayed perforation, bleeding, infections, pain, inadvertent injury to surrounding tissue / organs and possible need for surgical evaluation. Patient expressed understanding, all questions answered and will proceed with procedure as planned.     Yunior Wong  2/29/2024  9:35 AM

## 2024-03-08 DIAGNOSIS — Z12.31 ENCOUNTER FOR SCREENING MAMMOGRAM FOR MALIGNANT NEOPLASM OF BREAST: Primary | ICD-10-CM

## 2024-03-11 ENCOUNTER — LAB VISIT (OUTPATIENT)
Dept: LAB | Facility: HOSPITAL | Age: 69
End: 2024-03-11
Attending: INTERNAL MEDICINE
Payer: MEDICARE

## 2024-03-11 DIAGNOSIS — R80.9 PROTEINURIA: ICD-10-CM

## 2024-03-11 DIAGNOSIS — N18.30 CHRONIC KIDNEY DISEASE, STAGE III (MODERATE): Primary | ICD-10-CM

## 2024-03-11 DIAGNOSIS — E55.9 AVITAMINOSIS D: ICD-10-CM

## 2024-03-11 LAB
25(OH)D3+25(OH)D2 SERPL-MCNC: 57 NG/ML (ref 30–96)
ALBUMIN SERPL BCP-MCNC: 4.1 G/DL (ref 3.5–5.2)
ALBUMIN SERPL BCP-MCNC: 4.1 G/DL (ref 3.5–5.2)
ANION GAP SERPL CALC-SCNC: 5 MMOL/L (ref 8–16)
ANION GAP SERPL CALC-SCNC: 5 MMOL/L (ref 8–16)
BASOPHILS # BLD AUTO: 0.06 K/UL (ref 0–0.2)
BASOPHILS NFR BLD: 0.9 % (ref 0–1.9)
BILIRUB UR QL STRIP: NEGATIVE
BUN SERPL-MCNC: 18 MG/DL (ref 8–23)
BUN SERPL-MCNC: 18 MG/DL (ref 8–23)
CALCIUM SERPL-MCNC: 9.7 MG/DL (ref 8.7–10.5)
CALCIUM SERPL-MCNC: 9.7 MG/DL (ref 8.7–10.5)
CHLORIDE SERPL-SCNC: 105 MMOL/L (ref 95–110)
CHLORIDE SERPL-SCNC: 105 MMOL/L (ref 95–110)
CLARITY UR: CLEAR
CO2 SERPL-SCNC: 29 MMOL/L (ref 23–29)
CO2 SERPL-SCNC: 29 MMOL/L (ref 23–29)
COLOR UR: COLORLESS
CREAT SERPL-MCNC: 1.1 MG/DL (ref 0.5–1.4)
CREAT SERPL-MCNC: 1.1 MG/DL (ref 0.5–1.4)
DIFFERENTIAL METHOD BLD: ABNORMAL
EOSINOPHIL # BLD AUTO: 0.1 K/UL (ref 0–0.5)
EOSINOPHIL NFR BLD: 1.3 % (ref 0–8)
ERYTHROCYTE [DISTWIDTH] IN BLOOD BY AUTOMATED COUNT: 13.3 % (ref 11.5–14.5)
EST. GFR  (NO RACE VARIABLE): 54.1 ML/MIN/1.73 M^2
EST. GFR  (NO RACE VARIABLE): 54.1 ML/MIN/1.73 M^2
GLUCOSE SERPL-MCNC: 82 MG/DL (ref 70–110)
GLUCOSE SERPL-MCNC: 82 MG/DL (ref 70–110)
GLUCOSE UR QL STRIP: NEGATIVE
HCT VFR BLD AUTO: 39.9 % (ref 37–48.5)
HGB BLD-MCNC: 12.8 G/DL (ref 12–16)
HGB UR QL STRIP: NEGATIVE
IMM GRANULOCYTES # BLD AUTO: 0.01 K/UL (ref 0–0.04)
IMM GRANULOCYTES NFR BLD AUTO: 0.1 % (ref 0–0.5)
KETONES UR QL STRIP: NEGATIVE
LEUKOCYTE ESTERASE UR QL STRIP: ABNORMAL
LYMPHOCYTES # BLD AUTO: 2.3 K/UL (ref 1–4.8)
LYMPHOCYTES NFR BLD: 33.9 % (ref 18–48)
MCH RBC QN AUTO: 31.8 PG (ref 27–31)
MCHC RBC AUTO-ENTMCNC: 32.1 G/DL (ref 32–36)
MCV RBC AUTO: 99 FL (ref 82–98)
MICROSCOPIC COMMENT: NORMAL
MONOCYTES # BLD AUTO: 0.6 K/UL (ref 0.3–1)
MONOCYTES NFR BLD: 8.3 % (ref 4–15)
NEUTROPHILS # BLD AUTO: 3.7 K/UL (ref 1.8–7.7)
NEUTROPHILS NFR BLD: 55.5 % (ref 38–73)
NITRITE UR QL STRIP: NEGATIVE
NRBC BLD-RTO: 0 /100 WBC
PH UR STRIP: 6 [PH] (ref 5–8)
PHOSPHATE SERPL-MCNC: 3.7 MG/DL (ref 2.7–4.5)
PHOSPHATE SERPL-MCNC: 3.7 MG/DL (ref 2.7–4.5)
PLATELET # BLD AUTO: 226 K/UL (ref 150–450)
PMV BLD AUTO: 10.9 FL (ref 9.2–12.9)
POTASSIUM SERPL-SCNC: 4.6 MMOL/L (ref 3.5–5.1)
POTASSIUM SERPL-SCNC: 4.6 MMOL/L (ref 3.5–5.1)
PROT UR QL STRIP: NEGATIVE
PROT UR-MCNC: 7 MG/DL (ref 6–15)
PTH-INTACT SERPL-MCNC: 99.5 PG/ML (ref 9–77)
RBC # BLD AUTO: 4.02 M/UL (ref 4–5.4)
RBC #/AREA URNS HPF: 0 /HPF (ref 0–4)
SODIUM SERPL-SCNC: 139 MMOL/L (ref 136–145)
SODIUM SERPL-SCNC: 139 MMOL/L (ref 136–145)
SP GR UR STRIP: 1 (ref 1–1.03)
URN SPEC COLLECT METH UR: ABNORMAL
UROBILINOGEN UR STRIP-ACNC: NEGATIVE EU/DL
WBC # BLD AUTO: 6.72 K/UL (ref 3.9–12.7)
WBC #/AREA URNS HPF: 1 /HPF (ref 0–5)

## 2024-03-11 PROCEDURE — 85025 COMPLETE CBC W/AUTO DIFF WBC: CPT | Performed by: INTERNAL MEDICINE

## 2024-03-11 PROCEDURE — 36415 COLL VENOUS BLD VENIPUNCTURE: CPT | Performed by: INTERNAL MEDICINE

## 2024-03-11 PROCEDURE — 84156 ASSAY OF PROTEIN URINE: CPT | Performed by: INTERNAL MEDICINE

## 2024-03-11 PROCEDURE — 80069 RENAL FUNCTION PANEL: CPT | Performed by: INTERNAL MEDICINE

## 2024-03-11 PROCEDURE — 83970 ASSAY OF PARATHORMONE: CPT | Performed by: INTERNAL MEDICINE

## 2024-03-11 PROCEDURE — 81001 URINALYSIS AUTO W/SCOPE: CPT | Performed by: INTERNAL MEDICINE

## 2024-03-11 PROCEDURE — 82306 VITAMIN D 25 HYDROXY: CPT | Performed by: INTERNAL MEDICINE

## 2024-03-12 ENCOUNTER — HOSPITAL ENCOUNTER (OUTPATIENT)
Dept: RADIOLOGY | Facility: HOSPITAL | Age: 69
Discharge: HOME | End: 2024-03-12
Payer: MEDICARE

## 2024-03-12 DIAGNOSIS — Z12.31 ENCOUNTER FOR SCREENING MAMMOGRAM FOR MALIGNANT NEOPLASM OF BREAST: ICD-10-CM

## 2024-03-12 PROCEDURE — 77067 SCR MAMMO BI INCL CAD: CPT | Mod: TC,PO

## 2024-03-19 DIAGNOSIS — M54.9 BACK PAIN, ACUTE: Primary | ICD-10-CM

## 2024-03-22 LAB
OHS QRS DURATION: 76 MS
OHS QTC CALCULATION: 431 MS

## 2024-03-26 ENCOUNTER — HOSPITAL ENCOUNTER (OUTPATIENT)
Dept: RADIOLOGY | Facility: HOSPITAL | Age: 69
Discharge: HOME | End: 2024-03-26
Attending: INTERNAL MEDICINE
Payer: MEDICARE

## 2024-03-26 DIAGNOSIS — M54.9 BACK PAIN, ACUTE: ICD-10-CM

## 2024-03-26 PROCEDURE — 76770 US EXAM ABDO BACK WALL COMP: CPT | Mod: TC,PO

## 2024-03-28 ENCOUNTER — HOSPITAL ENCOUNTER (OUTPATIENT)
Dept: RADIOLOGY | Facility: HOSPITAL | Age: 69
Discharge: HOME | End: 2024-03-28
Attending: INTERNAL MEDICINE
Payer: MEDICARE

## 2024-03-28 DIAGNOSIS — N13.30 HYDRONEPHROSIS, RIGHT: Primary | ICD-10-CM

## 2024-03-28 DIAGNOSIS — N13.30 HYDRONEPHROSIS, RIGHT: ICD-10-CM

## 2024-03-28 PROCEDURE — 25500020 PHARM REV CODE 255: Performed by: INTERNAL MEDICINE

## 2024-03-28 PROCEDURE — 74160 CT ABDOMEN W/CONTRAST: CPT | Mod: TC

## 2024-03-28 RX ADMIN — IOHEXOL 100 ML: 350 INJECTION, SOLUTION INTRAVENOUS at 05:03

## 2024-05-21 ENCOUNTER — HOSPITAL ENCOUNTER (EMERGENCY)
Facility: HOSPITAL | Age: 69
Discharge: HOME | End: 2024-05-22
Attending: EMERGENCY MEDICINE
Payer: MEDICARE

## 2024-05-21 DIAGNOSIS — I10 HYPERTENSION, UNSPECIFIED TYPE: Primary | ICD-10-CM

## 2024-05-21 DIAGNOSIS — I10 HYPERTENSION: ICD-10-CM

## 2024-05-21 DIAGNOSIS — R51.9 ACUTE NONINTRACTABLE HEADACHE, UNSPECIFIED HEADACHE TYPE: ICD-10-CM

## 2024-05-21 LAB
ALBUMIN SERPL BCP-MCNC: 4.2 G/DL (ref 3.5–5.2)
ALP SERPL-CCNC: 93 U/L (ref 55–135)
ALT SERPL W/O P-5'-P-CCNC: 17 U/L (ref 10–44)
ANION GAP SERPL CALC-SCNC: 8 MMOL/L (ref 8–16)
AST SERPL-CCNC: 22 U/L (ref 10–40)
BASOPHILS # BLD AUTO: 0.07 K/UL (ref 0–0.2)
BASOPHILS NFR BLD: 0.9 % (ref 0–1.9)
BILIRUB SERPL-MCNC: 0.5 MG/DL (ref 0.1–1)
BNP SERPL-MCNC: 106 PG/ML (ref 0–99)
BUN SERPL-MCNC: 19 MG/DL (ref 8–23)
CALCIUM SERPL-MCNC: 9 MG/DL (ref 8.7–10.5)
CHLORIDE SERPL-SCNC: 108 MMOL/L (ref 95–110)
CO2 SERPL-SCNC: 23 MMOL/L (ref 23–29)
CREAT SERPL-MCNC: 1.1 MG/DL (ref 0.5–1.4)
DIFFERENTIAL METHOD BLD: ABNORMAL
EOSINOPHIL # BLD AUTO: 0.1 K/UL (ref 0–0.5)
EOSINOPHIL NFR BLD: 1.8 % (ref 0–8)
ERYTHROCYTE [DISTWIDTH] IN BLOOD BY AUTOMATED COUNT: 13.2 % (ref 11.5–14.5)
EST. GFR  (NO RACE VARIABLE): 54.1 ML/MIN/1.73 M^2
GLUCOSE SERPL-MCNC: 92 MG/DL (ref 70–110)
HCT VFR BLD AUTO: 40.6 % (ref 37–48.5)
HGB BLD-MCNC: 13 G/DL (ref 12–16)
IMM GRANULOCYTES # BLD AUTO: 0.01 K/UL (ref 0–0.04)
IMM GRANULOCYTES NFR BLD AUTO: 0.1 % (ref 0–0.5)
LYMPHOCYTES # BLD AUTO: 3.8 K/UL (ref 1–4.8)
LYMPHOCYTES NFR BLD: 47.3 % (ref 18–48)
MAGNESIUM SERPL-MCNC: 2 MG/DL (ref 1.6–2.6)
MCH RBC QN AUTO: 31.4 PG (ref 27–31)
MCHC RBC AUTO-ENTMCNC: 32 G/DL (ref 32–36)
MCV RBC AUTO: 98 FL (ref 82–98)
MONOCYTES # BLD AUTO: 0.7 K/UL (ref 0.3–1)
MONOCYTES NFR BLD: 8.7 % (ref 4–15)
NEUTROPHILS # BLD AUTO: 3.3 K/UL (ref 1.8–7.7)
NEUTROPHILS NFR BLD: 41.2 % (ref 38–73)
NRBC BLD-RTO: 0 /100 WBC
PLATELET # BLD AUTO: 234 K/UL (ref 150–450)
PMV BLD AUTO: 10.6 FL (ref 9.2–12.9)
POTASSIUM SERPL-SCNC: 3.8 MMOL/L (ref 3.5–5.1)
PROT SERPL-MCNC: 7.2 G/DL (ref 6–8.4)
RBC # BLD AUTO: 4.14 M/UL (ref 4–5.4)
SODIUM SERPL-SCNC: 139 MMOL/L (ref 136–145)
TROPONIN I SERPL HS-MCNC: 2.7 PG/ML (ref 0–14.9)
WBC # BLD AUTO: 7.97 K/UL (ref 3.9–12.7)

## 2024-05-21 PROCEDURE — 93010 ELECTROCARDIOGRAM REPORT: CPT | Mod: ,,, | Performed by: GENERAL PRACTICE

## 2024-05-21 PROCEDURE — 84484 ASSAY OF TROPONIN QUANT: CPT

## 2024-05-21 PROCEDURE — 85025 COMPLETE CBC W/AUTO DIFF WBC: CPT

## 2024-05-21 PROCEDURE — 99285 EMERGENCY DEPT VISIT HI MDM: CPT | Mod: 25

## 2024-05-21 PROCEDURE — 96374 THER/PROPH/DIAG INJ IV PUSH: CPT

## 2024-05-21 PROCEDURE — 83880 ASSAY OF NATRIURETIC PEPTIDE: CPT

## 2024-05-21 PROCEDURE — 96375 TX/PRO/DX INJ NEW DRUG ADDON: CPT

## 2024-05-21 PROCEDURE — 80053 COMPREHEN METABOLIC PANEL: CPT

## 2024-05-21 PROCEDURE — 83735 ASSAY OF MAGNESIUM: CPT

## 2024-05-21 PROCEDURE — 93005 ELECTROCARDIOGRAM TRACING: CPT | Performed by: GENERAL PRACTICE

## 2024-05-22 VITALS
WEIGHT: 146 LBS | BODY MASS INDEX: 25.86 KG/M2 | SYSTOLIC BLOOD PRESSURE: 177 MMHG | TEMPERATURE: 98 F | HEART RATE: 71 BPM | DIASTOLIC BLOOD PRESSURE: 101 MMHG | RESPIRATION RATE: 16 BRPM | OXYGEN SATURATION: 100 %

## 2024-05-22 LAB — TROPONIN I SERPL HS-MCNC: 3.9 PG/ML (ref 0–14.9)

## 2024-05-22 PROCEDURE — 63600175 PHARM REV CODE 636 W HCPCS: Performed by: EMERGENCY MEDICINE

## 2024-05-22 PROCEDURE — 84484 ASSAY OF TROPONIN QUANT: CPT

## 2024-05-22 RX ORDER — HYDRALAZINE HYDROCHLORIDE 20 MG/ML
10 INJECTION INTRAMUSCULAR; INTRAVENOUS
Status: COMPLETED | OUTPATIENT
Start: 2024-05-22 | End: 2024-05-22

## 2024-05-22 RX ORDER — KETOROLAC TROMETHAMINE 30 MG/ML
10 INJECTION, SOLUTION INTRAMUSCULAR; INTRAVENOUS
Status: COMPLETED | OUTPATIENT
Start: 2024-05-22 | End: 2024-05-22

## 2024-05-22 RX ADMIN — HYDRALAZINE HYDROCHLORIDE 10 MG: 20 INJECTION INTRAMUSCULAR; INTRAVENOUS at 12:05

## 2024-05-22 RX ADMIN — KETOROLAC TROMETHAMINE 10 MG: 30 INJECTION, SOLUTION INTRAMUSCULAR; INTRAVENOUS at 12:05

## 2024-05-22 NOTE — FIRST PROVIDER EVALUATION
Medical screening examination initiated.  I have conducted a focused provider triage encounter, findings are as follows:    Brief history of present illness:  Patient presents to ED for concern for hypertension and headache that started around 6:00 p.m..  Patient denies chest pain or shortness of breath.  Patient denies lightheaded dizziness or changes in vision.  Patient denies numbness or tingling.    Vitals:    05/21/24 2015   BP: (!) 215/104   Pulse: 74   Resp: 17   Temp: 97.9 °F (36.6 °C)   SpO2: 98%   Weight: 66.2 kg (146 lb)       Pertinent physical exam:  Patient is awake and alert in oriented x3.  Patient has normal strength in bilateral upper and lower extremities.    Brief workup plan:  Labs, EKG, chest x-ray    Preliminary workup initiated; this workup will be continued and followed by the physician or advanced practice provider that is assigned to the patient when roomed.

## 2024-05-22 NOTE — ED PROVIDER NOTES
Encounter Date: 5/21/2024       History     Chief Complaint   Patient presents with    Hypertension     C/o headache and HTN that started approx 2hrs ago. No trauma. Long car ride from [place]. Limited english speaking.      Emergent eval of a 5-year-old female with history of gastric cancer, hypertension, CAD with 1 stent and prior MI presents to the ER due to a right frontal headache that began at 6:00 p.m. that has been 9/10 has improved to 7/10 after taking 1 extra strength Tylenol and elevated blood pressure reading she took her blood pressure and it was 230 systolic.  Patient and her  are Nepali-speaking only spoken to using  line they are driving from [facility] [place] [facility].  And stopped here due to the high blood pressure and headache.  She reports normally her blood pressure is well controlled around 120 systolic.  She denies any vision changes including blurred vision or double vision that has no facial droop or facial numbness no confusion expressive aphasia were slurring of speech no numbness tingling weakness in the extremities no chest pain or shortness of breath  She takes metoprolol 50 mg nightly had not taken yet tonight due to normally seen at 9:00 p.m. in her symptoms began at 4      Review of patient's allergies indicates:  No Known Allergies  Past Medical History:   Diagnosis Date    Abnormal computed tomography of stomach 10/24/2018    Abnormal CT of the abdomen (soft tissue mass cardia of stomach) 10/24/2018    Chest pressure     @ rest within last week- no NTG taken - sees Dr. Almeida - seen last within month    Coronary artery disease     Gastrointestinal stromal tumor (GIST) of stomach 01/15/2019    GERD (gastroesophageal reflux disease)     Hyperlipidemia     Hypertension     Mass of stomach - cardia 10/24/2018     Past Surgical History:   Procedure Laterality Date    ANGIOGRAM, CORONARY, WITH LEFT HEART CATHETERIZATION N/A 8/27/2019    Procedure: ANGIOGRAM,CORONARY,WITH LEFT  HEART CATHETERIZATION;  Surgeon: Chandana Almeida MD;  Location: [facility] CATH/EP LAB;  Service: Cardiology;  Laterality: N/A;    BLADDER SURGERY      COLONOSCOPY N/A 2/29/2024    Procedure: COLONOSCOPY;  Surgeon: Yunior Wong MD;  Location: [facility] ENDO;  Service: Endoscopy;  Laterality: N/A;    CORONARY STENT PLACEMENT  2018    CYSTOSCOPY W/ RETROGRADES Right 9/30/2022    Procedure: CYSTOSCOPY, WITH RETROGRADE PYELOGRAM;  Surgeon: Giovana Kennedy MD;  Location: [facility] OR;  Service: Urology;  Laterality: Right;    ENDOSCOPIC ULTRASOUND OF UPPER GASTROINTESTINAL TRACT  08/10/2021    ENDOSCOPIC ULTRASOUND OF UPPER GASTROINTESTINAL TRACT N/A 8/10/2021    Procedure: ULTRASOUND, UPPER GI TRACT, ENDOSCOPIC;  Surgeon: Kash Lyons III, MD;  Location: [facility];  Service: Endoscopy;  Laterality: N/A;    ESOPHAGOGASTRODUODENOSCOPY N/A 2/29/2024    Procedure: EGD (ESOPHAGOGASTRODUODENOSCOPY);  Surgeon: Yunior Wong MD;  Location: [facility];  Service: Endoscopy;  Laterality: N/A;    LAPAROSCOPIC PARTIAL GASTRECTOMY  11/27/2018    Dr. Garcia     UPPER GASTROINTESTINAL ENDOSCOPY  08/10/2021     Family History   Problem Relation Name Age of Onset    No Known Problems Mother      No Known Problems Father       Social History     Tobacco Use    Smoking status: Never    Smokeless tobacco: Never   Substance Use Topics    Alcohol use: No    Drug use: No     Review of Systems   Constitutional:  Negative for activity change, appetite change, chills, diaphoresis, fatigue and fever.   HENT:  Negative for congestion, postnasal drip, rhinorrhea and sore throat.    Eyes:  Negative for pain and visual disturbance.   Respiratory:  Negative for cough, chest tightness, shortness of breath, wheezing and stridor.    Cardiovascular:  Negative for chest pain and palpitations.   Gastrointestinal:  Negative for abdominal pain, nausea and vomiting.   Musculoskeletal:  Negative for back pain and neck pain.   Skin:  Negative for rash.    Neurological:  Positive for headaches. Negative for dizziness, seizures, syncope, facial asymmetry, speech difficulty, weakness, light-headedness and numbness.   Hematological:  Does not bruise/bleed easily.   Psychiatric/Behavioral:  Negative for agitation and confusion. The patient is not nervous/anxious.    All other systems reviewed and are negative.      Physical Exam     Initial Vitals [05/21/24 2015]   BP Pulse Resp Temp SpO2   (!) 215/104 74 17 97.9 °F (36.6 °C) 98 %      MAP       --         Physical Exam    Nursing note and vitals reviewed.  Constitutional: She appears well-developed and well-nourished. She is not diaphoretic. No distress.   HENT:   Head: Normocephalic and atraumatic.   Right Ear: External ear normal.   Left Ear: External ear normal.   Nose: Nose normal.   Mouth/Throat: Oropharynx is clear and moist.   Eyes: Conjunctivae and EOM are normal. Pupils are equal, round, and reactive to light.   Neck: Neck supple. No tracheal deviation present.   Normal range of motion.  Cardiovascular:  Normal rate, regular rhythm, normal heart sounds and intact distal pulses.     Exam reveals no gallop and no friction rub.       No murmur heard.  203/85 pulse 66   Pulmonary/Chest: Breath sounds normal. No stridor. No respiratory distress. She has no wheezes. She has no rhonchi. She has no rales. She exhibits no tenderness.   Abdominal: Abdomen is soft. Bowel sounds are normal. She exhibits no distension and no mass. There is no abdominal tenderness. There is no rebound and no guarding.   Musculoskeletal:         General: No edema. Normal range of motion.      Cervical back: Normal range of motion and neck supple.     Neurological: She is alert and oriented to person, place, and time. She has normal strength. No cranial nerve deficit or sensory deficit.   Pt is neurovascularly intact with GCS: 15,  CN 2-12 grossly intact. No facial asymmetry. Normal speech without slurring.  5/5 strength in bilateral lower  extremities including hip flexion,  dorsal and plantar flexion, EHL and FLH, and bilateral upper extremities including biceps, triceps, wrist flexion and extension, and RUM nerves.  Normal sensation to light touch in all 4 extremities. No pronator drift.      Skin: Skin is warm and dry. No rash noted. No erythema. No pallor.   Psychiatric: She has a normal mood and affect. Her behavior is normal. Judgment and thought content normal.         ED Course   Procedures  Labs Reviewed   CBC W/ AUTO DIFFERENTIAL - Abnormal; Notable for the following components:       Result Value    MCH 31.4 (*)     All other components within normal limits   COMPREHENSIVE METABOLIC PANEL - Abnormal; Notable for the following components:    eGFR 54.1 (*)     All other components within normal limits   B-TYPE NATRIURETIC PEPTIDE - Abnormal; Notable for the following components:     (*)     All other components within normal limits   MAGNESIUM   TROPONIN I HIGH SENSITIVITY   TROPONIN I HIGH SENSITIVITY     EKG Readings: (Independently Interpreted)   Initial Reading: No STEMI. Rhythm: Normal Sinus Rhythm. Heart Rate: 65. Ectopy: No Ectopy. Conduction: Normal.       Imaging Results              CT Head Without Contrast (Final result)  Result time 05/21/24 22:14:18      Final result by Josep Lyn MD (05/21/24 22:14:18)                   Impression:      No acute abnormality.      Electronically signed by: Josep Lyn  Date:    05/21/2024  Time:    22:14               Narrative:    EXAMINATION:  CT HEAD WITHOUT CONTRAST    CLINICAL HISTORY:  Facial trauma, penetrating;Headache, new or worsening (Age >= 50y);    TECHNIQUE:  Low dose axial CT images obtained throughout the head without intravenous contrast. Sagittal and coronal reconstructions were performed.    COMPARISON:  None.    FINDINGS:  Intracranial compartment:    Ventricles and sulci are normal in size for age without evidence of hydrocephalus. No extra-axial  blood or fluid collections.    Old right basal ganglia lacunar infarct or prominent perivascular space.  No parenchymal mass, hemorrhage, edema or major vascular distribution infarct.    Skull/extracranial contents (limited evaluation): No fracture.  Right maxillary sinus polyp or mucous retention cyst.  Mastoid air cells and paranasal sinuses are otherwise essentially clear.                                       X-Ray Chest PA And Lateral (Final result)  Result time 05/21/24 21:04:42   Procedure changed from X-Ray Chest AP Portable     Final result by Josep Lyn MD (05/21/24 21:04:42)                   Impression:      No acute findings.      Electronically signed by: Josep Lyn  Date:    05/21/2024  Time:    21:04               Narrative:    EXAMINATION:  XR CHEST PA AND LATERAL    CLINICAL HISTORY:  Hypertension;    TECHNIQUE:  PA and lateral views of the chest were performed.    COMPARISON:  07/29/2022    FINDINGS:  Lungs are clear. No focal consolidation. No pleural effusion. No pneumothorax. Normal heart size.                                    X-Rays:   Independently Interpreted Readings:   Chest X-Ray: Normal heart size.  No infiltrates.  No acute abnormalities.     Medications   hydrALAZINE injection 10 mg (10 mg Intravenous Given 5/22/24 0059)   ketorolac injection 9.999 mg (9.999 mg Intravenous Given 5/22/24 0059)     Medical Decision Making  Emergent eval of a 5-year-old female with history of gastric cancer, hypertension, CAD with 1 stent and prior MI presents to the ER due to a right frontal headache that began at 6:00 p.m. that has been 9/10 has improved to 7/10 after taking 1 extra strength Tylenol and elevated blood pressure reading she took her blood pressure and it was 230 systolic.  Patient and her  are Georgian-speaking only spoken to using  line they are driving from [facility].  And stopped here due to the high blood pressure and headache.  She reports  normally her blood pressure is well controlled around 120 systolic.  She denies any vision changes including blurred vision or double vision that has no facial droop or facial numbness no confusion expressive aphasia were slurring of speech no numbness tingling weakness in the extremities no chest pain or shortness of breath  On physical exam patient was well-appearing in no distress blood pressure 203/85 other vitals are normal heart rate is 66  MDM    Patient presents for emergent evaluation of acute right-sided frontal headache and elevated blood pressure reading had not taken nighttime metoprolol traveling from [place] that poses a threat to life and/or bodily function.   Differential diagnosis includes but was not limited to hypertensive headache, intracranial bleeding CVA hypertensive encephalopathy.   In the ED patient found to have acute headache, hypertension.    I ordered labs and personally reviewed them.  Labs significant for see below  I ordered X-rays and personally reviewed them and reviewed the radiologist interpretation.  Xray significant for see above.    I ordered EKG and personally reviewed it.  EKG significant for see above.    I ordered CT scan and personally reviewed it and reviewed the radiologist interpretation.  CT significant for CT head no acute abnormality.  NIH stroke scale of 0      Discharge [facility]     Patient was managed in the ED with IV hydralazine 10 mg, Toradol 10 mg.  Patient also took her home metoprolol XL 50 mg which she was not taken tonight  The response to treatment was good.  Ambulates restroom and back with steady gait.  Overall well-appearing at this time blood pressure 177/101 will discharge home to take home meds  Patient was discharged in stable condition.  Detailed return precautions discussed.  Patient was told to follow up with primary care physician or specialist based on their diagnosis  Jes Garcia MD      Amount and/or Complexity of Data Reviewed  Labs:  ordered.     Details: Troponin negative x2    Normal CBC and CMP  Mag 2  Radiology: ordered and independent interpretation performed.  ECG/medicine tests: ordered and independent interpretation performed.    Risk  Prescription drug management.                                      Clinical Impression:  Final diagnoses:  [I10] Hypertension  [I10] Hypertension, unspecified type (Primary)  [R51.9] Acute nonintractable headache, unspecified headache type          ED Disposition Condition    Discharge Stable          ED Prescriptions    None       Follow-up Information       Follow up With Specialties Details Why Contact Info Additional Information    [facility] - Emergency Dept Emergency Medicine Go to  If symptoms worsen [address] [facility] [id number] [phone] 1st floor    Will Velarde MD  Schedule an appointment as soon as possible for a visit  If your symptoms do not improve [address] [facility] [address]  [phone]                Jes Garcia MD  05/22/24 1092

## 2024-05-23 ENCOUNTER — TELEPHONE (OUTPATIENT)
Facility: CLINIC | Age: 69
End: 2024-05-23
Payer: MEDICARE

## 2024-05-23 LAB
OHS QRS DURATION: 76 MS
OHS QTC CALCULATION: 440 MS

## 2024-05-23 NOTE — TELEPHONE ENCOUNTER
----- Message from Lizzy Burnette NP sent at 5/23/2024  1:25 PM CDT -----  No that is fine  ----- Message -----  From: Vera Fajardo RN  Sent: 5/23/2024  12:13 PM CDT  To: Lizzy Burnette NP    No mention in notes about how often needs labs. Just had CBC/CMP done with Dr. Shelby on 5/21, she shouldn't need any additional labs for appt on 5/28, correct?  ----- Message -----  From: Arin Chiang  Sent: 5/22/2024   9:20 AM CDT  To: [facility] Thomas Staff    Pt's  is calling to see if she is needing labs prior to her appt on 05/28     [phone]

## 2024-05-23 NOTE — TELEPHONE ENCOUNTER
Spoke to patient  made aware taht will not need any additional labs prior to scheduled appts. Verbalized understanding.

## 2024-05-25 ENCOUNTER — HOSPITAL ENCOUNTER (EMERGENCY)
Facility: HOSPITAL | Age: 69
Discharge: HOME | End: 2024-05-25
Attending: STUDENT IN AN ORGANIZED HEALTH CARE EDUCATION/TRAINING PROGRAM
Payer: MEDICARE

## 2024-05-25 VITALS
HEIGHT: 63 IN | WEIGHT: 153 LBS | OXYGEN SATURATION: 100 % | SYSTOLIC BLOOD PRESSURE: 202 MMHG | RESPIRATION RATE: 16 BRPM | BODY MASS INDEX: 27.11 KG/M2 | DIASTOLIC BLOOD PRESSURE: 83 MMHG | TEMPERATURE: 98 F | HEART RATE: 66 BPM

## 2024-05-25 DIAGNOSIS — I10 HYPERTENSION: ICD-10-CM

## 2024-05-25 LAB
ALBUMIN SERPL BCP-MCNC: 4.5 G/DL (ref 3.5–5.2)
ALP SERPL-CCNC: 91 U/L (ref 55–135)
ALT SERPL W/O P-5'-P-CCNC: 18 U/L (ref 10–44)
ANION GAP SERPL CALC-SCNC: 7 MMOL/L (ref 8–16)
AST SERPL-CCNC: 21 U/L (ref 10–40)
BASOPHILS # BLD AUTO: 0.05 K/UL (ref 0–0.2)
BASOPHILS NFR BLD: 0.7 % (ref 0–1.9)
BILIRUB SERPL-MCNC: 0.4 MG/DL (ref 0.1–1)
BNP SERPL-MCNC: 51 PG/ML (ref 0–99)
BUN SERPL-MCNC: 28 MG/DL (ref 8–23)
CALCIUM SERPL-MCNC: 9.9 MG/DL (ref 8.7–10.5)
CHLORIDE SERPL-SCNC: 106 MMOL/L (ref 95–110)
CO2 SERPL-SCNC: 26 MMOL/L (ref 23–29)
CREAT SERPL-MCNC: 1.1 MG/DL (ref 0.5–1.4)
DIFFERENTIAL METHOD BLD: ABNORMAL
EOSINOPHIL # BLD AUTO: 0.1 K/UL (ref 0–0.5)
EOSINOPHIL NFR BLD: 1.3 % (ref 0–8)
ERYTHROCYTE [DISTWIDTH] IN BLOOD BY AUTOMATED COUNT: 13.3 % (ref 11.5–14.5)
EST. GFR  (NO RACE VARIABLE): 54.1 ML/MIN/1.73 M^2
GLUCOSE SERPL-MCNC: 99 MG/DL (ref 70–110)
HCT VFR BLD AUTO: 42.7 % (ref 37–48.5)
HGB BLD-MCNC: 13.6 G/DL (ref 12–16)
IMM GRANULOCYTES # BLD AUTO: 0.02 K/UL (ref 0–0.04)
IMM GRANULOCYTES NFR BLD AUTO: 0.3 % (ref 0–0.5)
LYMPHOCYTES # BLD AUTO: 2.3 K/UL (ref 1–4.8)
LYMPHOCYTES NFR BLD: 34.1 % (ref 18–48)
MCH RBC QN AUTO: 31.4 PG (ref 27–31)
MCHC RBC AUTO-ENTMCNC: 31.9 G/DL (ref 32–36)
MCV RBC AUTO: 99 FL (ref 82–98)
MONOCYTES # BLD AUTO: 0.5 K/UL (ref 0.3–1)
MONOCYTES NFR BLD: 7.7 % (ref 4–15)
NEUTROPHILS # BLD AUTO: 3.8 K/UL (ref 1.8–7.7)
NEUTROPHILS NFR BLD: 55.9 % (ref 38–73)
NRBC BLD-RTO: 0 /100 WBC
PLATELET # BLD AUTO: 230 K/UL (ref 150–450)
PMV BLD AUTO: 10.7 FL (ref 9.2–12.9)
POTASSIUM SERPL-SCNC: 4.3 MMOL/L (ref 3.5–5.1)
PROT SERPL-MCNC: 7.7 G/DL (ref 6–8.4)
RBC # BLD AUTO: 4.33 M/UL (ref 4–5.4)
SODIUM SERPL-SCNC: 139 MMOL/L (ref 136–145)
TROPONIN I SERPL HS-MCNC: 5.8 PG/ML (ref 0–14.9)
WBC # BLD AUTO: 6.77 K/UL (ref 3.9–12.7)

## 2024-05-25 PROCEDURE — 84484 ASSAY OF TROPONIN QUANT: CPT | Performed by: PHYSICIAN ASSISTANT

## 2024-05-25 PROCEDURE — 93010 ELECTROCARDIOGRAM REPORT: CPT | Mod: ,,, | Performed by: INTERNAL MEDICINE

## 2024-05-25 PROCEDURE — 93005 ELECTROCARDIOGRAM TRACING: CPT | Performed by: INTERNAL MEDICINE

## 2024-05-25 PROCEDURE — 99284 EMERGENCY DEPT VISIT MOD MDM: CPT | Mod: 25

## 2024-05-25 PROCEDURE — 85025 COMPLETE CBC W/AUTO DIFF WBC: CPT | Performed by: PHYSICIAN ASSISTANT

## 2024-05-25 PROCEDURE — 80053 COMPREHEN METABOLIC PANEL: CPT | Performed by: PHYSICIAN ASSISTANT

## 2024-05-25 PROCEDURE — 83880 ASSAY OF NATRIURETIC PEPTIDE: CPT | Performed by: PHYSICIAN ASSISTANT

## 2024-05-25 NOTE — FIRST PROVIDER EVALUATION
Emergency Department TeleTriage Encounter Note      CHIEF COMPLAINT    Chief Complaint   Patient presents with    Hypertension     X ONSET TUES    Headache       VITAL SIGNS   Initial Vitals [05/25/24 1823]   BP Pulse Resp Temp SpO2   (!) 223/94 76 18 97.9 °F (36.6 °C) 97 %      MAP       --            ALLERGIES    Review of patient's allergies indicates:  No Known Allergies    PROVIDER TRIAGE NOTE  Patient presents with headache and elevated blood pressure per . Patient's  is interpreting for her, but when I asked him to ask her about other symptoms he refused and only answered that she has only complained about a headache.       ORDERS  Labs Reviewed - No data to display    ED Orders (720h ago, onward)      None              Virtual Visit Note: The provider triage portion of this emergency department evaluation and documentation was performed via [employer], a HIPAA-compliant telemedicine application, in concert with a tele-presenter in the room. A face to face patient evaluation with one of my colleagues will occur once the patient is placed in an emergency department room.      DISCLAIMER: This note was prepared with M*MYFLY voice recognition transcription software. Garbled syntax, mangled pronouns, and other bizarre constructions may be attributed to that software system.

## 2024-05-26 NOTE — ED PROVIDER NOTES
Encounter Date: 5/25/2024       History     Chief Complaint   Patient presents with    Hypertension     X ONSET TUES    Headache     HPI    68-year-old female with history of hypertension presenting with persistently elevated blood pressure for about 1 week.  Patient states that she sees her cardiologist and primary care doctor for blood pressure and has been taking medication.  States that her blood pressure has been around 180 systolic now since Tuesday.  Denies any associated chest pain, shortness of breath, confusion, change in urination, abdominal pain, or any other symptoms.  Patient was concerned about the elevated blood pressure and came to seek more medical advice.    Review of patient's allergies indicates:  No Known Allergies  Past Medical History:   Diagnosis Date    Abnormal computed tomography of stomach 10/24/2018    Abnormal CT of the abdomen (soft tissue mass cardia of stomach) 10/24/2018    Chest pressure     @ rest within last week- no NTG taken - sees Dr. Almeida - seen last within month    Coronary artery disease     Gastrointestinal stromal tumor (GIST) of stomach 01/15/2019    GERD (gastroesophageal reflux disease)     Hyperlipidemia     Hypertension     Mass of stomach - cardia 10/24/2018     Past Surgical History:   Procedure Laterality Date    ANGIOGRAM, CORONARY, WITH LEFT HEART CATHETERIZATION N/A 8/27/2019    Procedure: ANGIOGRAM,CORONARY,WITH LEFT HEART CATHETERIZATION;  Surgeon: Chandana Almeida MD;  Location: [facility] CATH/EP LAB;  Service: Cardiology;  Laterality: N/A;    BLADDER SURGERY      COLONOSCOPY N/A 2/29/2024    Procedure: COLONOSCOPY;  Surgeon: Yunior Wong MD;  Location: [facility] ENDO;  Service: Endoscopy;  Laterality: N/A;    CORONARY STENT PLACEMENT  2018    CYSTOSCOPY W/ RETROGRADES Right 9/30/2022    Procedure: CYSTOSCOPY, WITH RETROGRADE PYELOGRAM;  Surgeon: Giovana Kennedy MD;  Location: [facility] OR;  Service: Urology;  Laterality: Right;    ENDOSCOPIC ULTRASOUND OF  UPPER GASTROINTESTINAL TRACT  08/10/2021    ENDOSCOPIC ULTRASOUND OF UPPER GASTROINTESTINAL TRACT N/A 8/10/2021    Procedure: ULTRASOUND, UPPER GI TRACT, ENDOSCOPIC;  Surgeon: Kash Lyons III, MD;  Location: [facility];  Service: Endoscopy;  Laterality: N/A;    ESOPHAGOGASTRODUODENOSCOPY N/A 2/29/2024    Procedure: EGD (ESOPHAGOGASTRODUODENOSCOPY);  Surgeon: Yunior Wnog MD;  Location: [facility];  Service: Endoscopy;  Laterality: N/A;    LAPAROSCOPIC PARTIAL GASTRECTOMY  11/27/2018    Dr. Garcia     UPPER GASTROINTESTINAL ENDOSCOPY  08/10/2021     Family History   Problem Relation Name Age of Onset    No Known Problems Mother      No Known Problems Father       Social History     Tobacco Use    Smoking status: Never    Smokeless tobacco: Never   Substance Use Topics    Alcohol use: No    Drug use: No     Review of Systems  As above  Physical Exam     Initial Vitals [05/25/24 1823]   BP Pulse Resp Temp SpO2   (!) 223/94 76 18 97.9 °F (36.6 °C) 97 %      MAP       --         Physical Exam    Constitutional: She appears well-developed and well-nourished. She is not diaphoretic. No distress.   Eyes: Conjunctivae and EOM are normal. Pupils are equal, round, and reactive to light.   Cardiovascular:  Normal rate.           No murmur heard.  Pulmonary/Chest: Breath sounds normal. No respiratory distress. She has no wheezes.   Abdominal: Abdomen is soft. She exhibits no distension. There is no abdominal tenderness.   Musculoskeletal:         General: No tenderness or edema. Normal range of motion.     Neurological: She is alert and oriented to person, place, and time. She has normal strength.   Skin: Skin is warm. Capillary refill takes less than 2 seconds.         ED Course   Procedures  Labs Reviewed   CBC W/ AUTO DIFFERENTIAL - Abnormal; Notable for the following components:       Result Value    MCV 99 (*)     MCH 31.4 (*)     MCHC 31.9 (*)     All other components within normal limits   COMPREHENSIVE  METABOLIC PANEL - Abnormal; Notable for the following components:    BUN 28 (*)     eGFR 54.1 (*)     Anion Gap 7 (*)     All other components within normal limits   B-TYPE NATRIURETIC PEPTIDE   TROPONIN I HIGH SENSITIVITY     EKG Readings: (Independently Interpreted)   EKG interpreted by me with normal sinus rhythm, normal axis, normal intervals, no significant ST elevation or depression       Imaging Results    None          Medications - No data to display  Medical Decision Making  68-year-old female presenting with elevated blood pressure.  Denies any chest pain, shortness of breath, confusion or any other symptoms.  Reports adherence to her home medication.  Blood pressure initially 223/94.  Afebrile.  On exam patient appears well.  Patient has follow-up next week with Cardiology.  Labs here do not show any evidence of end-organ damage.  Creatinine 1.1, BNP and troponin within normal limits.  Lungs were clear on exam.  EKG without evidence of ischemia.  Explained to her the importance of blood pressure control over time but does not need emergent lowering.  Advised to continue medication and offered to initiate new medication but patient would rather see cardiologist next week for ongoing management.  I think this is reasonable given that patient is asymptomatic.  Return precautions were discussed including severe headache, shortness of breath, confusion, chest pain, any other new symptoms.    Dylan Roblero MD  Emergency Medicine      Amount and/or Complexity of Data Reviewed  Labs:  Decision-making details documented in ED Course.               ED Course as of 05/25/24 2131   Sat May 25, 2024   1956 EKG interpreted by me with normal sinus rhythm, normal axis, normal intervals, no significant ST elevation or depression. [KH]   1956 Creatinine: 1.1 [KH]   1957 Sodium: 139 [KH]   1957 Hemoglobin: 13.6 [KH]   2127 Creatinine: 1.1 [KH]   2127 Sodium: 139 [KH]   2127 BNP: 51 [KH]   2127 Troponin I High Sensitivity:  5.8 [KH]      ED Course User Index  [KH] Dylan Roblero MD                           Clinical Impression:  Final diagnoses:  [I10] Hypertension                 Dylan Roblero MD  05/25/24 2131       Dylan Roblero MD  05/25/24 2145

## 2024-05-26 NOTE — DISCHARGE INSTRUCTIONS
Recommend follow-up with your cardiologist as scheduled next week for further adjustment of blood pressure medication.  Please return within 24 hours if you have concerns of any  worsening symptoms.

## 2024-05-27 NOTE — PROGRESS NOTES
[facility] Hematology/Oncology  PROGRESS NOTE -  Follow-up Visit      Subjective:       Patient ID:   NAME: Lorraine Warner : 1955     69 y.o. female    Referring Doc: Judith  Other Physicians: Azeb Kumar    Chief Complaint:  gastric submucosal nodule/GIST f/u        History of Present Illness:     Patient returns today for a  regularly scheduled follow-up visit.  The patient is here today to go over the results of the recently ordered labs, tests and studies. She is here with her .Used [employer] services.     She has had some HBP issues as of lately - she has been to the hospital twice and is followed by Dr Almeida and sees him again on 2024    She had scope again in 2024 with Dr Wong  which was good per patient    She last had CT scans in 2024    She is otherwise doing ok with no new issues.   She denies any CP, SOB, HA's or N/V.         Discussed covid19 precautions - she has not been vaccinated; she had covid in Dec 2021 and recovered      ROS:   GEN: normal without any fever, night sweats or weight loss  HEENT: normal with no HA's, sore throat, stiff neck, changes in vision  CV: normal with no CP, SOB, PND, MARSHALL or orthopnea  PULM: normal with no SOB, cough, hemoptysis, sputum or pleuritic pain  GI: normal with no abdominal pain, nausea, vomiting, constipation, diarrhea, melanotic stools, BRBPR, or hematemesis; some reflux residual but better  : normal with no hematuria, dysuria  BREAST: normal with no mass, discharge, pain  SKIN: normal with no rash, erythema, bruising, or swelling    Allergies:  Review of patient's allergies indicates:  No Known Allergies    Medications:    Current Outpatient Medications:     amLODIPine (NORVASC) 5 MG tablet, Take 5 mg by mouth every evening., Disp: , Rfl:     aspirin (ECOTRIN) 81 MG EC tablet, Take 81 mg by mouth once daily., Disp: , Rfl:     atorvastatin (LIPITOR) 40 MG tablet, Take 40 mg by mouth every evening. ,  "Disp: , Rfl:     cholecalciferol, vitamin D3, (VITAMIN D3) 50 mcg (2,000 unit) Cap, Take 1 capsule by mouth once daily., Disp: , Rfl:     metoprolol succinate (TOPROL-XL) 25 MG 24 hr tablet, Take 50 mg by mouth every evening., Disp: , Rfl:     omeprazole (PRILOSEC) 40 MG capsule, Take 40 mg by mouth every morning., Disp: , Rfl:     sucralfate (CARAFATE) 1 gram tablet, Take 1 g by mouth 4 (four) times daily before meals and nightly., Disp: , Rfl:     PMHx/PSHx Updates:  See patient's last visit with me on 11/28/2023  See H&P on 10/25/2018        Pathology:  Cancer Staging      Gastric Wedge Resection: 11/27/2018:    FINAL DIAGNOSIS:  POSTERIOR STOMACH, WEDGE RESECTION:   GASTROINTESTINAL STROMAL TUMOR (GIST), MIXED SPINDLE CELL AND  EPITHELIOID TYPE.    2.5 CM IN GREATEST DIMENSIONS.    THE SHAVED SURGICAL MARGIN OF RESECTION IS FREE OF TUMOR.    THE TUMOR FOCALLY EXTENDS TO THE SEROSAL SURFACE.  pT2NX, (Stage IA).  G1: Low Grade with mitotic rate less than or equal to 5/5mm2  KIT ():     Positive.   DOG-1:     Positive    RISK ASSESSMENT:   Very low risk (1.9%), Based on the strict criteria of mitotic count  and size of tumor. A moderate risk cannot be entirely   excluded.    Objective:     Vitals:  Blood pressure (!) 155/73, pulse 62, temperature 97 °F (36.1 °C), resp. rate 16, height 5' 3" (1.6 m), weight 65.1 kg (143 lb 8 oz).    Physical Examination:   GEN: no apparent distress, comfortable; AAOx3  HEAD: atraumatic and normocephalic  EYES: no pallor, no icterus, PERRLA  ENT: OMM, no pharyngeal erythema, external ears WNL; no nasal discharge; no thrush  NECK: no masses, thyroid normal, trachea midline, no LAD/LN's, supple  CV: RRR with no murmur; normal pulse; normal S1 and S2; no pedal edema  CHEST: Normal respiratory effort; CTAB; normal breath sounds; no wheeze or crackles  ABDOM: nontender and nondistended; soft; normal bowel sounds; no rebound/guarding  MUSC/Skeletal: ROM normal; no crepitus; joints " normal; no deformities or arthropathy  EXTREM: no clubbing, cyanosis, inflammation or swelling  SKIN: no rashes, lesions, ulcers, petechiae or subcutaneous nodules  : no amaro  NEURO: grossly intact; motor/sensory WNL; AAOx3; no tremors  PSYCH: normal mood, affect and behavior  LYMPH: normal cervical, supraclavicular, axillary and groin LN's            Labs:      Lab Results   Component Value Date    WBC 6.77 05/25/2024    HGB 13.6 05/25/2024    HCT 42.7 05/25/2024    MCV 99 (H) 05/25/2024     05/25/2024     CMP  Sodium   Date Value Ref Range Status   05/25/2024 139 136 - 145 mmol/L Final   01/17/2019 138 134 - 144 mmol/L      Potassium   Date Value Ref Range Status   05/25/2024 4.3 3.5 - 5.1 mmol/L Final     Chloride   Date Value Ref Range Status   05/25/2024 106 95 - 110 mmol/L Final   01/17/2019 98 98 - 110 mmol/L      CO2   Date Value Ref Range Status   05/25/2024 26 23 - 29 mmol/L Final     Glucose   Date Value Ref Range Status   05/25/2024 99 70 - 110 mg/dL Final   01/17/2019 114 (H) 70 - 99 mg/dL      BUN   Date Value Ref Range Status   05/25/2024 28 (H) 8 - 23 mg/dL Final     Creatinine   Date Value Ref Range Status   05/25/2024 1.1 0.5 - 1.4 mg/dL Final   01/17/2019 1.08 0.60 - 1.40 mg/dL      Calcium   Date Value Ref Range Status   05/25/2024 9.9 8.7 - 10.5 mg/dL Final     Total Protein   Date Value Ref Range Status   05/25/2024 7.7 6.0 - 8.4 g/dL Final     Albumin   Date Value Ref Range Status   05/25/2024 4.5 3.5 - 5.2 g/dL Final   01/17/2019 4.2 3.1 - 4.7 g/dL      Total Bilirubin   Date Value Ref Range Status   05/25/2024 0.4 0.1 - 1.0 mg/dL Final     Comment:     For infants and newborns, interpretation of results should be based  on gestational age, weight and in agreement with clinical  observations.    Premature Infant recommended reference ranges:  Up to 24 hours.............<8.0 mg/dL  Up to 48 hours............<12.0 mg/dL  3-5 days..................<15.0 mg/dL  6-29  days.................<15.0 mg/dL       Alkaline Phosphatase   Date Value Ref Range Status   05/25/2024 91 55 - 135 U/L Final     AST   Date Value Ref Range Status   05/25/2024 21 10 - 40 U/L Final     ALT   Date Value Ref Range Status   05/25/2024 18 10 - 44 U/L Final     Anion Gap   Date Value Ref Range Status   05/25/2024 7 (L) 8 - 16 mmol/L Final     eGFR if    Date Value Ref Range Status   07/27/2022 59.2 (A) >60 mL/min/1.73 m^2 Final     eGFR if non    Date Value Ref Range Status   07/27/2022 51.3 (A) >60 mL/min/1.73 m^2 Final     Comment:     Calculation used to obtain the estimated glomerular filtration  rate (eGFR) is the CKD-EPI equation.                Radiology/Diagnostic Studies:      CT 3/28/2024:    Impression:  Mild right hydronephrosis, similar compared to prior study.        CT 4/13/2023:    CT ABDOMEN:  Oral contrast remains in patulous distal esophagus, unchanged. Visualized lung bases are clear.     Noncontrast liver, pancreas, spleen, and adrenals are normal. Kidneys show no new abnormality left renal cortical hypodensities suggesting cysts unchanged.     Minor aortoiliac calcifications are present.     Surgical staples affect the gastric fundus and proximal greater curvature, with stomach otherwise unremarkable. Diverticula arise from the colon. Enteric contrast courses through small intestines without obstruction. A normal appendix is present. No enlarged lymph nodes throughout the abdomen.     No suspicious osseous abnormality.     CT PELVIS:  Noncontrast uterus and ovaries are normal. No free pelvic fluid. Bladder is normal. No enlarged pelvic lymph nodes. No osseous abnormality.     IMPRESSION:  No evidence of recurrent malignancy or metastatic disease.        CT 9/6/2022:    IMPRESSION:     1. Negative for recurrent malignancy or metastatic disease, with unchanged postoperative changes of proximal stomach.  2. Moderate right hydronephrosis, of undetermined  etiology and unchanged. Although renal sinus cysts could give a similar appearance, and the current appearance appears more indicative of hydronephrosis of undetermined etiology. Further imaging evaluation is recommended with CT urogram without and with IV contrast to assess for potential etiology and definitively differentiate renal sinus cysts from hydronephrosis.        CT abdom/pelvis  9/7/2021:      IMPRESSION:  1. Postoperative changes of prior GIST tumor resection of the cardia of the stomach, with no lymphadenopathy or CT finding of residual/recurrent disease.  2. No acute abdominal or pelvic abnormality.          US  abdom  7/28/2021:  IMPRESSION:  Dilated common duct reaching diameter of 8 mm. Further evaluation with CT abdomen and pelvis with IV contrast and MRCP are recommended.         CT abdom/pelvis  9/2/2020:    IMPRESSION:     1.  Status post resection of gastric GIST tumor. No recurrent tumor  or regional soft tissue abnormality identified.  2.  No other acute changes when compared previous exam.         PET  7/29/2019:  Impression       No FDG PET/CT findings to suggest recurrent or metastatic disease.             Chest CT 4/24/2019    IMPRESSION:  1. No acute abnormality seen.  2. The 2 lung nodules seen on CT PET scan dated 01/29/2019 represent calcified  granulomas.  3. There are several small perifissural lymph nodes in the right lung measuring  5 mm or less in diameter.  4. There  is no evidence of lung metastasis.  5. No change in the sclerotic focus in the right scapula described on the  previous PET scan and increases the probability of it representing a benign  lesion.        PET 1/15/2019:  IMPRESSION:    1. No current convincing evidence for residual malignancy or metastatic disease.    2. Incidental findings tiny noncalcified left lung nodules and sclerotic focus  in right scapula. Benign etiologies are favored, although metastatic disease is  conceivable but felt less likely. CT  "thorax without IV contrast follow-up is  recommended in 3 months to begin to document prolonged stability if imaging  follow-up suggested for at least 2 years. If old outside CTs of the chest are  available to document prolonged stability and these are made available,  addendum can be issued at that time.    3. Focus of FDG uptake along deep aspect of umbilicus without CT correlate is  likely inflammatory in nature and related to recent surgery.        I have reviewed all available lab results and radiology reports.    Assessment/Plan:   (1) 69 y.o. female with diagnosis of a submucosal nodule of the gastric cardia who has been referred by Dr Yunior Wong with GI for evaluation by medical hematology/oncology.   - She had originally presented with abdominal pain and underwent EGD with Dr Wong on 10/19/2018.   - She had a CT scan on 10/11/2018 which showed a 2.7 x 1.6cm soft tissue mass in the cardia of the stomach.   - on EGD, he found a submucosal nodule in the cardia of the stomach.   - Pathology from the initial biopsy seems to have been nondiagnostic.   - she saw Dr Garcia with Gen Surg and underwent a wedge resection on 11/27/2018 with the pathology ultimately showing GIST  - G1: Low Grade; pT2NX, (Stage IA).  - upon review of the latest guidelines, she should not need any adjuvant therapy with the drug Gleevec due to the low-grade nature of her GIST  - "According to pathologist, her tumor was 2.5cm but was low grade with mitotic rate < or = to 5/50 hpf  - her risk assessment as a result is very low risk at 1.9% risk on metastasis"   - as a result, based on the latest NCCN guidelines (v. 1.2019) I would recommend surveillance only and she should not need Gleevec therapy at this time    - she had PEt on 7/29/2019 7/21/2020:  - She last saw Dr Wong about 4 months ago and had repeat scope which was "good" per patient   - she is due for repeat labs and scnas    1/21/2021:  - some residual reflux symptoms " but she has not seen Dr Wong in over a year  - scans in Sept 2020 on chart and good  - repeat scans again in Sept 2021 7/29/2021:  - check up to date labs  - CT scheduled for Sept 7th 2021  - she needs to f/u with Dr Wong with GI for the reflux sx's  - recent US done with Dr almeida with some dilation of the bile duct    1/27/2022:  - she had CT scan in Sept 2021 which were stable with no evidence of recurrent cancer  - she has some residual reflux which is controlled with prilosec  - she has not follow-up with Dr Wong recently and I encouraged her to do so      7/27/2022:  - she is doing ok with no new issues  - I have recommended repeat CT scans in Sept 2022  - she needs up to date labs  - I recommend and encouraged her to f/u with GI for repeat endoscopies    10/11/2022:  - She had scope on 10/7/2022 with Dr Asif - looks like they just found a small ulcer 3mm at GE junction and some erythema in gastric  - She had CT scans on 9/6/2022 with no evidence of residual or recurrent cancer but she has some right sided hydronephrosis.   - She saw Dr Kennedy with   and she had a cystoscope on 9/30  -  is expecting to see her again in one month    4/11/2023:  - doing ok with no new issues  - will get repeat scans in Sept 2023 11/28/2023:  - she is doing ok with no new issues  - last scans were in April 2023  - she sees Dr Asif again in Jan 2024 5/28/2024:  - She has had some HBP issues as of lately - she has been to the hospital twice and is followed by Dr Almeida and sees him again on 6/6/2024  - She had scope again in Feb 2024 with Dr Wong  which was good per patient  - She last had CT scans in March 2024  - labs are adequate      (2) HTN and hypercholesterolemia     (3) Hernia     (4) Chronic gastritis      (5) Low potassium     (6) S/p prior bladder drainage issue          VISIT DIAGNOSES:      Gastrointestinal stromal tumor (GIST) of stomach    Mass of stomach - cardia    Abnormal findings on  "esophagogastroduodenoscopy (EGD)    Abnormal computed tomography of stomach          PLAN:  1. F/u with GI as directed   2. No indication for Gleevec at this time  3. F/u with PCP, GI, Card, Gen Surg,  etc   4. Repeat scan in April 2025  5. RTC in  6 months    Fax note to Yefri Breaux/Judith; Azeb Almeida: Brent    Discussion:     Pathology Discussion:    I reviewed and discussed the pathology report(s) and radiograph reports (if available) in as simple to understand and/or laymen's terms to the best of my ability. I had an indepth conversation with the patient and went over the patient's individual diagnosis based on the information that was currently available. I discussed the TNM staging process with regard to the patient's particular cancer type, and the calculated stage based on the currently available TNM data and literature. I discussed the available prognostic data with regard to the current staging information and how it relates to the prognosis of their particular neoplastic process.          NCCN Guidelines:    I discussed the available treatment option(s) in accordance with the latest literature from the NCCN Clinical Practice Guidelines for the patient's particular type of cancer disorder. The NCCN Guidelines provide a "document evidence-based (and) consensus-driven management" of the care of oncology patients. The treatment recommendations were made not only in accordance to the NCCN guidelines, but also factored in to account the patient's overall age, condition, performance status and their medical co-morbidities. I went over the risks and benefits of the the treatment options (if any could be made) with regard to their particular cancer type, their cancer stage, their age, and their co-morbidities.            COVID-19 Discussion:    I had long discussion with patient and any applicable family about the COVID-19 coronavirus epidemic and the recommended precautions with regard to " cancer and/or hematology patients. I have re-iterated the CDC recommendations for adequate hand washing, use of hand -like products, and coughing into elbow, etc. In addition, especially for our patients who are on chemotherapy and/or our otherwise immunocompromised patients, I have recommended avoidance of crowds, including movie theaters, restaurants, churches, etc. I have recommended avoidance of any sick or symptomatic family members and/or friends. I have also recommended avoidance of any raw and unwashed food products, and general avoidance of food items that have not been prepared by themselves. The patient has been asked to call us immediately with any symptom developments, issues, questions or other general concerns.       I spent over 25 mins of time with the patient. Reviewed results of the recently ordered labs, tests and studies; made directives with regards to the results. Over half of this time was spent couseling and coordinating care.    I have explained all of the above in detail and the patient understands all of the current recommendation(s). I have answered all of their questions to the best of my ability and to their complete satisfaction.   The patient is to continue with the current management plan.    Electronically signed by William Joe MD

## 2024-05-28 ENCOUNTER — OFFICE VISIT (OUTPATIENT)
Facility: CLINIC | Age: 69
End: 2024-05-28
Payer: MEDICARE

## 2024-05-28 VITALS
DIASTOLIC BLOOD PRESSURE: 73 MMHG | HEIGHT: 63 IN | BODY MASS INDEX: 25.43 KG/M2 | HEART RATE: 62 BPM | WEIGHT: 143.5 LBS | TEMPERATURE: 97 F | SYSTOLIC BLOOD PRESSURE: 155 MMHG | RESPIRATION RATE: 16 BRPM

## 2024-05-28 DIAGNOSIS — C49.A2 GASTROINTESTINAL STROMAL TUMOR (GIST) OF STOMACH: Primary | ICD-10-CM

## 2024-05-28 DIAGNOSIS — K31.89 MASS OF STOMACH: ICD-10-CM

## 2024-05-28 DIAGNOSIS — R19.8 ABNORMAL FINDINGS ON ESOPHAGOGASTRODUODENOSCOPY (EGD): ICD-10-CM

## 2024-05-28 DIAGNOSIS — R93.3 ABNORMAL COMPUTED TOMOGRAPHY OF STOMACH: ICD-10-CM

## 2024-05-28 PROCEDURE — 3078F DIAST BP <80 MM HG: CPT | Mod: CPTII,S$GLB,, | Performed by: INTERNAL MEDICINE

## 2024-05-28 PROCEDURE — 3288F FALL RISK ASSESSMENT DOCD: CPT | Mod: CPTII,S$GLB,, | Performed by: INTERNAL MEDICINE

## 2024-05-28 PROCEDURE — 3077F SYST BP >= 140 MM HG: CPT | Mod: CPTII,S$GLB,, | Performed by: INTERNAL MEDICINE

## 2024-05-28 PROCEDURE — 1126F AMNT PAIN NOTED NONE PRSNT: CPT | Mod: CPTII,S$GLB,, | Performed by: INTERNAL MEDICINE

## 2024-05-28 PROCEDURE — 1159F MED LIST DOCD IN RCRD: CPT | Mod: CPTII,S$GLB,, | Performed by: INTERNAL MEDICINE

## 2024-05-28 PROCEDURE — 4010F ACE/ARB THERAPY RXD/TAKEN: CPT | Mod: CPTII,S$GLB,, | Performed by: INTERNAL MEDICINE

## 2024-05-28 PROCEDURE — 99999 PR PBB SHADOW E&M-EST. PATIENT-LVL IV: CPT | Mod: PBBFAC,,, | Performed by: INTERNAL MEDICINE

## 2024-05-28 PROCEDURE — 99214 OFFICE O/P EST MOD 30 MIN: CPT | Mod: S$GLB,,, | Performed by: INTERNAL MEDICINE

## 2024-05-28 PROCEDURE — G2211 COMPLEX E/M VISIT ADD ON: HCPCS | Mod: S$GLB,,, | Performed by: INTERNAL MEDICINE

## 2024-05-28 PROCEDURE — 1101F PT FALLS ASSESS-DOCD LE1/YR: CPT | Mod: CPTII,S$GLB,, | Performed by: INTERNAL MEDICINE

## 2024-05-28 PROCEDURE — 3008F BODY MASS INDEX DOCD: CPT | Mod: CPTII,S$GLB,, | Performed by: INTERNAL MEDICINE

## 2024-05-28 PROCEDURE — 1160F RVW MEDS BY RX/DR IN RCRD: CPT | Mod: CPTII,S$GLB,, | Performed by: INTERNAL MEDICINE

## 2024-06-06 ENCOUNTER — OFFICE VISIT (OUTPATIENT)
Dept: UROLOGY | Facility: CLINIC | Age: 69
End: 2024-06-06
Payer: MEDICARE

## 2024-06-06 VITALS — WEIGHT: 143.5 LBS | HEIGHT: 63 IN | BODY MASS INDEX: 25.43 KG/M2

## 2024-06-06 DIAGNOSIS — N28.9 DISORDER OF KIDNEY AND URETER, UNSPECIFIED: ICD-10-CM

## 2024-06-06 DIAGNOSIS — N13.30 HYDRONEPHROSIS OF RIGHT KIDNEY: Primary | ICD-10-CM

## 2024-06-06 PROCEDURE — 1126F AMNT PAIN NOTED NONE PRSNT: CPT | Mod: CPTII,S$GLB,, | Performed by: STUDENT IN AN ORGANIZED HEALTH CARE EDUCATION/TRAINING PROGRAM

## 2024-06-06 PROCEDURE — 1159F MED LIST DOCD IN RCRD: CPT | Mod: CPTII,S$GLB,, | Performed by: STUDENT IN AN ORGANIZED HEALTH CARE EDUCATION/TRAINING PROGRAM

## 2024-06-06 PROCEDURE — 99215 OFFICE O/P EST HI 40 MIN: CPT | Mod: S$GLB,,, | Performed by: STUDENT IN AN ORGANIZED HEALTH CARE EDUCATION/TRAINING PROGRAM

## 2024-06-06 PROCEDURE — 87086 URINE CULTURE/COLONY COUNT: CPT | Performed by: STUDENT IN AN ORGANIZED HEALTH CARE EDUCATION/TRAINING PROGRAM

## 2024-06-06 PROCEDURE — 1101F PT FALLS ASSESS-DOCD LE1/YR: CPT | Mod: CPTII,S$GLB,, | Performed by: STUDENT IN AN ORGANIZED HEALTH CARE EDUCATION/TRAINING PROGRAM

## 2024-06-06 PROCEDURE — G2211 COMPLEX E/M VISIT ADD ON: HCPCS | Mod: S$GLB,,, | Performed by: STUDENT IN AN ORGANIZED HEALTH CARE EDUCATION/TRAINING PROGRAM

## 2024-06-06 PROCEDURE — 4010F ACE/ARB THERAPY RXD/TAKEN: CPT | Mod: CPTII,S$GLB,, | Performed by: STUDENT IN AN ORGANIZED HEALTH CARE EDUCATION/TRAINING PROGRAM

## 2024-06-06 PROCEDURE — 99999 PR PBB SHADOW E&M-EST. PATIENT-LVL III: CPT | Mod: PBBFAC,,, | Performed by: STUDENT IN AN ORGANIZED HEALTH CARE EDUCATION/TRAINING PROGRAM

## 2024-06-06 PROCEDURE — 3008F BODY MASS INDEX DOCD: CPT | Mod: CPTII,S$GLB,, | Performed by: STUDENT IN AN ORGANIZED HEALTH CARE EDUCATION/TRAINING PROGRAM

## 2024-06-06 PROCEDURE — 3288F FALL RISK ASSESSMENT DOCD: CPT | Mod: CPTII,S$GLB,, | Performed by: STUDENT IN AN ORGANIZED HEALTH CARE EDUCATION/TRAINING PROGRAM

## 2024-06-06 RX ORDER — CEFAZOLIN SODIUM 2 G/50ML
2 SOLUTION INTRAVENOUS
OUTPATIENT
Start: 2024-06-06

## 2024-06-06 NOTE — PROGRESS NOTES
[facility] Note    PCP: Will Velarde MD    Chief Complaint: right hydro    SUBJECTIVE:       History of Present Illness:  Lorraine Warner is a 69 y.o. female who presents to clinic for right hydro. She is Established  to our clinic.     MAG 3 12/13/22: The left kidney accounts for 48% of the right kidney 52% of global uptake. No obstruction bilaterally.     CT 3/28/24: stable mild right hydro    GFR 5/25/24: 54 (stable)    Patient states that she has continue vague abdominal and back pain that is impacting her life. Has been to the ED a few times.   Pain is not exacerbated by fluid intake     She had what sounds like a stent placed in her kidney years ago in [place] and says this did help with her pain.        9/15/22  Recently underwent a CT scan which showed mild/moderate right hydro to the UPJ. In 7/2021 she had a CT which is read as parapelvic cysts with no hydro, this is similar to previous reads however on her PET scan it appears to be more consistent with hydro.     No right sided flank pain.   No issues with urination.   No hematuria. No dysuria.     UA today: +leuks and blood - asymptomatic     Last urine culture: no documented UTIs    Lab Results   Component Value Date    CREATININE 1.1 05/25/2024     Family  hx: no bladder or kidney cancer  Hx of HLD, HTN, CAD, GIST tumor    Past medical, family, and social history reviewed as documented in chart with pertinent positive medical, family, and social history detailed in HPI.    Review of patient's allergies indicates:   Allergen Reactions    Alendronate sodium      Other Reaction(s): chest pain       Past Medical History:   Diagnosis Date    Abnormal computed tomography of stomach 10/24/2018    Abnormal CT of the abdomen (soft tissue mass cardia of stomach) 10/24/2018    Chest pressure     @ rest within last week- no NTG taken - sees Dr. Almeida - seen last within month    Coronary artery disease     Gastrointestinal stromal  tumor (GIST) of stomach 01/15/2019    GERD (gastroesophageal reflux disease)     Hyperlipidemia     Hypertension     Mass of stomach - cardia 10/24/2018     Past Surgical History:   Procedure Laterality Date    ANGIOGRAM, CORONARY, WITH LEFT HEART CATHETERIZATION N/A 8/27/2019    Procedure: ANGIOGRAM,CORONARY,WITH LEFT HEART CATHETERIZATION;  Surgeon: Chandana Almeida MD;  Location: [facility] CATH/EP LAB;  Service: Cardiology;  Laterality: N/A;    BLADDER SURGERY      COLONOSCOPY N/A 2/29/2024    Procedure: COLONOSCOPY;  Surgeon: Yunior Wong MD;  Location: [facility] ENDO;  Service: Endoscopy;  Laterality: N/A;    CORONARY STENT PLACEMENT  2018    CYSTOSCOPY W/ RETROGRADES Right 9/30/2022    Procedure: CYSTOSCOPY, WITH RETROGRADE PYELOGRAM;  Surgeon: Giovana Kennedy MD;  Location: [facility] OR;  Service: Urology;  Laterality: Right;    ENDOSCOPIC ULTRASOUND OF UPPER GASTROINTESTINAL TRACT  08/10/2021    ENDOSCOPIC ULTRASOUND OF UPPER GASTROINTESTINAL TRACT N/A 8/10/2021    Procedure: ULTRASOUND, UPPER GI TRACT, ENDOSCOPIC;  Surgeon: Kash Lyons III, MD;  Location: [facility];  Service: Endoscopy;  Laterality: N/A;    ESOPHAGOGASTRODUODENOSCOPY N/A 2/29/2024    Procedure: EGD (ESOPHAGOGASTRODUODENOSCOPY);  Surgeon: Yunior Wong MD;  Location: [facility];  Service: Endoscopy;  Laterality: N/A;    LAPAROSCOPIC PARTIAL GASTRECTOMY  11/27/2018    Dr. Garcia     UPPER GASTROINTESTINAL ENDOSCOPY  08/10/2021     Family History   Problem Relation Name Age of Onset    No Known Problems Mother      No Known Problems Father       Social History     Tobacco Use    Smoking status: Never    Smokeless tobacco: Never   Substance Use Topics    Alcohol use: No    Drug use: No        Review of Systems   Genitourinary:  Negative for difficulty urinating, dysuria, flank pain, frequency, hematuria, nocturia and urgency.       OBJECTIVE:     Anticoagulation:  aspirin 81 mg     Estimated body mass index is 25.42 kg/m² as calculated  "from the following:    Height as of this encounter: 5' 3" (1.6 m).    Weight as of this encounter: 65.1 kg (143 lb 8.3 oz).    Vital Signs (Most Recent)       Physical Exam  Vitals reviewed.   Constitutional:       General: She is not in acute distress.     Appearance: Normal appearance. She is not ill-appearing.   HENT:      Head: Normocephalic and atraumatic.   Eyes:      General: No scleral icterus.  Cardiovascular:      Rate and Rhythm: Normal rate and regular rhythm.   Pulmonary:      Effort: Pulmonary effort is normal. No respiratory distress.   Abdominal:      Palpations: Abdomen is soft.   Skin:     Coloration: Skin is not jaundiced.   Neurological:      General: No focal deficit present.      Mental Status: She is alert and oriented to person, place, and time.   Psychiatric:         Mood and Affect: Mood normal.         Behavior: Behavior normal.         BMP  Lab Results   Component Value Date     05/25/2024    K 4.3 05/25/2024     05/25/2024    CO2 26 05/25/2024    BUN 28 (H) 05/25/2024    CREATININE 1.1 05/25/2024    CALCIUM 9.9 05/25/2024    ANIONGAP 7 (L) 05/25/2024    ESTGFRAFRICA 59.2 (A) 07/27/2022    EGFRNONAA 51.3 (A) 07/27/2022       Lab Results   Component Value Date    WBC 6.77 05/25/2024    HGB 13.6 05/25/2024    HCT 42.7 05/25/2024    MCV 99 (H) 05/25/2024     05/25/2024     Imaging:  Per HPI    ASSESSMENT     1. Hydronephrosis of right kidney    2. Disorder of kidney and ureter, unspecified      PLAN:     - Based on prior renal scan no obstruction is seen and kidneys function equally  - Latest CT is stable and kidneys appear healthy   - I do not believe the pain she is describing is related to her mild right hydro. There are many other more plausible causes such as musculoskeletal or GI   - Discussed that if we wanted to take this off the table we could place stent and see if this helps with her pain and her mild CKD  - If so, then more definitive options can be discussed " or if not then stent can be removed   - She wants to proceed with stent placement. Scheduled 6/28/24.  - Urine sent today for culture.     Giovana Kennedy MD       I spent 40 minutes with the patient of which more than half was spent in direct consultation with the patient in regards to our treatment and plan.      Visit today included increased complexity associated with the care of the episodic problem right hydro addressed and managing the longitudinal care of the patient due to the serious and/or complex managed problem(s).

## 2024-06-06 NOTE — PROGRESS NOTES
Procedure Order to Urology [[id number]]    Electronically signed by: Giovana Kennedy MD on 06/06/24 1325 Status: Active   Ordering user: Giovana Kennedy MD 06/06/24 1325 Authorized by: Giovana Kennedy MD   Ordering mode: Standard   Frequency:  06/06/24 -   Released by: Giovana Kennedy MD 06/06/24 1325   Diagnoses  Hydronephrosis of right kidney [N13.30]   Questionnaire    Question Answer   Procedure Cystoscopy with Stent Placement Comment - right, 6/28   Facility Name: [facility] ,Please order BMP,CBC , UA, Urine culture, EKG if above 40 years old , Ancef 2 Gram( cipro 400 mg IV for PCN allergy)   ,Iv start, NPO, General anesthesia. Cpt-[id number]

## 2024-06-07 LAB — BACTERIA UR CULT: NO GROWTH

## 2024-06-16 LAB
OHS QRS DURATION: 86 MS
OHS QTC CALCULATION: 454 MS

## 2024-06-21 ENCOUNTER — TELEPHONE (OUTPATIENT)
Dept: UROLOGY | Facility: CLINIC | Age: 69
End: 2024-06-21
Payer: MEDICARE

## 2024-06-21 NOTE — TELEPHONE ENCOUNTER
----- Message from Amelie Navas sent at 6/21/2024  9:35 AM CDT -----  Regarding: cancel procedure  Contact: pt  Type: Needs Medical Advice  Who Called:  patient  Symptoms (please be specific):  cancel procedure  How long has patient had these symptoms:    Pharmacy name and phone #:    Best Call Back Number: [phone]    Additional Information: thanks

## 2024-06-21 NOTE — TELEPHONE ENCOUNTER
Returned call and spoke with gentleman to verify the cancelling of procedure on 6/28/24. He stated yes with no reason and no need to reschedule , message forwarded to provider.

## 2024-11-25 PROBLEM — Z85.09 HISTORY OF GASTROINTESTINAL STROMAL TUMOR (GIST): Status: ACTIVE | Noted: 2019-01-15

## (undated) DEVICE — SHEATH PINNACLE 6FRX10CM W/GUIDEWIRE

## (undated) DEVICE — LEGGING CLEAR POLY 2/PACK

## (undated) DEVICE — CATHETER EXPO MLTPK 6FR 100X110CM

## (undated) DEVICE — BOWL STERILE LARGE 32OZ

## (undated) DEVICE — SOL IRR NACL .9% 3000ML

## (undated) DEVICE — COVER TABLE 44X90 STERILE

## (undated) DEVICE — SOL 9P NACL IRR PIC IL

## (undated) DEVICE — SYR LUER LOCK 20ML

## (undated) DEVICE — DRAPE T CYSTOSCOPY STERILE

## (undated) DEVICE — GUIDE WIRE MOTION .035 X 150CM

## (undated) DEVICE — SET IRR URLGY 2LINE UNIV SPIKE

## (undated) DEVICE — BAG LINGEMAN DRAIN UROLOGY

## (undated) DEVICE — CATH POLLACK OPEN-END FLEXI-TI

## (undated) DEVICE — CONTAINER SPECIMEN OR STER 4OZ

## (undated) DEVICE — JELLY SURGILUBE LUBE TUBE 2OZ

## (undated) DEVICE — GUIDEWIRE 190CMX0.014IN WHISPER 1005357HJ

## (undated) DEVICE — SYR ONLY LUER LOCK 20CC

## (undated) DEVICE — CATHETER GUIDE LAUNCHER EBU30 6X110

## (undated) DEVICE — CATHETER DIAGNOSTIC DXTERITY 6FR JL 3.5

## (undated) DEVICE — SCRUB DYNA-HEX LIQ 4% CHG 4OZ

## (undated) DEVICE — GOWN POLY REINF BRTH SLV XL

## (undated) DEVICE — GOWN POLY REINF BRTH SLV LG

## (undated) DEVICE — SLEEVE SCD EXPRESS KNEE MEDIUM

## (undated) DEVICE — SPONGE BULKEE II ABSRB 6X6.75

## (undated) DEVICE — GLOVE SURGEONS ULTRA TOUCH 6.5